# Patient Record
Sex: FEMALE | Race: WHITE | Employment: OTHER | URBAN - METROPOLITAN AREA
[De-identification: names, ages, dates, MRNs, and addresses within clinical notes are randomized per-mention and may not be internally consistent; named-entity substitution may affect disease eponyms.]

---

## 2017-04-06 ENCOUNTER — HOSPITAL ENCOUNTER (OUTPATIENT)
Dept: MAMMOGRAPHY | Facility: HOSPITAL | Age: 67
Discharge: HOME | End: 2017-04-06
Attending: FAMILY MEDICINE

## 2017-04-06 DIAGNOSIS — Z12.31 VISIT FOR SCREENING MAMMOGRAM: ICD-10-CM

## 2017-06-23 ENCOUNTER — RECORDS - HEALTHEAST (OUTPATIENT)
Dept: LAB | Facility: CLINIC | Age: 67
End: 2017-06-23

## 2017-06-23 LAB
CHOLEST SERPL-MCNC: 214 MG/DL
FASTING STATUS PATIENT QL REPORTED: NO
HDLC SERPL-MCNC: 54 MG/DL
LDLC SERPL CALC-MCNC: 123 MG/DL
TRIGL SERPL-MCNC: 185 MG/DL

## 2018-02-08 ENCOUNTER — RECORDS - HEALTHEAST (OUTPATIENT)
Dept: ADMINISTRATIVE | Facility: OTHER | Age: 68
End: 2018-02-08

## 2018-02-13 ENCOUNTER — COMMUNICATION - HEALTHEAST (OUTPATIENT)
Dept: NEUROSURGERY | Facility: CLINIC | Age: 68
End: 2018-02-13

## 2018-02-14 ENCOUNTER — AMBULATORY - HEALTHEAST (OUTPATIENT)
Dept: LAB | Facility: HOSPITAL | Age: 68
End: 2018-02-14

## 2018-02-14 ENCOUNTER — COMMUNICATION - HEALTHEAST (OUTPATIENT)
Dept: TELEHEALTH | Facility: CLINIC | Age: 68
End: 2018-02-14

## 2018-02-14 DIAGNOSIS — K11.7 XEROSTOMIA DUE TO AUTOIMMUNE DISEASE (H): ICD-10-CM

## 2018-02-14 DIAGNOSIS — M35.9 XEROSTOMIA DUE TO AUTOIMMUNE DISEASE (H): ICD-10-CM

## 2018-02-15 LAB — ANA SER QL: 0.4 U

## 2018-02-20 ENCOUNTER — RECORDS - HEALTHEAST (OUTPATIENT)
Dept: ADMINISTRATIVE | Facility: OTHER | Age: 68
End: 2018-02-20

## 2018-02-20 LAB
SS-A/RO AUTOANTIBODIES - HISTORICAL: 10 EU
SS-B/LA AUTOANTIBODIES - HISTORICAL: 0 EU

## 2018-02-22 ENCOUNTER — OFFICE VISIT - HEALTHEAST (OUTPATIENT)
Dept: NEUROSURGERY | Facility: CLINIC | Age: 68
End: 2018-02-22

## 2018-02-22 DIAGNOSIS — M47.812 FACET ARTHROPATHY, CERVICAL: ICD-10-CM

## 2018-02-22 DIAGNOSIS — M54.2 NECK PAIN: ICD-10-CM

## 2018-02-22 RX ORDER — ASPIRIN 325 MG
325 TABLET, DELAYED RELEASE (ENTERIC COATED) ORAL 2 TIMES DAILY
Status: SHIPPED | COMMUNITY
Start: 2018-02-22 | End: 2024-01-09

## 2018-02-22 ASSESSMENT — MIFFLIN-ST. JEOR: SCORE: 1326.89

## 2018-02-26 ENCOUNTER — AMBULATORY - HEALTHEAST (OUTPATIENT)
Dept: LAB | Facility: HOSPITAL | Age: 68
End: 2018-02-26

## 2018-02-26 ENCOUNTER — COMMUNICATION - HEALTHEAST (OUTPATIENT)
Dept: PHYSICAL MEDICINE AND REHAB | Facility: CLINIC | Age: 68
End: 2018-02-26

## 2018-02-26 ENCOUNTER — HOSPITAL ENCOUNTER (OUTPATIENT)
Dept: PHYSICAL MEDICINE AND REHAB | Facility: CLINIC | Age: 68
Discharge: HOME | End: 2018-02-26
Attending: PHYSICIAN ASSISTANT

## 2018-02-26 ENCOUNTER — HOSPITAL ENCOUNTER (OUTPATIENT)
Dept: PHYSICAL MEDICINE AND REHAB | Facility: CLINIC | Age: 68
Discharge: HOME | End: 2018-02-26
Attending: NEUROLOGICAL SURGERY

## 2018-02-26 DIAGNOSIS — M54.81 OCCIPITAL NEURALGIA OF LEFT SIDE: ICD-10-CM

## 2018-02-26 DIAGNOSIS — M54.2 NECK PAIN: ICD-10-CM

## 2018-02-26 DIAGNOSIS — R59.9 LYMPH NODE ENLARGEMENT: ICD-10-CM

## 2018-02-26 DIAGNOSIS — M47.812 FACET ARTHROPATHY, CERVICAL: ICD-10-CM

## 2018-02-26 LAB
BASOPHILS # BLD AUTO: 0 THOU/UL (ref 0–0.2)
BASOPHILS NFR BLD AUTO: 0 % (ref 0–2)
EOSINOPHIL # BLD AUTO: 0.1 THOU/UL (ref 0–0.4)
EOSINOPHIL NFR BLD AUTO: 1 % (ref 0–6)
ERYTHROCYTE [DISTWIDTH] IN BLOOD BY AUTOMATED COUNT: 12.4 % (ref 11–14.5)
HCT VFR BLD AUTO: 42.3 % (ref 35–47)
HGB BLD-MCNC: 14.3 G/DL (ref 12–16)
LYMPHOCYTES # BLD AUTO: 1.8 THOU/UL (ref 0.8–4.4)
LYMPHOCYTES NFR BLD AUTO: 26 % (ref 20–40)
MCH RBC QN AUTO: 29.8 PG (ref 27–34)
MCHC RBC AUTO-ENTMCNC: 33.8 G/DL (ref 32–36)
MCV RBC AUTO: 88 FL (ref 80–100)
MONOCYTES # BLD AUTO: 0.7 THOU/UL (ref 0–0.9)
MONOCYTES NFR BLD AUTO: 10 % (ref 2–10)
NEUTROPHILS # BLD AUTO: 4.2 THOU/UL (ref 2–7.7)
NEUTROPHILS NFR BLD AUTO: 62 % (ref 50–70)
PLATELET # BLD AUTO: 271 THOU/UL (ref 140–440)
PMV BLD AUTO: 9.9 FL (ref 8.5–12.5)
RBC # BLD AUTO: 4.8 MILL/UL (ref 3.8–5.4)
WBC: 6.8 THOU/UL (ref 4–11)

## 2018-02-28 ENCOUNTER — AMBULATORY - HEALTHEAST (OUTPATIENT)
Dept: PHYSICAL MEDICINE AND REHAB | Facility: CLINIC | Age: 68
End: 2018-02-28

## 2018-02-28 DIAGNOSIS — M54.81 OCCIPITAL NEURALGIA OF LEFT SIDE: ICD-10-CM

## 2018-02-28 DIAGNOSIS — M54.2 NECK PAIN: ICD-10-CM

## 2018-03-01 ENCOUNTER — HOSPITAL ENCOUNTER (OUTPATIENT)
Dept: PHYSICAL MEDICINE AND REHAB | Facility: CLINIC | Age: 68
Discharge: HOME | End: 2018-03-01
Attending: PHYSICIAN ASSISTANT

## 2018-03-01 DIAGNOSIS — M54.81 OCCIPITAL NEURALGIA OF LEFT SIDE: ICD-10-CM

## 2018-03-01 DIAGNOSIS — M47.812 FACET ARTHROPATHY, CERVICAL: ICD-10-CM

## 2018-03-01 DIAGNOSIS — M54.2 NECK PAIN: ICD-10-CM

## 2018-03-15 ENCOUNTER — HOSPITAL ENCOUNTER (OUTPATIENT)
Dept: PHYSICAL MEDICINE AND REHAB | Facility: CLINIC | Age: 68
Discharge: HOME | End: 2018-03-15
Attending: PHYSICIAN ASSISTANT

## 2018-03-15 DIAGNOSIS — M47.812 FACET ARTHROPATHY, CERVICAL: ICD-10-CM

## 2018-03-15 DIAGNOSIS — M54.81 OCCIPITAL NEURALGIA OF LEFT SIDE: ICD-10-CM

## 2018-03-15 DIAGNOSIS — M54.2 NECK PAIN: ICD-10-CM

## 2018-03-21 ENCOUNTER — HOSPITAL ENCOUNTER (OUTPATIENT)
Dept: PHYSICAL MEDICINE AND REHAB | Facility: CLINIC | Age: 68
Discharge: HOME | End: 2018-03-21
Attending: PHYSICIAN ASSISTANT

## 2018-03-21 DIAGNOSIS — M47.812 FACET ARTHROPATHY, CERVICAL: ICD-10-CM

## 2018-03-21 DIAGNOSIS — M12.88 OTHER SPECIFIC ARTHROPATHIES, NOT ELSEWHERE CLASSIFIED, OTHER SPECIFIED SITE: ICD-10-CM

## 2018-03-23 ENCOUNTER — COMMUNICATION - HEALTHEAST (OUTPATIENT)
Dept: PHYSICAL MEDICINE AND REHAB | Facility: CLINIC | Age: 68
End: 2018-03-23

## 2018-03-26 ENCOUNTER — HOSPITAL ENCOUNTER (OUTPATIENT)
Dept: PHYSICAL MEDICINE AND REHAB | Facility: CLINIC | Age: 68
Discharge: HOME | End: 2018-03-26
Attending: PHYSICIAN ASSISTANT

## 2018-03-26 DIAGNOSIS — M47.812 FACET ARTHROPATHY, CERVICAL: ICD-10-CM

## 2018-03-26 DIAGNOSIS — M54.12 CERVICAL RADICULITIS: ICD-10-CM

## 2018-03-26 DIAGNOSIS — M48.02 FORAMINAL STENOSIS OF CERVICAL REGION: ICD-10-CM

## 2018-03-27 ENCOUNTER — COMMUNICATION - HEALTHEAST (OUTPATIENT)
Dept: PHYSICAL MEDICINE AND REHAB | Facility: CLINIC | Age: 68
End: 2018-03-27

## 2018-03-29 ENCOUNTER — HOSPITAL ENCOUNTER (OUTPATIENT)
Dept: PHYSICAL MEDICINE AND REHAB | Facility: CLINIC | Age: 68
Discharge: HOME | End: 2018-03-29
Attending: PHYSICIAN ASSISTANT

## 2018-03-29 DIAGNOSIS — M99.81 OTHER BIOMECHANICAL LESIONS OF CERVICAL REGION: ICD-10-CM

## 2018-03-29 DIAGNOSIS — M48.02 FORAMINAL STENOSIS OF CERVICAL REGION: ICD-10-CM

## 2018-04-09 ENCOUNTER — HOSPITAL ENCOUNTER (OUTPATIENT)
Dept: MAMMOGRAPHY | Facility: CLINIC | Age: 68
Discharge: HOME | End: 2018-04-09
Attending: FAMILY MEDICINE

## 2018-04-09 DIAGNOSIS — Z12.31 VISIT FOR SCREENING MAMMOGRAM: ICD-10-CM

## 2018-04-12 ENCOUNTER — HOSPITAL ENCOUNTER (OUTPATIENT)
Dept: PHYSICAL MEDICINE AND REHAB | Facility: CLINIC | Age: 68
Discharge: HOME | End: 2018-04-12
Attending: PHYSICIAN ASSISTANT

## 2018-04-12 DIAGNOSIS — F41.9 ANXIETY: ICD-10-CM

## 2018-04-12 DIAGNOSIS — M48.02 FORAMINAL STENOSIS OF CERVICAL REGION: ICD-10-CM

## 2018-04-12 DIAGNOSIS — M47.812 FACET ARTHROPATHY, CERVICAL: ICD-10-CM

## 2018-04-12 RX ORDER — SODIUM FLUORIDE 6.1 MG/ML
GEL, DENTIFRICE DENTAL
Refills: 6 | Status: SHIPPED | COMMUNITY
Start: 2018-03-27

## 2018-04-19 ENCOUNTER — HOSPITAL ENCOUNTER (OUTPATIENT)
Dept: PHYSICAL MEDICINE AND REHAB | Facility: CLINIC | Age: 68
Discharge: HOME | End: 2018-04-19
Attending: PHYSICIAN ASSISTANT

## 2018-04-19 DIAGNOSIS — M47.812 FACET ARTHROPATHY, CERVICAL: ICD-10-CM

## 2018-04-19 DIAGNOSIS — M48.02 FORAMINAL STENOSIS OF CERVICAL REGION: ICD-10-CM

## 2018-04-19 DIAGNOSIS — M99.81 OTHER BIOMECHANICAL LESIONS OF CERVICAL REGION: ICD-10-CM

## 2018-04-19 DIAGNOSIS — M54.81 OCCIPITAL NEURALGIA OF LEFT SIDE: ICD-10-CM

## 2018-04-20 ENCOUNTER — COMMUNICATION - HEALTHEAST (OUTPATIENT)
Dept: PHYSICAL MEDICINE AND REHAB | Facility: CLINIC | Age: 68
End: 2018-04-20

## 2018-04-24 ENCOUNTER — HOSPITAL ENCOUNTER (OUTPATIENT)
Dept: PHYSICAL MEDICINE AND REHAB | Facility: CLINIC | Age: 68
Discharge: HOME | End: 2018-04-24
Attending: PHYSICAL MEDICINE & REHABILITATION

## 2018-04-24 DIAGNOSIS — M99.03 LUMBAR REGION SOMATIC DYSFUNCTION: ICD-10-CM

## 2018-04-24 DIAGNOSIS — M99.05 SOMATIC DYSFUNCTION OF PELVIS REGION: ICD-10-CM

## 2018-04-24 DIAGNOSIS — M99.04 SACRAL REGION SOMATIC DYSFUNCTION: ICD-10-CM

## 2018-04-24 DIAGNOSIS — M54.2 NECK PAIN: ICD-10-CM

## 2018-04-24 DIAGNOSIS — M99.01 CERVICAL SOMATIC DYSFUNCTION: ICD-10-CM

## 2018-04-24 DIAGNOSIS — M99.02 THORACIC REGION SOMATIC DYSFUNCTION: ICD-10-CM

## 2018-04-24 DIAGNOSIS — M99.08 RIB CAGE REGION SOMATIC DYSFUNCTION: ICD-10-CM

## 2018-04-24 DIAGNOSIS — M99.00 HEAD REGION SOMATIC DYSFUNCTION: ICD-10-CM

## 2018-05-02 ENCOUNTER — HOSPITAL ENCOUNTER (OUTPATIENT)
Dept: PHYSICAL MEDICINE AND REHAB | Facility: CLINIC | Age: 68
Discharge: HOME | End: 2018-05-02
Attending: PHYSICAL MEDICINE & REHABILITATION

## 2018-05-02 DIAGNOSIS — M47.812 CERVICAL FACET SYNDROME: ICD-10-CM

## 2018-05-02 DIAGNOSIS — M99.02 THORACIC REGION SOMATIC DYSFUNCTION: ICD-10-CM

## 2018-05-02 DIAGNOSIS — M99.04 SACRAL REGION SOMATIC DYSFUNCTION: ICD-10-CM

## 2018-05-02 DIAGNOSIS — M99.03 LUMBAR REGION SOMATIC DYSFUNCTION: ICD-10-CM

## 2018-05-02 DIAGNOSIS — M99.08 RIB CAGE REGION SOMATIC DYSFUNCTION: ICD-10-CM

## 2018-05-02 DIAGNOSIS — M99.01 CERVICAL SOMATIC DYSFUNCTION: ICD-10-CM

## 2018-05-02 DIAGNOSIS — M54.2 NECK PAIN: ICD-10-CM

## 2018-05-02 DIAGNOSIS — M99.05 SOMATIC DYSFUNCTION OF PELVIS REGION: ICD-10-CM

## 2018-05-02 DIAGNOSIS — M99.00 HEAD REGION SOMATIC DYSFUNCTION: ICD-10-CM

## 2018-05-09 ENCOUNTER — HOSPITAL ENCOUNTER (OUTPATIENT)
Dept: PHYSICAL MEDICINE AND REHAB | Facility: CLINIC | Age: 68
Discharge: HOME | End: 2018-05-09
Attending: PHYSICAL MEDICINE & REHABILITATION

## 2018-05-09 DIAGNOSIS — M54.2 NECK PAIN: ICD-10-CM

## 2018-05-09 DIAGNOSIS — M99.04 SACRAL REGION SOMATIC DYSFUNCTION: ICD-10-CM

## 2018-05-09 DIAGNOSIS — M99.00 HEAD REGION SOMATIC DYSFUNCTION: ICD-10-CM

## 2018-05-09 DIAGNOSIS — M99.05 SOMATIC DYSFUNCTION OF PELVIS REGION: ICD-10-CM

## 2018-05-09 DIAGNOSIS — M47.812 CERVICAL FACET SYNDROME: ICD-10-CM

## 2018-05-09 DIAGNOSIS — M99.02 THORACIC REGION SOMATIC DYSFUNCTION: ICD-10-CM

## 2018-05-09 DIAGNOSIS — M99.03 LUMBAR REGION SOMATIC DYSFUNCTION: ICD-10-CM

## 2018-05-09 DIAGNOSIS — M99.01 CERVICAL SOMATIC DYSFUNCTION: ICD-10-CM

## 2018-05-09 DIAGNOSIS — M99.08 RIB CAGE REGION SOMATIC DYSFUNCTION: ICD-10-CM

## 2018-05-11 ENCOUNTER — OFFICE VISIT - HEALTHEAST (OUTPATIENT)
Dept: PHYSICAL THERAPY | Facility: REHABILITATION | Age: 68
End: 2018-05-11

## 2018-05-11 DIAGNOSIS — M47.812 CERVICAL FACET SYNDROME: ICD-10-CM

## 2018-05-11 DIAGNOSIS — M54.2 NECK PAIN: ICD-10-CM

## 2018-05-11 DIAGNOSIS — M54.2 CERVICALGIA: ICD-10-CM

## 2018-05-18 ENCOUNTER — OFFICE VISIT - HEALTHEAST (OUTPATIENT)
Dept: PHYSICAL THERAPY | Facility: REHABILITATION | Age: 68
End: 2018-05-18

## 2018-05-18 DIAGNOSIS — M54.2 CERVICALGIA: ICD-10-CM

## 2018-05-18 DIAGNOSIS — M54.2 NECK PAIN: ICD-10-CM

## 2018-05-18 DIAGNOSIS — M47.812 CERVICAL FACET SYNDROME: ICD-10-CM

## 2018-05-23 ENCOUNTER — HOSPITAL ENCOUNTER (OUTPATIENT)
Dept: PHYSICAL MEDICINE AND REHAB | Facility: CLINIC | Age: 68
Discharge: HOME | End: 2018-05-23
Attending: PHYSICAL MEDICINE & REHABILITATION

## 2018-05-23 DIAGNOSIS — M54.2 NECK PAIN: ICD-10-CM

## 2018-05-23 DIAGNOSIS — M99.08 RIB CAGE REGION SOMATIC DYSFUNCTION: ICD-10-CM

## 2018-05-23 DIAGNOSIS — M99.04 SACRAL REGION SOMATIC DYSFUNCTION: ICD-10-CM

## 2018-05-23 DIAGNOSIS — M99.03 LUMBAR REGION SOMATIC DYSFUNCTION: ICD-10-CM

## 2018-05-23 DIAGNOSIS — M99.00 HEAD REGION SOMATIC DYSFUNCTION: ICD-10-CM

## 2018-05-23 DIAGNOSIS — M99.01 CERVICAL SOMATIC DYSFUNCTION: ICD-10-CM

## 2018-05-23 DIAGNOSIS — M99.05 SOMATIC DYSFUNCTION OF PELVIS REGION: ICD-10-CM

## 2018-05-23 DIAGNOSIS — M99.02 THORACIC REGION SOMATIC DYSFUNCTION: ICD-10-CM

## 2018-06-08 ENCOUNTER — COMMUNICATION - HEALTHEAST (OUTPATIENT)
Dept: PHYSICAL THERAPY | Facility: REHABILITATION | Age: 68
End: 2018-06-08

## 2018-06-08 ENCOUNTER — HOSPITAL ENCOUNTER (OUTPATIENT)
Dept: PHYSICAL MEDICINE AND REHAB | Facility: CLINIC | Age: 68
Discharge: HOME | End: 2018-06-08
Attending: PHYSICAL MEDICINE & REHABILITATION

## 2018-06-08 DIAGNOSIS — M99.03 LUMBAR REGION SOMATIC DYSFUNCTION: ICD-10-CM

## 2018-06-08 DIAGNOSIS — M99.01 CERVICAL SOMATIC DYSFUNCTION: ICD-10-CM

## 2018-06-08 DIAGNOSIS — M99.04 SACRAL REGION SOMATIC DYSFUNCTION: ICD-10-CM

## 2018-06-08 DIAGNOSIS — M54.2 NECK PAIN: ICD-10-CM

## 2018-06-08 DIAGNOSIS — M99.05 SOMATIC DYSFUNCTION OF PELVIS REGION: ICD-10-CM

## 2018-06-08 DIAGNOSIS — M99.00 HEAD REGION SOMATIC DYSFUNCTION: ICD-10-CM

## 2018-06-08 DIAGNOSIS — M99.02 THORACIC REGION SOMATIC DYSFUNCTION: ICD-10-CM

## 2018-06-08 DIAGNOSIS — M99.08 RIB CAGE REGION SOMATIC DYSFUNCTION: ICD-10-CM

## 2018-06-25 ENCOUNTER — RECORDS - HEALTHEAST (OUTPATIENT)
Dept: LAB | Facility: CLINIC | Age: 68
End: 2018-06-25

## 2018-06-25 LAB
CHOLEST SERPL-MCNC: 194 MG/DL
FASTING STATUS PATIENT QL REPORTED: NO
FASTING STATUS PATIENT QL REPORTED: NO
GLUCOSE BLD-MCNC: 88 MG/DL (ref 70–125)
HDLC SERPL-MCNC: 47 MG/DL
LDLC SERPL CALC-MCNC: 125 MG/DL
TRIGL SERPL-MCNC: 111 MG/DL

## 2018-06-29 ENCOUNTER — HOSPITAL ENCOUNTER (OUTPATIENT)
Dept: PHYSICAL MEDICINE AND REHAB | Facility: CLINIC | Age: 68
Discharge: HOME | End: 2018-06-29
Attending: PHYSICAL MEDICINE & REHABILITATION

## 2018-06-29 DIAGNOSIS — M54.2 NECK PAIN: ICD-10-CM

## 2018-06-29 DIAGNOSIS — M25.551 HIP PAIN, RIGHT: ICD-10-CM

## 2018-06-29 DIAGNOSIS — M99.05 SOMATIC DYSFUNCTION OF PELVIS REGION: ICD-10-CM

## 2018-06-29 DIAGNOSIS — M25.552 HIP PAIN, LEFT: ICD-10-CM

## 2018-06-29 DIAGNOSIS — M99.00 HEAD REGION SOMATIC DYSFUNCTION: ICD-10-CM

## 2018-06-29 DIAGNOSIS — M99.03 LUMBAR REGION SOMATIC DYSFUNCTION: ICD-10-CM

## 2018-06-29 DIAGNOSIS — M99.04 SACRAL REGION SOMATIC DYSFUNCTION: ICD-10-CM

## 2018-06-29 DIAGNOSIS — M99.02 THORACIC REGION SOMATIC DYSFUNCTION: ICD-10-CM

## 2018-06-29 DIAGNOSIS — M99.01 CERVICAL SOMATIC DYSFUNCTION: ICD-10-CM

## 2018-06-29 DIAGNOSIS — M99.08 RIB CAGE REGION SOMATIC DYSFUNCTION: ICD-10-CM

## 2018-07-02 ENCOUNTER — COMMUNICATION - HEALTHEAST (OUTPATIENT)
Dept: TELEHEALTH | Facility: CLINIC | Age: 68
End: 2018-07-02

## 2018-07-02 ENCOUNTER — HOSPITAL ENCOUNTER (OUTPATIENT)
Dept: RADIOLOGY | Facility: HOSPITAL | Age: 68
Discharge: HOME | End: 2018-07-02
Attending: PHYSICAL MEDICINE & REHABILITATION

## 2018-07-02 DIAGNOSIS — M25.551 HIP PAIN, RIGHT: ICD-10-CM

## 2018-07-02 DIAGNOSIS — M25.552 HIP PAIN, LEFT: ICD-10-CM

## 2018-08-06 ENCOUNTER — HOSPITAL ENCOUNTER (OUTPATIENT)
Dept: PHYSICAL MEDICINE AND REHAB | Facility: CLINIC | Age: 68
Discharge: HOME | End: 2018-08-06
Attending: PHYSICAL MEDICINE & REHABILITATION

## 2018-08-06 DIAGNOSIS — M54.50 LEFT-SIDED LOW BACK PAIN WITHOUT SCIATICA: ICD-10-CM

## 2018-08-06 DIAGNOSIS — M99.01 CERVICAL SOMATIC DYSFUNCTION: ICD-10-CM

## 2018-08-06 DIAGNOSIS — M99.08 RIB CAGE REGION SOMATIC DYSFUNCTION: ICD-10-CM

## 2018-08-06 DIAGNOSIS — M25.562 LEFT KNEE PAIN: ICD-10-CM

## 2018-08-06 DIAGNOSIS — M54.2 NECK PAIN: ICD-10-CM

## 2018-08-06 DIAGNOSIS — M99.05 SOMATIC DYSFUNCTION OF PELVIS REGION: ICD-10-CM

## 2018-08-06 DIAGNOSIS — M99.00 HEAD REGION SOMATIC DYSFUNCTION: ICD-10-CM

## 2018-08-06 DIAGNOSIS — M16.12 PRIMARY OSTEOARTHRITIS OF LEFT HIP: ICD-10-CM

## 2018-08-06 DIAGNOSIS — M25.552 HIP PAIN, LEFT: ICD-10-CM

## 2018-08-06 DIAGNOSIS — M99.04 SACRAL REGION SOMATIC DYSFUNCTION: ICD-10-CM

## 2018-08-06 DIAGNOSIS — M99.03 LUMBAR REGION SOMATIC DYSFUNCTION: ICD-10-CM

## 2018-08-06 DIAGNOSIS — M76.32 IT BAND SYNDROME, LEFT: ICD-10-CM

## 2018-08-06 DIAGNOSIS — M99.02 THORACIC REGION SOMATIC DYSFUNCTION: ICD-10-CM

## 2018-08-17 ENCOUNTER — OFFICE VISIT - HEALTHEAST (OUTPATIENT)
Dept: PHYSICAL THERAPY | Facility: REHABILITATION | Age: 68
End: 2018-08-17

## 2018-08-17 DIAGNOSIS — M62.81 GENERALIZED MUSCLE WEAKNESS: ICD-10-CM

## 2018-08-17 DIAGNOSIS — G89.29 CHRONIC MIDLINE LOW BACK PAIN WITHOUT SCIATICA: ICD-10-CM

## 2018-08-17 DIAGNOSIS — G89.29 CHRONIC HIP PAIN, LEFT: ICD-10-CM

## 2018-08-17 DIAGNOSIS — M54.50 CHRONIC MIDLINE LOW BACK PAIN WITHOUT SCIATICA: ICD-10-CM

## 2018-08-17 DIAGNOSIS — M25.552 CHRONIC HIP PAIN, LEFT: ICD-10-CM

## 2018-10-17 ENCOUNTER — HOSPITAL ENCOUNTER (OUTPATIENT)
Dept: PHYSICAL MEDICINE AND REHAB | Facility: CLINIC | Age: 68
Discharge: HOME | End: 2018-10-17
Attending: PHYSICAL MEDICINE & REHABILITATION

## 2018-10-17 DIAGNOSIS — M79.18 LEFT BUTTOCK PAIN: ICD-10-CM

## 2018-10-17 DIAGNOSIS — M99.05 SOMATIC DYSFUNCTION OF PELVIS REGION: ICD-10-CM

## 2018-10-17 DIAGNOSIS — M54.2 NECK PAIN: ICD-10-CM

## 2018-10-17 DIAGNOSIS — M99.03 LUMBAR REGION SOMATIC DYSFUNCTION: ICD-10-CM

## 2018-10-17 DIAGNOSIS — M99.04 SACRAL REGION SOMATIC DYSFUNCTION: ICD-10-CM

## 2018-10-17 DIAGNOSIS — M99.02 THORACIC REGION SOMATIC DYSFUNCTION: ICD-10-CM

## 2018-10-17 DIAGNOSIS — M99.00 HEAD REGION SOMATIC DYSFUNCTION: ICD-10-CM

## 2018-10-17 DIAGNOSIS — M99.01 CERVICAL SOMATIC DYSFUNCTION: ICD-10-CM

## 2018-10-17 DIAGNOSIS — M99.08 RIB CAGE REGION SOMATIC DYSFUNCTION: ICD-10-CM

## 2018-10-31 ENCOUNTER — HOSPITAL ENCOUNTER (OUTPATIENT)
Dept: PHYSICAL MEDICINE AND REHAB | Facility: CLINIC | Age: 68
Discharge: HOME | End: 2018-10-31
Attending: PHYSICAL MEDICINE & REHABILITATION

## 2018-10-31 DIAGNOSIS — M99.03 LUMBAR REGION SOMATIC DYSFUNCTION: ICD-10-CM

## 2018-10-31 DIAGNOSIS — M99.02 THORACIC REGION SOMATIC DYSFUNCTION: ICD-10-CM

## 2018-10-31 DIAGNOSIS — M99.08 RIB CAGE REGION SOMATIC DYSFUNCTION: ICD-10-CM

## 2018-10-31 DIAGNOSIS — M99.01 CERVICAL SOMATIC DYSFUNCTION: ICD-10-CM

## 2018-10-31 DIAGNOSIS — M99.05 SOMATIC DYSFUNCTION OF PELVIS REGION: ICD-10-CM

## 2018-10-31 DIAGNOSIS — M47.812 CERVICAL FACET SYNDROME: ICD-10-CM

## 2018-10-31 DIAGNOSIS — M25.561 KNEE PAIN, BILATERAL: ICD-10-CM

## 2018-10-31 DIAGNOSIS — M25.562 KNEE PAIN, BILATERAL: ICD-10-CM

## 2018-10-31 DIAGNOSIS — M79.18 LEFT BUTTOCK PAIN: ICD-10-CM

## 2018-10-31 DIAGNOSIS — M99.00 HEAD REGION SOMATIC DYSFUNCTION: ICD-10-CM

## 2018-10-31 DIAGNOSIS — M99.04 SACRAL REGION SOMATIC DYSFUNCTION: ICD-10-CM

## 2018-10-31 DIAGNOSIS — M54.2 NECK PAIN: ICD-10-CM

## 2018-11-19 ENCOUNTER — HOSPITAL ENCOUNTER (OUTPATIENT)
Dept: PHYSICAL MEDICINE AND REHAB | Facility: CLINIC | Age: 68
Discharge: HOME | End: 2018-11-19
Attending: PHYSICAL MEDICINE & REHABILITATION

## 2018-11-19 DIAGNOSIS — M99.01 CERVICAL SOMATIC DYSFUNCTION: ICD-10-CM

## 2018-11-19 DIAGNOSIS — M99.05 SOMATIC DYSFUNCTION OF PELVIS REGION: ICD-10-CM

## 2018-11-19 DIAGNOSIS — M99.04 SACRAL REGION SOMATIC DYSFUNCTION: ICD-10-CM

## 2018-11-19 DIAGNOSIS — M25.562 PAIN IN BOTH KNEES, UNSPECIFIED CHRONICITY: ICD-10-CM

## 2018-11-19 DIAGNOSIS — M47.812 CERVICAL FACET SYNDROME: ICD-10-CM

## 2018-11-19 DIAGNOSIS — M25.561 PAIN IN BOTH KNEES, UNSPECIFIED CHRONICITY: ICD-10-CM

## 2018-11-19 DIAGNOSIS — M99.02 THORACIC REGION SOMATIC DYSFUNCTION: ICD-10-CM

## 2018-11-19 DIAGNOSIS — M79.18 LEFT BUTTOCK PAIN: ICD-10-CM

## 2018-11-19 DIAGNOSIS — M99.00 HEAD REGION SOMATIC DYSFUNCTION: ICD-10-CM

## 2018-11-19 DIAGNOSIS — M99.03 LUMBAR REGION SOMATIC DYSFUNCTION: ICD-10-CM

## 2018-11-19 DIAGNOSIS — M99.08 RIB CAGE REGION SOMATIC DYSFUNCTION: ICD-10-CM

## 2018-11-19 DIAGNOSIS — M54.2 NECK PAIN: ICD-10-CM

## 2018-12-04 ENCOUNTER — HOSPITAL ENCOUNTER (OUTPATIENT)
Dept: PHYSICAL MEDICINE AND REHAB | Facility: CLINIC | Age: 68
Discharge: HOME | End: 2018-12-04
Attending: PHYSICAL MEDICINE & REHABILITATION

## 2018-12-04 DIAGNOSIS — M25.561 PAIN IN BOTH KNEES, UNSPECIFIED CHRONICITY: ICD-10-CM

## 2018-12-04 DIAGNOSIS — M99.05 SOMATIC DYSFUNCTION OF PELVIS REGION: ICD-10-CM

## 2018-12-04 DIAGNOSIS — M99.04 SACRAL REGION SOMATIC DYSFUNCTION: ICD-10-CM

## 2018-12-04 DIAGNOSIS — M79.18 LEFT BUTTOCK PAIN: ICD-10-CM

## 2018-12-04 DIAGNOSIS — M99.08 RIB CAGE REGION SOMATIC DYSFUNCTION: ICD-10-CM

## 2018-12-04 DIAGNOSIS — M99.00 HEAD REGION SOMATIC DYSFUNCTION: ICD-10-CM

## 2018-12-04 DIAGNOSIS — M25.562 PAIN IN BOTH KNEES, UNSPECIFIED CHRONICITY: ICD-10-CM

## 2018-12-04 DIAGNOSIS — M99.03 LUMBAR REGION SOMATIC DYSFUNCTION: ICD-10-CM

## 2018-12-04 DIAGNOSIS — M54.2 NECK PAIN: ICD-10-CM

## 2018-12-04 DIAGNOSIS — M99.01 CERVICAL SOMATIC DYSFUNCTION: ICD-10-CM

## 2018-12-04 DIAGNOSIS — M99.02 THORACIC REGION SOMATIC DYSFUNCTION: ICD-10-CM

## 2018-12-04 ASSESSMENT — MIFFLIN-ST. JEOR: SCORE: 1326.89

## 2019-01-07 ENCOUNTER — HOSPITAL ENCOUNTER (OUTPATIENT)
Dept: PHYSICAL MEDICINE AND REHAB | Facility: CLINIC | Age: 69
Discharge: HOME | End: 2019-01-07
Attending: PHYSICAL MEDICINE & REHABILITATION

## 2019-01-07 DIAGNOSIS — M99.05 SOMATIC DYSFUNCTION OF PELVIS REGION: ICD-10-CM

## 2019-01-07 DIAGNOSIS — M54.2 NECK PAIN: ICD-10-CM

## 2019-01-07 DIAGNOSIS — M99.00 HEAD REGION SOMATIC DYSFUNCTION: ICD-10-CM

## 2019-01-07 DIAGNOSIS — G89.29 CHRONIC MIDLINE LOW BACK PAIN WITHOUT SCIATICA: ICD-10-CM

## 2019-01-07 DIAGNOSIS — M54.50 CHRONIC MIDLINE LOW BACK PAIN WITHOUT SCIATICA: ICD-10-CM

## 2019-01-07 DIAGNOSIS — M99.08 RIB CAGE REGION SOMATIC DYSFUNCTION: ICD-10-CM

## 2019-01-07 DIAGNOSIS — M99.01 CERVICAL SOMATIC DYSFUNCTION: ICD-10-CM

## 2019-01-07 DIAGNOSIS — M99.02 THORACIC REGION SOMATIC DYSFUNCTION: ICD-10-CM

## 2019-01-07 DIAGNOSIS — M99.03 LUMBAR REGION SOMATIC DYSFUNCTION: ICD-10-CM

## 2019-01-07 DIAGNOSIS — M99.04 SACRAL REGION SOMATIC DYSFUNCTION: ICD-10-CM

## 2019-01-29 ENCOUNTER — COMMUNICATION - HEALTHEAST (OUTPATIENT)
Dept: PHYSICAL MEDICINE AND REHAB | Facility: CLINIC | Age: 69
End: 2019-01-29

## 2019-01-29 DIAGNOSIS — M25.562 PAIN IN BOTH KNEES, UNSPECIFIED CHRONICITY: ICD-10-CM

## 2019-01-29 DIAGNOSIS — M25.561 PAIN IN BOTH KNEES, UNSPECIFIED CHRONICITY: ICD-10-CM

## 2019-01-29 DIAGNOSIS — M79.18 LEFT BUTTOCK PAIN: ICD-10-CM

## 2019-02-12 ENCOUNTER — COMMUNICATION - HEALTHEAST (OUTPATIENT)
Dept: PHYSICAL MEDICINE AND REHAB | Facility: CLINIC | Age: 69
End: 2019-02-12

## 2019-02-12 DIAGNOSIS — M25.561 PAIN IN BOTH KNEES, UNSPECIFIED CHRONICITY: ICD-10-CM

## 2019-02-12 DIAGNOSIS — M79.18 LEFT BUTTOCK PAIN: ICD-10-CM

## 2019-02-12 DIAGNOSIS — M25.562 PAIN IN BOTH KNEES, UNSPECIFIED CHRONICITY: ICD-10-CM

## 2019-03-04 ENCOUNTER — HOSPITAL ENCOUNTER (OUTPATIENT)
Dept: PHYSICAL MEDICINE AND REHAB | Facility: CLINIC | Age: 69
Discharge: HOME | End: 2019-03-04
Attending: PHYSICAL MEDICINE & REHABILITATION

## 2019-03-04 DIAGNOSIS — G89.29 CHRONIC MIDLINE LOW BACK PAIN WITHOUT SCIATICA: ICD-10-CM

## 2019-03-04 DIAGNOSIS — M99.01 CERVICAL SOMATIC DYSFUNCTION: ICD-10-CM

## 2019-03-04 DIAGNOSIS — M54.50 CHRONIC MIDLINE LOW BACK PAIN WITHOUT SCIATICA: ICD-10-CM

## 2019-03-04 DIAGNOSIS — M99.00 HEAD REGION SOMATIC DYSFUNCTION: ICD-10-CM

## 2019-03-04 DIAGNOSIS — M79.18 LEFT BUTTOCK PAIN: ICD-10-CM

## 2019-03-04 DIAGNOSIS — M99.04 SACRAL REGION SOMATIC DYSFUNCTION: ICD-10-CM

## 2019-03-04 DIAGNOSIS — M99.02 THORACIC REGION SOMATIC DYSFUNCTION: ICD-10-CM

## 2019-03-04 DIAGNOSIS — M16.12 PRIMARY OSTEOARTHRITIS OF LEFT HIP: ICD-10-CM

## 2019-03-04 DIAGNOSIS — M25.552 HIP PAIN, LEFT: ICD-10-CM

## 2019-03-04 DIAGNOSIS — M99.05 SOMATIC DYSFUNCTION OF PELVIS REGION: ICD-10-CM

## 2019-03-04 DIAGNOSIS — M99.03 LUMBAR REGION SOMATIC DYSFUNCTION: ICD-10-CM

## 2019-03-04 DIAGNOSIS — M99.08 RIB CAGE REGION SOMATIC DYSFUNCTION: ICD-10-CM

## 2019-03-27 ENCOUNTER — HOSPITAL ENCOUNTER (OUTPATIENT)
Dept: PHYSICAL MEDICINE AND REHAB | Facility: CLINIC | Age: 69
Discharge: HOME | End: 2019-03-27
Attending: PHYSICAL MEDICINE & REHABILITATION

## 2019-03-27 DIAGNOSIS — M16.12 PRIMARY OSTEOARTHRITIS OF LEFT HIP: ICD-10-CM

## 2019-03-27 DIAGNOSIS — M25.552 HIP PAIN, LEFT: ICD-10-CM

## 2019-04-02 ENCOUNTER — COMMUNICATION - HEALTHEAST (OUTPATIENT)
Dept: PHYSICAL MEDICINE AND REHAB | Facility: CLINIC | Age: 69
End: 2019-04-02

## 2019-04-02 DIAGNOSIS — M16.12 PRIMARY OSTEOARTHRITIS OF LEFT HIP: ICD-10-CM

## 2019-04-16 ENCOUNTER — HOSPITAL ENCOUNTER (OUTPATIENT)
Dept: PHYSICAL MEDICINE AND REHAB | Facility: CLINIC | Age: 69
Discharge: HOME | End: 2019-04-16
Attending: PAIN MEDICINE

## 2019-04-16 DIAGNOSIS — M16.12 PRIMARY OSTEOARTHRITIS OF LEFT HIP: ICD-10-CM

## 2019-05-07 ENCOUNTER — COMMUNICATION - HEALTHEAST (OUTPATIENT)
Dept: PHYSICAL MEDICINE AND REHAB | Facility: CLINIC | Age: 69
End: 2019-05-07

## 2019-05-07 ENCOUNTER — HOSPITAL ENCOUNTER (OUTPATIENT)
Dept: PHYSICAL MEDICINE AND REHAB | Facility: CLINIC | Age: 69
Discharge: HOME | End: 2019-05-07
Attending: PHYSICAL MEDICINE & REHABILITATION

## 2019-05-07 DIAGNOSIS — M17.12 PRIMARY OSTEOARTHRITIS OF LEFT KNEE: ICD-10-CM

## 2019-05-07 DIAGNOSIS — M79.18 LEFT BUTTOCK PAIN: ICD-10-CM

## 2019-05-07 DIAGNOSIS — M19.041 OSTEOARTHRITIS OF BOTH HANDS, UNSPECIFIED OSTEOARTHRITIS TYPE: ICD-10-CM

## 2019-05-07 DIAGNOSIS — M19.042 OSTEOARTHRITIS OF BOTH HANDS, UNSPECIFIED OSTEOARTHRITIS TYPE: ICD-10-CM

## 2019-05-07 DIAGNOSIS — G89.29 CHRONIC PAIN OF RIGHT KNEE: ICD-10-CM

## 2019-05-07 DIAGNOSIS — M25.562 PAIN IN BOTH KNEES, UNSPECIFIED CHRONICITY: ICD-10-CM

## 2019-05-07 DIAGNOSIS — M16.12 PRIMARY OSTEOARTHRITIS OF LEFT HIP: ICD-10-CM

## 2019-05-07 DIAGNOSIS — M79.602 PAIN IN BOTH UPPER EXTREMITIES: ICD-10-CM

## 2019-05-07 DIAGNOSIS — G89.29 CHRONIC PAIN OF LEFT KNEE: ICD-10-CM

## 2019-05-07 DIAGNOSIS — M25.561 PAIN IN BOTH KNEES, UNSPECIFIED CHRONICITY: ICD-10-CM

## 2019-05-07 DIAGNOSIS — M25.561 CHRONIC PAIN OF RIGHT KNEE: ICD-10-CM

## 2019-05-07 DIAGNOSIS — M53.3 PAIN OF LEFT SACROILIAC JOINT: ICD-10-CM

## 2019-05-07 DIAGNOSIS — M25.562 CHRONIC PAIN OF LEFT KNEE: ICD-10-CM

## 2019-05-07 DIAGNOSIS — M79.601 PAIN IN BOTH UPPER EXTREMITIES: ICD-10-CM

## 2019-05-07 DIAGNOSIS — M25.552 HIP PAIN, LEFT: ICD-10-CM

## 2019-05-07 DIAGNOSIS — M54.50 CHRONIC LEFT-SIDED LOW BACK PAIN WITHOUT SCIATICA: ICD-10-CM

## 2019-05-07 DIAGNOSIS — G89.29 CHRONIC LEFT-SIDED LOW BACK PAIN WITHOUT SCIATICA: ICD-10-CM

## 2019-06-04 ENCOUNTER — HOSPITAL ENCOUNTER (OUTPATIENT)
Dept: PHYSICAL MEDICINE AND REHAB | Facility: CLINIC | Age: 69
Discharge: HOME | End: 2019-06-04
Attending: PHYSICAL MEDICINE & REHABILITATION

## 2019-06-04 DIAGNOSIS — G89.29 CHRONIC LEFT-SIDED LOW BACK PAIN WITHOUT SCIATICA: ICD-10-CM

## 2019-06-04 DIAGNOSIS — M54.50 CHRONIC LEFT-SIDED LOW BACK PAIN WITHOUT SCIATICA: ICD-10-CM

## 2019-06-04 DIAGNOSIS — M53.3 PAIN OF LEFT SACROILIAC JOINT: ICD-10-CM

## 2019-06-05 ENCOUNTER — OFFICE VISIT - HEALTHEAST (OUTPATIENT)
Dept: OCCUPATIONAL THERAPY | Facility: REHABILITATION | Age: 69
End: 2019-06-05

## 2019-06-05 ENCOUNTER — OFFICE VISIT - HEALTHEAST (OUTPATIENT)
Dept: PHYSICAL THERAPY | Facility: REHABILITATION | Age: 69
End: 2019-06-05

## 2019-06-05 DIAGNOSIS — G89.29 CHRONIC MIDLINE LOW BACK PAIN WITHOUT SCIATICA: ICD-10-CM

## 2019-06-05 DIAGNOSIS — G89.29 CHRONIC HIP PAIN, LEFT: ICD-10-CM

## 2019-06-05 DIAGNOSIS — Z78.9 IMPAIRED INSTRUMENTAL ACTIVITIES OF DAILY LIVING: ICD-10-CM

## 2019-06-05 DIAGNOSIS — M62.81 GENERALIZED MUSCLE WEAKNESS: ICD-10-CM

## 2019-06-05 DIAGNOSIS — M79.645 BILATERAL THUMB PAIN: ICD-10-CM

## 2019-06-05 DIAGNOSIS — M54.50 CHRONIC MIDLINE LOW BACK PAIN WITHOUT SCIATICA: ICD-10-CM

## 2019-06-05 DIAGNOSIS — M25.552 CHRONIC HIP PAIN, LEFT: ICD-10-CM

## 2019-06-05 DIAGNOSIS — M79.644 BILATERAL THUMB PAIN: ICD-10-CM

## 2019-06-05 DIAGNOSIS — R29.898 WEAKNESS OF BOTH HANDS: ICD-10-CM

## 2019-06-05 DIAGNOSIS — Z78.9 DECREASED ACTIVITIES OF DAILY LIVING (ADL): ICD-10-CM

## 2019-06-12 ENCOUNTER — OFFICE VISIT - HEALTHEAST (OUTPATIENT)
Dept: PHYSICAL THERAPY | Facility: REHABILITATION | Age: 69
End: 2019-06-12

## 2019-06-12 ENCOUNTER — OFFICE VISIT - HEALTHEAST (OUTPATIENT)
Dept: OCCUPATIONAL THERAPY | Facility: REHABILITATION | Age: 69
End: 2019-06-12

## 2019-06-12 DIAGNOSIS — M62.81 GENERALIZED MUSCLE WEAKNESS: ICD-10-CM

## 2019-06-12 DIAGNOSIS — Z78.9 DECREASED ACTIVITIES OF DAILY LIVING (ADL): ICD-10-CM

## 2019-06-12 DIAGNOSIS — M25.552 CHRONIC HIP PAIN, LEFT: ICD-10-CM

## 2019-06-12 DIAGNOSIS — G89.29 CHRONIC HIP PAIN, LEFT: ICD-10-CM

## 2019-06-12 DIAGNOSIS — M79.645 BILATERAL THUMB PAIN: ICD-10-CM

## 2019-06-12 DIAGNOSIS — G89.29 CHRONIC MIDLINE LOW BACK PAIN WITHOUT SCIATICA: ICD-10-CM

## 2019-06-12 DIAGNOSIS — M54.50 CHRONIC MIDLINE LOW BACK PAIN WITHOUT SCIATICA: ICD-10-CM

## 2019-06-12 DIAGNOSIS — Z78.9 IMPAIRED INSTRUMENTAL ACTIVITIES OF DAILY LIVING: ICD-10-CM

## 2019-06-12 DIAGNOSIS — M79.644 BILATERAL THUMB PAIN: ICD-10-CM

## 2019-06-12 DIAGNOSIS — R29.898 WEAKNESS OF BOTH HANDS: ICD-10-CM

## 2019-06-18 ENCOUNTER — HOSPITAL ENCOUNTER (OUTPATIENT)
Dept: PHYSICAL MEDICINE AND REHAB | Facility: CLINIC | Age: 69
Discharge: HOME | End: 2019-06-18
Attending: PHYSICAL MEDICINE & REHABILITATION

## 2019-06-18 DIAGNOSIS — M99.04 SACRAL REGION SOMATIC DYSFUNCTION: ICD-10-CM

## 2019-06-18 DIAGNOSIS — G89.29 CHRONIC LEFT-SIDED LOW BACK PAIN WITHOUT SCIATICA: ICD-10-CM

## 2019-06-18 DIAGNOSIS — M99.00 HEAD REGION SOMATIC DYSFUNCTION: ICD-10-CM

## 2019-06-18 DIAGNOSIS — M99.01 CERVICAL SOMATIC DYSFUNCTION: ICD-10-CM

## 2019-06-18 DIAGNOSIS — M54.50 CHRONIC LEFT-SIDED LOW BACK PAIN WITHOUT SCIATICA: ICD-10-CM

## 2019-06-18 DIAGNOSIS — M99.02 THORACIC REGION SOMATIC DYSFUNCTION: ICD-10-CM

## 2019-06-18 DIAGNOSIS — M99.08 RIB CAGE REGION SOMATIC DYSFUNCTION: ICD-10-CM

## 2019-06-18 DIAGNOSIS — M99.03 LUMBAR REGION SOMATIC DYSFUNCTION: ICD-10-CM

## 2019-06-18 DIAGNOSIS — M53.3 PAIN OF LEFT SACROILIAC JOINT: ICD-10-CM

## 2019-06-18 DIAGNOSIS — M99.05 SOMATIC DYSFUNCTION OF PELVIS REGION: ICD-10-CM

## 2019-06-19 ENCOUNTER — OFFICE VISIT - HEALTHEAST (OUTPATIENT)
Dept: PHYSICAL THERAPY | Facility: REHABILITATION | Age: 69
End: 2019-06-19

## 2019-06-19 ENCOUNTER — OFFICE VISIT - HEALTHEAST (OUTPATIENT)
Dept: OCCUPATIONAL THERAPY | Facility: REHABILITATION | Age: 69
End: 2019-06-19

## 2019-06-19 DIAGNOSIS — R29.898 WEAKNESS OF BOTH HANDS: ICD-10-CM

## 2019-06-19 DIAGNOSIS — M79.645 BILATERAL THUMB PAIN: ICD-10-CM

## 2019-06-19 DIAGNOSIS — M25.552 CHRONIC HIP PAIN, LEFT: ICD-10-CM

## 2019-06-19 DIAGNOSIS — M79.644 BILATERAL THUMB PAIN: ICD-10-CM

## 2019-06-19 DIAGNOSIS — M54.50 CHRONIC MIDLINE LOW BACK PAIN WITHOUT SCIATICA: ICD-10-CM

## 2019-06-19 DIAGNOSIS — M62.81 GENERALIZED MUSCLE WEAKNESS: ICD-10-CM

## 2019-06-19 DIAGNOSIS — Z78.9 DECREASED ACTIVITIES OF DAILY LIVING (ADL): ICD-10-CM

## 2019-06-19 DIAGNOSIS — Z78.9 IMPAIRED INSTRUMENTAL ACTIVITIES OF DAILY LIVING: ICD-10-CM

## 2019-06-19 DIAGNOSIS — G89.29 CHRONIC HIP PAIN, LEFT: ICD-10-CM

## 2019-06-19 DIAGNOSIS — G89.29 CHRONIC MIDLINE LOW BACK PAIN WITHOUT SCIATICA: ICD-10-CM

## 2019-06-25 ENCOUNTER — OFFICE VISIT - HEALTHEAST (OUTPATIENT)
Dept: PHYSICAL THERAPY | Facility: REHABILITATION | Age: 69
End: 2019-06-25

## 2019-06-25 DIAGNOSIS — M62.81 GENERALIZED MUSCLE WEAKNESS: ICD-10-CM

## 2019-06-25 DIAGNOSIS — M54.50 CHRONIC MIDLINE LOW BACK PAIN WITHOUT SCIATICA: ICD-10-CM

## 2019-06-25 DIAGNOSIS — M25.552 CHRONIC HIP PAIN, LEFT: ICD-10-CM

## 2019-06-25 DIAGNOSIS — G89.29 CHRONIC MIDLINE LOW BACK PAIN WITHOUT SCIATICA: ICD-10-CM

## 2019-06-25 DIAGNOSIS — G89.29 CHRONIC HIP PAIN, LEFT: ICD-10-CM

## 2019-06-26 ENCOUNTER — HOSPITAL ENCOUNTER (OUTPATIENT)
Dept: MAMMOGRAPHY | Facility: CLINIC | Age: 69
Discharge: HOME | End: 2019-06-26

## 2019-06-26 ENCOUNTER — RECORDS - HEALTHEAST (OUTPATIENT)
Dept: LAB | Facility: CLINIC | Age: 69
End: 2019-06-26

## 2019-06-26 DIAGNOSIS — Z12.31 VISIT FOR SCREENING MAMMOGRAM: ICD-10-CM

## 2019-06-26 LAB
ANION GAP SERPL CALCULATED.3IONS-SCNC: 10 MMOL/L (ref 5–18)
BUN SERPL-MCNC: 18 MG/DL (ref 8–28)
CALCIUM SERPL-MCNC: 10 MG/DL (ref 8.5–10.5)
CHLORIDE BLD-SCNC: 104 MMOL/L (ref 98–107)
CO2 SERPL-SCNC: 24 MMOL/L (ref 22–31)
CREAT SERPL-MCNC: 0.73 MG/DL (ref 0.6–1.1)
GFR SERPL CREATININE-BSD FRML MDRD: >60 ML/MIN/1.73M2
GLUCOSE BLD-MCNC: 85 MG/DL (ref 70–125)
POTASSIUM BLD-SCNC: 4.3 MMOL/L (ref 3.5–5)
SODIUM SERPL-SCNC: 138 MMOL/L (ref 136–145)

## 2019-07-03 ENCOUNTER — OFFICE VISIT - HEALTHEAST (OUTPATIENT)
Dept: PHYSICAL THERAPY | Facility: REHABILITATION | Age: 69
End: 2019-07-03

## 2019-07-03 DIAGNOSIS — G89.29 CHRONIC MIDLINE LOW BACK PAIN WITHOUT SCIATICA: ICD-10-CM

## 2019-07-03 DIAGNOSIS — M62.81 GENERALIZED MUSCLE WEAKNESS: ICD-10-CM

## 2019-07-03 DIAGNOSIS — M25.552 CHRONIC HIP PAIN, LEFT: ICD-10-CM

## 2019-07-03 DIAGNOSIS — M54.50 CHRONIC MIDLINE LOW BACK PAIN WITHOUT SCIATICA: ICD-10-CM

## 2019-07-03 DIAGNOSIS — G89.29 CHRONIC HIP PAIN, LEFT: ICD-10-CM

## 2019-07-24 ENCOUNTER — RECORDS - HEALTHEAST (OUTPATIENT)
Dept: LAB | Facility: CLINIC | Age: 69
End: 2019-07-24

## 2019-07-27 LAB — BACTERIA SPEC CULT: ABNORMAL

## 2019-08-06 ENCOUNTER — HOSPITAL ENCOUNTER (OUTPATIENT)
Dept: PHYSICAL MEDICINE AND REHAB | Facility: CLINIC | Age: 69
Discharge: HOME | End: 2019-08-06
Attending: PHYSICAL MEDICINE & REHABILITATION

## 2019-08-06 ENCOUNTER — COMMUNICATION - HEALTHEAST (OUTPATIENT)
Dept: TELEHEALTH | Facility: CLINIC | Age: 69
End: 2019-08-06

## 2019-08-06 DIAGNOSIS — M99.01 CERVICAL SOMATIC DYSFUNCTION: ICD-10-CM

## 2019-08-06 DIAGNOSIS — M99.08 RIB CAGE REGION SOMATIC DYSFUNCTION: ICD-10-CM

## 2019-08-06 DIAGNOSIS — M53.3 PAIN OF LEFT SACROILIAC JOINT: ICD-10-CM

## 2019-08-06 DIAGNOSIS — M99.03 LUMBAR REGION SOMATIC DYSFUNCTION: ICD-10-CM

## 2019-08-06 DIAGNOSIS — M99.00 HEAD REGION SOMATIC DYSFUNCTION: ICD-10-CM

## 2019-08-06 DIAGNOSIS — M99.04 SACRAL REGION SOMATIC DYSFUNCTION: ICD-10-CM

## 2019-08-06 DIAGNOSIS — M54.50 CHRONIC LEFT-SIDED LOW BACK PAIN WITHOUT SCIATICA: ICD-10-CM

## 2019-08-06 DIAGNOSIS — M99.05 SOMATIC DYSFUNCTION OF PELVIS REGION: ICD-10-CM

## 2019-08-06 DIAGNOSIS — G89.29 CHRONIC LEFT-SIDED LOW BACK PAIN WITHOUT SCIATICA: ICD-10-CM

## 2019-08-06 DIAGNOSIS — M99.02 THORACIC REGION SOMATIC DYSFUNCTION: ICD-10-CM

## 2019-09-19 ENCOUNTER — HOSPITAL ENCOUNTER (OUTPATIENT)
Dept: PHYSICAL MEDICINE AND REHAB | Facility: CLINIC | Age: 69
Discharge: HOME | End: 2019-09-19
Attending: PHYSICAL MEDICINE & REHABILITATION

## 2019-09-19 DIAGNOSIS — G89.29 CHRONIC LEFT-SIDED LOW BACK PAIN WITHOUT SCIATICA: ICD-10-CM

## 2019-09-19 DIAGNOSIS — M54.50 CHRONIC LEFT-SIDED LOW BACK PAIN WITHOUT SCIATICA: ICD-10-CM

## 2019-09-19 DIAGNOSIS — M53.3 PAIN OF LEFT SACROILIAC JOINT: ICD-10-CM

## 2019-10-04 ENCOUNTER — HOSPITAL ENCOUNTER (OUTPATIENT)
Dept: PHYSICAL MEDICINE AND REHAB | Facility: CLINIC | Age: 69
Discharge: HOME | End: 2019-10-04
Attending: PHYSICAL MEDICINE & REHABILITATION

## 2019-10-04 DIAGNOSIS — M99.00 HEAD REGION SOMATIC DYSFUNCTION: ICD-10-CM

## 2019-10-04 DIAGNOSIS — M54.50 CHRONIC LEFT-SIDED LOW BACK PAIN WITHOUT SCIATICA: ICD-10-CM

## 2019-10-04 DIAGNOSIS — M99.08 RIB CAGE REGION SOMATIC DYSFUNCTION: ICD-10-CM

## 2019-10-04 DIAGNOSIS — M25.552 HIP PAIN, LEFT: ICD-10-CM

## 2019-10-04 DIAGNOSIS — M54.2 NECK PAIN: ICD-10-CM

## 2019-10-04 DIAGNOSIS — M99.02 THORACIC REGION SOMATIC DYSFUNCTION: ICD-10-CM

## 2019-10-04 DIAGNOSIS — M99.04 SACRAL REGION SOMATIC DYSFUNCTION: ICD-10-CM

## 2019-10-04 DIAGNOSIS — M53.3 PAIN OF LEFT SACROILIAC JOINT: ICD-10-CM

## 2019-10-04 DIAGNOSIS — M99.03 LUMBAR REGION SOMATIC DYSFUNCTION: ICD-10-CM

## 2019-10-04 DIAGNOSIS — G89.29 CHRONIC LEFT-SIDED LOW BACK PAIN WITHOUT SCIATICA: ICD-10-CM

## 2019-10-04 DIAGNOSIS — M99.05 SOMATIC DYSFUNCTION OF PELVIS REGION: ICD-10-CM

## 2019-10-04 DIAGNOSIS — M16.12 PRIMARY OSTEOARTHRITIS OF LEFT HIP: ICD-10-CM

## 2019-10-04 DIAGNOSIS — M99.01 CERVICAL SOMATIC DYSFUNCTION: ICD-10-CM

## 2019-10-09 ENCOUNTER — HOSPITAL ENCOUNTER (OUTPATIENT)
Dept: PHYSICAL MEDICINE AND REHAB | Facility: CLINIC | Age: 69
Discharge: HOME | End: 2019-10-09
Attending: PHYSICAL MEDICINE & REHABILITATION

## 2019-10-09 DIAGNOSIS — M25.552 HIP PAIN, LEFT: ICD-10-CM

## 2019-10-09 DIAGNOSIS — M16.12 PRIMARY OSTEOARTHRITIS OF LEFT HIP: ICD-10-CM

## 2019-11-07 ENCOUNTER — HOSPITAL ENCOUNTER (OUTPATIENT)
Dept: PHYSICAL MEDICINE AND REHAB | Facility: CLINIC | Age: 69
Discharge: HOME | End: 2019-11-07
Attending: PHYSICAL MEDICINE & REHABILITATION

## 2019-11-07 DIAGNOSIS — M54.2 NECK PAIN: ICD-10-CM

## 2019-11-07 DIAGNOSIS — M99.00 HEAD REGION SOMATIC DYSFUNCTION: ICD-10-CM

## 2019-11-07 DIAGNOSIS — M99.02 THORACIC REGION SOMATIC DYSFUNCTION: ICD-10-CM

## 2019-11-07 DIAGNOSIS — M99.03 LUMBAR REGION SOMATIC DYSFUNCTION: ICD-10-CM

## 2019-11-07 DIAGNOSIS — M99.08 RIB CAGE REGION SOMATIC DYSFUNCTION: ICD-10-CM

## 2019-11-07 DIAGNOSIS — M99.01 CERVICAL SOMATIC DYSFUNCTION: ICD-10-CM

## 2019-11-07 DIAGNOSIS — M99.04 SACRAL REGION SOMATIC DYSFUNCTION: ICD-10-CM

## 2019-11-07 DIAGNOSIS — M99.05 SOMATIC DYSFUNCTION OF PELVIS REGION: ICD-10-CM

## 2019-11-07 DIAGNOSIS — M25.552 HIP PAIN, LEFT: ICD-10-CM

## 2019-11-07 DIAGNOSIS — M16.12 PRIMARY OSTEOARTHRITIS OF LEFT HIP: ICD-10-CM

## 2019-12-05 ENCOUNTER — HOSPITAL ENCOUNTER (OUTPATIENT)
Dept: PHYSICAL MEDICINE AND REHAB | Facility: CLINIC | Age: 69
Discharge: HOME | End: 2019-12-05
Attending: PHYSICAL MEDICINE & REHABILITATION

## 2019-12-05 DIAGNOSIS — M99.02 THORACIC REGION SOMATIC DYSFUNCTION: ICD-10-CM

## 2019-12-05 DIAGNOSIS — M53.3 SACROILIAC JOINT PAIN: ICD-10-CM

## 2019-12-05 DIAGNOSIS — M99.01 CERVICAL SOMATIC DYSFUNCTION: ICD-10-CM

## 2019-12-05 DIAGNOSIS — M54.50 CHRONIC LEFT-SIDED LOW BACK PAIN WITHOUT SCIATICA: ICD-10-CM

## 2019-12-05 DIAGNOSIS — M99.03 LUMBAR REGION SOMATIC DYSFUNCTION: ICD-10-CM

## 2019-12-05 DIAGNOSIS — M99.08 RIB CAGE REGION SOMATIC DYSFUNCTION: ICD-10-CM

## 2019-12-05 DIAGNOSIS — M99.00 HEAD REGION SOMATIC DYSFUNCTION: ICD-10-CM

## 2019-12-05 DIAGNOSIS — M99.05 SOMATIC DYSFUNCTION OF PELVIS REGION: ICD-10-CM

## 2019-12-05 DIAGNOSIS — M99.04 SACRAL REGION SOMATIC DYSFUNCTION: ICD-10-CM

## 2019-12-05 DIAGNOSIS — G89.29 CHRONIC LEFT-SIDED LOW BACK PAIN WITHOUT SCIATICA: ICD-10-CM

## 2020-03-12 ENCOUNTER — RECORDS - HEALTHEAST (OUTPATIENT)
Dept: LAB | Facility: CLINIC | Age: 70
End: 2020-03-12

## 2020-03-12 LAB
ALBUMIN SERPL-MCNC: 3.7 G/DL (ref 3.5–5)
ALP SERPL-CCNC: 87 U/L (ref 45–120)
ALT SERPL W P-5'-P-CCNC: 79 U/L (ref 0–45)
ANION GAP SERPL CALCULATED.3IONS-SCNC: 9 MMOL/L (ref 5–18)
AST SERPL W P-5'-P-CCNC: 59 U/L (ref 0–40)
BILIRUB SERPL-MCNC: 0.2 MG/DL (ref 0–1)
BUN SERPL-MCNC: 16 MG/DL (ref 8–28)
CALCIUM SERPL-MCNC: 9.5 MG/DL (ref 8.5–10.5)
CHLORIDE BLD-SCNC: 102 MMOL/L (ref 98–107)
CO2 SERPL-SCNC: 26 MMOL/L (ref 22–31)
CREAT SERPL-MCNC: 0.63 MG/DL (ref 0.6–1.1)
GFR SERPL CREATININE-BSD FRML MDRD: >60 ML/MIN/1.73M2
GLUCOSE BLD-MCNC: 91 MG/DL (ref 70–125)
POTASSIUM BLD-SCNC: 4.3 MMOL/L (ref 3.5–5)
PROT SERPL-MCNC: 6.7 G/DL (ref 6–8)
SODIUM SERPL-SCNC: 137 MMOL/L (ref 136–145)
TSH SERPL DL<=0.005 MIU/L-ACNC: 0.94 UIU/ML (ref 0.3–5)

## 2020-03-13 LAB — BNP SERPL-MCNC: 22 PG/ML (ref 0–123)

## 2020-04-28 ENCOUNTER — COMMUNICATION - HEALTHEAST (OUTPATIENT)
Dept: PHYSICAL MEDICINE AND REHAB | Facility: CLINIC | Age: 70
End: 2020-04-28

## 2020-06-05 ENCOUNTER — HOSPITAL ENCOUNTER (OUTPATIENT)
Dept: PHYSICAL MEDICINE AND REHAB | Facility: CLINIC | Age: 70
Discharge: HOME | End: 2020-06-05
Attending: PHYSICAL MEDICINE & REHABILITATION

## 2020-06-05 ENCOUNTER — COMMUNICATION - HEALTHEAST (OUTPATIENT)
Dept: TELEHEALTH | Facility: CLINIC | Age: 70
End: 2020-06-05

## 2020-06-05 DIAGNOSIS — M25.551 HIP PAIN, BILATERAL: ICD-10-CM

## 2020-06-05 DIAGNOSIS — M99.04 SACRAL REGION SOMATIC DYSFUNCTION: ICD-10-CM

## 2020-06-05 DIAGNOSIS — M99.05 SOMATIC DYSFUNCTION OF PELVIS REGION: ICD-10-CM

## 2020-06-05 DIAGNOSIS — G89.29 CHRONIC BILATERAL LOW BACK PAIN WITHOUT SCIATICA: ICD-10-CM

## 2020-06-05 DIAGNOSIS — M99.01 CERVICAL SOMATIC DYSFUNCTION: ICD-10-CM

## 2020-06-05 DIAGNOSIS — M99.03 LUMBAR REGION SOMATIC DYSFUNCTION: ICD-10-CM

## 2020-06-05 DIAGNOSIS — M99.02 THORACIC REGION SOMATIC DYSFUNCTION: ICD-10-CM

## 2020-06-05 DIAGNOSIS — M53.3 SACROILIAC JOINT PAIN: ICD-10-CM

## 2020-06-05 DIAGNOSIS — M79.18 BILATERAL BUTTOCK PAIN: ICD-10-CM

## 2020-06-05 DIAGNOSIS — M25.552 HIP PAIN, BILATERAL: ICD-10-CM

## 2020-06-05 DIAGNOSIS — G89.29 CHRONIC PAIN OF BOTH KNEES: ICD-10-CM

## 2020-06-05 DIAGNOSIS — M25.562 CHRONIC PAIN OF BOTH KNEES: ICD-10-CM

## 2020-06-05 DIAGNOSIS — M54.50 CHRONIC BILATERAL LOW BACK PAIN WITHOUT SCIATICA: ICD-10-CM

## 2020-06-05 DIAGNOSIS — M25.561 CHRONIC PAIN OF BOTH KNEES: ICD-10-CM

## 2020-06-05 DIAGNOSIS — M99.00 HEAD REGION SOMATIC DYSFUNCTION: ICD-10-CM

## 2020-06-05 DIAGNOSIS — M99.08 RIB CAGE REGION SOMATIC DYSFUNCTION: ICD-10-CM

## 2020-06-05 RX ORDER — FAMOTIDINE 40 MG/1
TABLET, FILM COATED ORAL
Status: SHIPPED | COMMUNITY
Start: 2020-05-19

## 2020-06-08 ENCOUNTER — TELEPHONE (OUTPATIENT)
Dept: OTHER | Facility: CLINIC | Age: 70
End: 2020-06-08

## 2020-06-08 NOTE — TELEPHONE ENCOUNTER
Relayed message to patient.    Hortensia Brunner RN BSN  [facility]  [phone]

## 2020-06-08 NOTE — TELEPHONE ENCOUNTER
Once her date confirmed for operation will see one week before if it is closer then I will see her after the procedure     LG

## 2020-06-08 NOTE — TELEPHONE ENCOUNTER
Holly called, left voice message:    She was supposed to see Dr. Pierce in February but was unable to do that.  Her kidney operation has been rescheduled for August.  She is wondering if Dr. Pierce would like to see her before or after the operation.    Routing to Dr. Pierce to advise.    Hortensia Brunner RN BSN  [facility]  [phone]

## 2020-06-11 ENCOUNTER — HOSPITAL ENCOUNTER (OUTPATIENT)
Dept: PHYSICAL MEDICINE AND REHAB | Facility: CLINIC | Age: 70
Discharge: HOME | End: 2020-06-11
Attending: PHYSICAL MEDICINE & REHABILITATION

## 2020-06-11 DIAGNOSIS — M25.552 HIP PAIN, BILATERAL: ICD-10-CM

## 2020-06-11 DIAGNOSIS — M25.551 HIP PAIN, BILATERAL: ICD-10-CM

## 2020-06-19 ENCOUNTER — HOSPITAL ENCOUNTER (OUTPATIENT)
Dept: PHYSICAL MEDICINE AND REHAB | Facility: CLINIC | Age: 70
Discharge: HOME | End: 2020-06-19
Attending: PHYSICAL MEDICINE & REHABILITATION

## 2020-06-19 DIAGNOSIS — M99.08 RIB CAGE REGION SOMATIC DYSFUNCTION: ICD-10-CM

## 2020-06-19 DIAGNOSIS — M99.04 SACRAL REGION SOMATIC DYSFUNCTION: ICD-10-CM

## 2020-06-19 DIAGNOSIS — M99.01 CERVICAL SOMATIC DYSFUNCTION: ICD-10-CM

## 2020-06-19 DIAGNOSIS — M99.05 SOMATIC DYSFUNCTION OF PELVIS REGION: ICD-10-CM

## 2020-06-19 DIAGNOSIS — M99.00 HEAD REGION SOMATIC DYSFUNCTION: ICD-10-CM

## 2020-06-19 DIAGNOSIS — G89.29 CHRONIC BILATERAL LOW BACK PAIN WITHOUT SCIATICA: ICD-10-CM

## 2020-06-19 DIAGNOSIS — M99.02 THORACIC REGION SOMATIC DYSFUNCTION: ICD-10-CM

## 2020-06-19 DIAGNOSIS — M53.3 SACROILIAC JOINT PAIN: ICD-10-CM

## 2020-06-19 DIAGNOSIS — M99.03 LUMBAR REGION SOMATIC DYSFUNCTION: ICD-10-CM

## 2020-06-19 DIAGNOSIS — M54.50 CHRONIC BILATERAL LOW BACK PAIN WITHOUT SCIATICA: ICD-10-CM

## 2020-07-02 ENCOUNTER — HOSPITAL ENCOUNTER (OUTPATIENT)
Dept: PHYSICAL MEDICINE AND REHAB | Facility: CLINIC | Age: 70
Discharge: HOME | End: 2020-07-02
Attending: PHYSICAL MEDICINE & REHABILITATION

## 2020-07-02 DIAGNOSIS — M53.3 SACROILIAC JOINT PAIN: ICD-10-CM

## 2020-07-06 ENCOUNTER — RECORDS - HEALTHEAST (OUTPATIENT)
Dept: LAB | Facility: CLINIC | Age: 70
End: 2020-07-06

## 2020-07-06 LAB
ALBUMIN SERPL-MCNC: 3.7 G/DL (ref 3.5–5)
ALP SERPL-CCNC: 96 U/L (ref 45–120)
ALT SERPL W P-5'-P-CCNC: 22 U/L (ref 0–45)
ANION GAP SERPL CALCULATED.3IONS-SCNC: 14 MMOL/L (ref 5–18)
AST SERPL W P-5'-P-CCNC: 19 U/L (ref 0–40)
BILIRUB SERPL-MCNC: 0.3 MG/DL (ref 0–1)
BUN SERPL-MCNC: 16 MG/DL (ref 8–28)
CALCIUM SERPL-MCNC: 10 MG/DL (ref 8.5–10.5)
CHLORIDE BLD-SCNC: 102 MMOL/L (ref 98–107)
CO2 SERPL-SCNC: 23 MMOL/L (ref 22–31)
CREAT SERPL-MCNC: 0.81 MG/DL (ref 0.6–1.1)
GFR SERPL CREATININE-BSD FRML MDRD: >60 ML/MIN/1.73M2
GLUCOSE BLD-MCNC: 122 MG/DL (ref 70–125)
POTASSIUM BLD-SCNC: 3.9 MMOL/L (ref 3.5–5)
PROT SERPL-MCNC: 7 G/DL (ref 6–8)
SODIUM SERPL-SCNC: 139 MMOL/L (ref 136–145)

## 2020-07-16 ENCOUNTER — HOSPITAL ENCOUNTER (OUTPATIENT)
Dept: PHYSICAL MEDICINE AND REHAB | Facility: CLINIC | Age: 70
Discharge: HOME | End: 2020-07-16
Attending: PHYSICAL MEDICINE & REHABILITATION

## 2020-07-16 DIAGNOSIS — M99.02 THORACIC REGION SOMATIC DYSFUNCTION: ICD-10-CM

## 2020-07-16 DIAGNOSIS — M99.04 SACRAL REGION SOMATIC DYSFUNCTION: ICD-10-CM

## 2020-07-16 DIAGNOSIS — M17.0 PRIMARY OSTEOARTHRITIS OF BOTH KNEES: ICD-10-CM

## 2020-07-16 DIAGNOSIS — M25.561 BILATERAL CHRONIC KNEE PAIN: ICD-10-CM

## 2020-07-16 DIAGNOSIS — M99.03 LUMBAR REGION SOMATIC DYSFUNCTION: ICD-10-CM

## 2020-07-16 DIAGNOSIS — M99.05 SOMATIC DYSFUNCTION OF PELVIS REGION: ICD-10-CM

## 2020-07-16 DIAGNOSIS — M53.3 SACROILIAC JOINT PAIN: ICD-10-CM

## 2020-07-16 DIAGNOSIS — M99.00 HEAD REGION SOMATIC DYSFUNCTION: ICD-10-CM

## 2020-07-16 DIAGNOSIS — M99.06 SOMATIC DYSFUNCTION OF LOWER EXTREMITY: ICD-10-CM

## 2020-07-16 DIAGNOSIS — G89.29 CHRONIC BILATERAL LOW BACK PAIN WITHOUT SCIATICA: ICD-10-CM

## 2020-07-16 DIAGNOSIS — M99.01 CERVICAL SOMATIC DYSFUNCTION: ICD-10-CM

## 2020-07-16 DIAGNOSIS — M54.50 CHRONIC BILATERAL LOW BACK PAIN WITHOUT SCIATICA: ICD-10-CM

## 2020-07-16 DIAGNOSIS — M25.562 BILATERAL CHRONIC KNEE PAIN: ICD-10-CM

## 2020-07-16 DIAGNOSIS — G89.29 BILATERAL CHRONIC KNEE PAIN: ICD-10-CM

## 2020-07-16 DIAGNOSIS — M99.08 RIB CAGE REGION SOMATIC DYSFUNCTION: ICD-10-CM

## 2020-07-20 ENCOUNTER — OFFICE VISIT - HEALTHEAST (OUTPATIENT)
Dept: PHYSICAL THERAPY | Facility: REHABILITATION | Age: 70
End: 2020-07-20

## 2020-07-20 DIAGNOSIS — M25.562 BILATERAL CHRONIC KNEE PAIN: ICD-10-CM

## 2020-07-20 DIAGNOSIS — G89.29 BILATERAL CHRONIC KNEE PAIN: ICD-10-CM

## 2020-07-20 DIAGNOSIS — M25.561 BILATERAL CHRONIC KNEE PAIN: ICD-10-CM

## 2020-07-27 ENCOUNTER — OFFICE VISIT - HEALTHEAST (OUTPATIENT)
Dept: PHYSICAL THERAPY | Facility: REHABILITATION | Age: 70
End: 2020-07-27

## 2020-07-27 DIAGNOSIS — M25.562 BILATERAL CHRONIC KNEE PAIN: ICD-10-CM

## 2020-07-27 DIAGNOSIS — M62.81 GENERALIZED MUSCLE WEAKNESS: ICD-10-CM

## 2020-07-27 DIAGNOSIS — M25.552 CHRONIC HIP PAIN, LEFT: ICD-10-CM

## 2020-07-27 DIAGNOSIS — M25.561 BILATERAL CHRONIC KNEE PAIN: ICD-10-CM

## 2020-07-27 DIAGNOSIS — G89.29 BILATERAL CHRONIC KNEE PAIN: ICD-10-CM

## 2020-07-27 DIAGNOSIS — G89.29 CHRONIC MIDLINE LOW BACK PAIN WITHOUT SCIATICA: ICD-10-CM

## 2020-07-27 DIAGNOSIS — G89.29 CHRONIC HIP PAIN, LEFT: ICD-10-CM

## 2020-07-27 DIAGNOSIS — M54.50 CHRONIC MIDLINE LOW BACK PAIN WITHOUT SCIATICA: ICD-10-CM

## 2020-07-28 ENCOUNTER — HOSPITAL ENCOUNTER (OUTPATIENT)
Dept: MAMMOGRAPHY | Facility: CLINIC | Age: 70
Discharge: HOME | End: 2020-07-28

## 2020-07-28 DIAGNOSIS — Z12.31 VISIT FOR SCREENING MAMMOGRAM: ICD-10-CM

## 2020-08-04 ENCOUNTER — OFFICE VISIT - HEALTHEAST (OUTPATIENT)
Dept: PHYSICAL THERAPY | Facility: REHABILITATION | Age: 70
End: 2020-08-04

## 2020-08-04 DIAGNOSIS — M62.81 GENERALIZED MUSCLE WEAKNESS: ICD-10-CM

## 2020-08-04 DIAGNOSIS — M25.561 BILATERAL CHRONIC KNEE PAIN: ICD-10-CM

## 2020-08-04 DIAGNOSIS — M54.50 CHRONIC MIDLINE LOW BACK PAIN WITHOUT SCIATICA: ICD-10-CM

## 2020-08-04 DIAGNOSIS — G89.29 BILATERAL CHRONIC KNEE PAIN: ICD-10-CM

## 2020-08-04 DIAGNOSIS — G89.29 CHRONIC MIDLINE LOW BACK PAIN WITHOUT SCIATICA: ICD-10-CM

## 2020-08-04 DIAGNOSIS — M25.562 BILATERAL CHRONIC KNEE PAIN: ICD-10-CM

## 2020-08-04 DIAGNOSIS — G89.29 CHRONIC HIP PAIN, LEFT: ICD-10-CM

## 2020-08-04 DIAGNOSIS — M25.552 CHRONIC HIP PAIN, LEFT: ICD-10-CM

## 2020-08-07 ENCOUNTER — OFFICE VISIT - HEALTHEAST (OUTPATIENT)
Dept: PHYSICAL THERAPY | Facility: REHABILITATION | Age: 70
End: 2020-08-07

## 2020-08-07 DIAGNOSIS — G89.29 CHRONIC HIP PAIN, LEFT: ICD-10-CM

## 2020-08-07 DIAGNOSIS — M62.81 GENERALIZED MUSCLE WEAKNESS: ICD-10-CM

## 2020-08-07 DIAGNOSIS — M25.552 CHRONIC HIP PAIN, LEFT: ICD-10-CM

## 2020-08-07 DIAGNOSIS — M25.562 BILATERAL CHRONIC KNEE PAIN: ICD-10-CM

## 2020-08-07 DIAGNOSIS — G89.29 CHRONIC MIDLINE LOW BACK PAIN WITHOUT SCIATICA: ICD-10-CM

## 2020-08-07 DIAGNOSIS — M54.50 CHRONIC MIDLINE LOW BACK PAIN WITHOUT SCIATICA: ICD-10-CM

## 2020-08-07 DIAGNOSIS — M25.561 BILATERAL CHRONIC KNEE PAIN: ICD-10-CM

## 2020-08-07 DIAGNOSIS — G89.29 BILATERAL CHRONIC KNEE PAIN: ICD-10-CM

## 2020-08-10 ENCOUNTER — OFFICE VISIT - HEALTHEAST (OUTPATIENT)
Dept: PHYSICAL THERAPY | Facility: REHABILITATION | Age: 70
End: 2020-08-10

## 2020-08-10 DIAGNOSIS — G89.29 CHRONIC HIP PAIN, LEFT: ICD-10-CM

## 2020-08-10 DIAGNOSIS — M25.561 BILATERAL CHRONIC KNEE PAIN: ICD-10-CM

## 2020-08-10 DIAGNOSIS — M25.552 CHRONIC HIP PAIN, LEFT: ICD-10-CM

## 2020-08-10 DIAGNOSIS — G89.29 BILATERAL CHRONIC KNEE PAIN: ICD-10-CM

## 2020-08-10 DIAGNOSIS — M54.50 CHRONIC MIDLINE LOW BACK PAIN WITHOUT SCIATICA: ICD-10-CM

## 2020-08-10 DIAGNOSIS — G89.29 CHRONIC MIDLINE LOW BACK PAIN WITHOUT SCIATICA: ICD-10-CM

## 2020-08-10 DIAGNOSIS — M62.81 GENERALIZED MUSCLE WEAKNESS: ICD-10-CM

## 2020-08-10 DIAGNOSIS — M25.562 BILATERAL CHRONIC KNEE PAIN: ICD-10-CM

## 2020-08-12 ENCOUNTER — HOSPITAL ENCOUNTER (OUTPATIENT)
Dept: PHYSICAL MEDICINE AND REHAB | Facility: CLINIC | Age: 70
Discharge: HOME | End: 2020-08-12
Attending: PHYSICAL MEDICINE & REHABILITATION

## 2020-08-12 DIAGNOSIS — M99.05 SOMATIC DYSFUNCTION OF PELVIS REGION: ICD-10-CM

## 2020-08-12 DIAGNOSIS — M99.03 LUMBAR REGION SOMATIC DYSFUNCTION: ICD-10-CM

## 2020-08-12 DIAGNOSIS — M99.02 THORACIC REGION SOMATIC DYSFUNCTION: ICD-10-CM

## 2020-08-12 DIAGNOSIS — M99.00 HEAD REGION SOMATIC DYSFUNCTION: ICD-10-CM

## 2020-08-12 DIAGNOSIS — M79.18 RIGHT BUTTOCK PAIN: ICD-10-CM

## 2020-08-12 DIAGNOSIS — M99.08 RIB CAGE REGION SOMATIC DYSFUNCTION: ICD-10-CM

## 2020-08-12 DIAGNOSIS — M70.71: ICD-10-CM

## 2020-08-12 DIAGNOSIS — G89.29 CHRONIC BILATERAL LOW BACK PAIN WITHOUT SCIATICA: ICD-10-CM

## 2020-08-12 DIAGNOSIS — M54.50 CHRONIC BILATERAL LOW BACK PAIN WITHOUT SCIATICA: ICD-10-CM

## 2020-08-12 DIAGNOSIS — M99.01 CERVICAL SOMATIC DYSFUNCTION: ICD-10-CM

## 2020-08-12 DIAGNOSIS — M99.04 SACRAL REGION SOMATIC DYSFUNCTION: ICD-10-CM

## 2020-08-12 DIAGNOSIS — M54.2 NECK PAIN: ICD-10-CM

## 2020-08-18 ENCOUNTER — OFFICE VISIT - HEALTHEAST (OUTPATIENT)
Dept: PHYSICAL THERAPY | Facility: REHABILITATION | Age: 70
End: 2020-08-18

## 2020-08-18 DIAGNOSIS — G89.29 CHRONIC HIP PAIN, LEFT: ICD-10-CM

## 2020-08-18 DIAGNOSIS — M25.552 CHRONIC HIP PAIN, LEFT: ICD-10-CM

## 2020-08-18 DIAGNOSIS — M54.50 CHRONIC MIDLINE LOW BACK PAIN WITHOUT SCIATICA: ICD-10-CM

## 2020-08-18 DIAGNOSIS — M62.81 GENERALIZED MUSCLE WEAKNESS: ICD-10-CM

## 2020-08-18 DIAGNOSIS — M25.562 BILATERAL CHRONIC KNEE PAIN: ICD-10-CM

## 2020-08-18 DIAGNOSIS — G89.29 CHRONIC MIDLINE LOW BACK PAIN WITHOUT SCIATICA: ICD-10-CM

## 2020-08-18 DIAGNOSIS — M25.561 BILATERAL CHRONIC KNEE PAIN: ICD-10-CM

## 2020-08-18 DIAGNOSIS — G89.29 BILATERAL CHRONIC KNEE PAIN: ICD-10-CM

## 2020-08-21 ENCOUNTER — OFFICE VISIT - HEALTHEAST (OUTPATIENT)
Dept: PHYSICAL THERAPY | Facility: REHABILITATION | Age: 70
End: 2020-08-21

## 2020-08-21 DIAGNOSIS — M25.561 BILATERAL CHRONIC KNEE PAIN: ICD-10-CM

## 2020-08-21 DIAGNOSIS — M25.552 CHRONIC HIP PAIN, LEFT: ICD-10-CM

## 2020-08-21 DIAGNOSIS — G89.29 BILATERAL CHRONIC KNEE PAIN: ICD-10-CM

## 2020-08-21 DIAGNOSIS — M25.562 BILATERAL CHRONIC KNEE PAIN: ICD-10-CM

## 2020-08-21 DIAGNOSIS — M54.50 CHRONIC MIDLINE LOW BACK PAIN WITHOUT SCIATICA: ICD-10-CM

## 2020-08-21 DIAGNOSIS — M62.81 GENERALIZED MUSCLE WEAKNESS: ICD-10-CM

## 2020-08-21 DIAGNOSIS — G89.29 CHRONIC MIDLINE LOW BACK PAIN WITHOUT SCIATICA: ICD-10-CM

## 2020-08-21 DIAGNOSIS — G89.29 CHRONIC HIP PAIN, LEFT: ICD-10-CM

## 2020-09-08 ENCOUNTER — HOSPITAL ENCOUNTER (OUTPATIENT)
Dept: PHYSICAL MEDICINE AND REHAB | Facility: CLINIC | Age: 70
Discharge: HOME | End: 2020-09-08
Attending: PHYSICAL MEDICINE & REHABILITATION

## 2020-09-08 DIAGNOSIS — M99.03 LUMBAR REGION SOMATIC DYSFUNCTION: ICD-10-CM

## 2020-09-08 DIAGNOSIS — M99.01 CERVICAL SOMATIC DYSFUNCTION: ICD-10-CM

## 2020-09-08 DIAGNOSIS — M99.04 SACRAL REGION SOMATIC DYSFUNCTION: ICD-10-CM

## 2020-09-08 DIAGNOSIS — M99.05 SOMATIC DYSFUNCTION OF PELVIS REGION: ICD-10-CM

## 2020-09-08 DIAGNOSIS — M54.50 CHRONIC MIDLINE LOW BACK PAIN WITHOUT SCIATICA: ICD-10-CM

## 2020-09-08 DIAGNOSIS — M99.00 HEAD REGION SOMATIC DYSFUNCTION: ICD-10-CM

## 2020-09-08 DIAGNOSIS — M99.02 THORACIC REGION SOMATIC DYSFUNCTION: ICD-10-CM

## 2020-09-08 DIAGNOSIS — M99.08 RIB CAGE REGION SOMATIC DYSFUNCTION: ICD-10-CM

## 2020-09-08 DIAGNOSIS — G89.29 CHRONIC MIDLINE LOW BACK PAIN WITHOUT SCIATICA: ICD-10-CM

## 2020-09-16 ENCOUNTER — OFFICE VISIT - HEALTHEAST (OUTPATIENT)
Dept: PHYSICAL THERAPY | Facility: REHABILITATION | Age: 70
End: 2020-09-16

## 2020-09-16 DIAGNOSIS — M25.562 BILATERAL CHRONIC KNEE PAIN: ICD-10-CM

## 2020-09-16 DIAGNOSIS — M25.561 BILATERAL CHRONIC KNEE PAIN: ICD-10-CM

## 2020-09-16 DIAGNOSIS — M25.552 CHRONIC HIP PAIN, LEFT: ICD-10-CM

## 2020-09-16 DIAGNOSIS — M54.50 CHRONIC MIDLINE LOW BACK PAIN WITHOUT SCIATICA: ICD-10-CM

## 2020-09-16 DIAGNOSIS — G89.29 CHRONIC HIP PAIN, LEFT: ICD-10-CM

## 2020-09-16 DIAGNOSIS — G89.29 BILATERAL CHRONIC KNEE PAIN: ICD-10-CM

## 2020-09-16 DIAGNOSIS — M62.81 GENERALIZED MUSCLE WEAKNESS: ICD-10-CM

## 2020-09-16 DIAGNOSIS — G89.29 CHRONIC MIDLINE LOW BACK PAIN WITHOUT SCIATICA: ICD-10-CM

## 2020-10-07 ENCOUNTER — HOSPITAL ENCOUNTER (OUTPATIENT)
Dept: PHYSICAL MEDICINE AND REHAB | Facility: CLINIC | Age: 70
Discharge: HOME | End: 2020-10-07
Attending: PHYSICAL MEDICINE & REHABILITATION

## 2020-10-07 DIAGNOSIS — M54.50 CHRONIC MIDLINE LOW BACK PAIN WITHOUT SCIATICA: ICD-10-CM

## 2020-10-07 DIAGNOSIS — M99.01 CERVICAL SOMATIC DYSFUNCTION: ICD-10-CM

## 2020-10-07 DIAGNOSIS — M99.03 LUMBAR REGION SOMATIC DYSFUNCTION: ICD-10-CM

## 2020-10-07 DIAGNOSIS — G89.29 CHRONIC MIDLINE LOW BACK PAIN WITHOUT SCIATICA: ICD-10-CM

## 2020-10-07 DIAGNOSIS — M99.05 SOMATIC DYSFUNCTION OF PELVIS REGION: ICD-10-CM

## 2020-10-07 DIAGNOSIS — M99.04 SACRAL REGION SOMATIC DYSFUNCTION: ICD-10-CM

## 2020-10-07 DIAGNOSIS — M99.08 RIB CAGE REGION SOMATIC DYSFUNCTION: ICD-10-CM

## 2020-10-07 DIAGNOSIS — M99.02 THORACIC REGION SOMATIC DYSFUNCTION: ICD-10-CM

## 2020-10-13 ENCOUNTER — OFFICE VISIT - HEALTHEAST (OUTPATIENT)
Dept: PHYSICAL THERAPY | Facility: REHABILITATION | Age: 70
End: 2020-10-13

## 2020-10-13 DIAGNOSIS — M25.552 CHRONIC HIP PAIN, LEFT: ICD-10-CM

## 2020-10-13 DIAGNOSIS — M62.81 GENERALIZED MUSCLE WEAKNESS: ICD-10-CM

## 2020-10-13 DIAGNOSIS — G89.29 BILATERAL CHRONIC KNEE PAIN: ICD-10-CM

## 2020-10-13 DIAGNOSIS — M54.50 CHRONIC MIDLINE LOW BACK PAIN WITHOUT SCIATICA: ICD-10-CM

## 2020-10-13 DIAGNOSIS — G89.29 CHRONIC MIDLINE LOW BACK PAIN WITHOUT SCIATICA: ICD-10-CM

## 2020-10-13 DIAGNOSIS — G89.29 CHRONIC HIP PAIN, LEFT: ICD-10-CM

## 2020-10-13 DIAGNOSIS — M25.561 BILATERAL CHRONIC KNEE PAIN: ICD-10-CM

## 2020-10-13 DIAGNOSIS — M25.562 BILATERAL CHRONIC KNEE PAIN: ICD-10-CM

## 2020-12-15 ENCOUNTER — COMMUNICATION - HEALTHEAST (OUTPATIENT)
Dept: PHYSICAL MEDICINE AND REHAB | Facility: CLINIC | Age: 70
End: 2020-12-15

## 2020-12-15 DIAGNOSIS — M25.562 PAIN IN BOTH KNEES, UNSPECIFIED CHRONICITY: ICD-10-CM

## 2020-12-15 DIAGNOSIS — M79.18 LEFT BUTTOCK PAIN: ICD-10-CM

## 2020-12-15 DIAGNOSIS — M25.561 PAIN IN BOTH KNEES, UNSPECIFIED CHRONICITY: ICD-10-CM

## 2021-03-07 ENCOUNTER — COMMUNICATION - HEALTHEAST (OUTPATIENT)
Dept: PHYSICAL MEDICINE AND REHAB | Facility: CLINIC | Age: 71
End: 2021-03-07

## 2021-03-07 DIAGNOSIS — M25.562 PAIN IN BOTH KNEES, UNSPECIFIED CHRONICITY: ICD-10-CM

## 2021-03-07 DIAGNOSIS — M79.18 LEFT BUTTOCK PAIN: ICD-10-CM

## 2021-03-07 DIAGNOSIS — M25.561 PAIN IN BOTH KNEES, UNSPECIFIED CHRONICITY: ICD-10-CM

## 2021-04-02 ENCOUNTER — RECORDS - HEALTHEAST (OUTPATIENT)
Dept: SCHEDULING | Facility: CLINIC | Age: 71
End: 2021-04-02

## 2021-04-02 DIAGNOSIS — Z12.31 VISIT FOR SCREENING MAMMOGRAM: ICD-10-CM

## 2021-05-04 ENCOUNTER — HOSPITAL ENCOUNTER (OUTPATIENT)
Dept: PHYSICAL MEDICINE AND REHAB | Facility: CLINIC | Age: 71
Discharge: HOME | End: 2021-05-04
Attending: PHYSICAL MEDICINE & REHABILITATION
Payer: COMMERCIAL

## 2021-05-04 DIAGNOSIS — M99.02 THORACIC REGION SOMATIC DYSFUNCTION: ICD-10-CM

## 2021-05-04 DIAGNOSIS — M17.11 PRIMARY OSTEOARTHRITIS OF RIGHT KNEE: ICD-10-CM

## 2021-05-04 DIAGNOSIS — M99.00 HEAD REGION SOMATIC DYSFUNCTION: ICD-10-CM

## 2021-05-04 DIAGNOSIS — M99.04 SACRAL REGION SOMATIC DYSFUNCTION: ICD-10-CM

## 2021-05-04 DIAGNOSIS — M99.03 LUMBAR REGION SOMATIC DYSFUNCTION: ICD-10-CM

## 2021-05-04 DIAGNOSIS — M25.561 CHRONIC PAIN OF RIGHT KNEE: ICD-10-CM

## 2021-05-04 DIAGNOSIS — M99.08 RIB CAGE REGION SOMATIC DYSFUNCTION: ICD-10-CM

## 2021-05-04 DIAGNOSIS — M54.50 CHRONIC MIDLINE LOW BACK PAIN WITHOUT SCIATICA: ICD-10-CM

## 2021-05-04 DIAGNOSIS — G89.29 CHRONIC MIDLINE LOW BACK PAIN WITHOUT SCIATICA: ICD-10-CM

## 2021-05-04 DIAGNOSIS — M99.01 CERVICAL SOMATIC DYSFUNCTION: ICD-10-CM

## 2021-05-04 DIAGNOSIS — G89.29 CHRONIC PAIN OF RIGHT KNEE: ICD-10-CM

## 2021-05-04 DIAGNOSIS — M99.05 SOMATIC DYSFUNCTION OF PELVIS REGION: ICD-10-CM

## 2021-05-13 ENCOUNTER — HOSPITAL ENCOUNTER (OUTPATIENT)
Dept: PHYSICAL MEDICINE AND REHAB | Facility: CLINIC | Age: 71
Discharge: HOME | End: 2021-05-13
Attending: PHYSICAL MEDICINE & REHABILITATION
Payer: COMMERCIAL

## 2021-05-13 DIAGNOSIS — M17.11 PRIMARY OSTEOARTHRITIS OF RIGHT KNEE: ICD-10-CM

## 2021-05-25 ENCOUNTER — RECORDS - HEALTHEAST (OUTPATIENT)
Dept: ADMINISTRATIVE | Facility: CLINIC | Age: 71
End: 2021-05-25

## 2021-05-27 ENCOUNTER — RECORDS - HEALTHEAST (OUTPATIENT)
Dept: ADMINISTRATIVE | Facility: CLINIC | Age: 71
End: 2021-05-27

## 2021-05-27 VITALS — WEIGHT: 213.2 LBS

## 2021-05-27 NOTE — PATIENT INSTRUCTIONS - HE
DISCHARGE INSTRUCTIONS    During office hours (8:00 a.m.- 4:30 p.m.) questions or concerns may be answered  by calling Spine Navigation Nurses at  [phone].     If you experience any problems after hours  please call [phone] and you will be connected to [facility] Connection.     All Patients:    ? You may experience an increase in your symptoms for the first 2 days (It may take anywhere between 2 days- 2 weeks for the steroid to have maximum effect).    ? You may use ice on the injection site, as frequently as 20 minutes each hour if needed.    ? You may take your pain medicine.    ? You may continue taking your regular medication after your injection. If you have had a Medial Branch Block you may resume pain medication once your pain diary is completed.    ? You may shower. No swimming, tub bath or hot tub for 48 hours.  You may remove your bandaid/bandage as soon as you are home.    ? You may resume light activities, as tolerated.    ? Resume your usual diet as tolerated.    ? It is strongly advised that you do not drive for 1-3 hours post injection.    ? If you have had oral sedation:  Do not drive for 8 hours post injection.      ? If you have had IV sedation:  Do not drive for 24 hours post injection.  Do not operate hazardous machinery or make important personal/business decisions for 24 hours.      POSSIBLE STEROID SIDE EFFECTS (If steroid/cortisone was used for your procedure)    -If you experience these symptoms, it should only last for a short period      Swelling of the legs                Skin redness (flushing)       Mouth (oral) irritation     Blood sugar (glucose) levels              Sweats                      Mood changes    Headache    Sleeplessness         POSSIBLE PROCEDURE SIDE EFFECTS  -Call the Spine Center if you are concerned    Increased Pain             Increased numbness/tingling        Nausea/Vomiting            Bruising/bleeding at site        Redness or swelling                                                 Difficulty walking        Weakness             Fever greater than 100.5    *In the event of a severe headache after an epidural steroid injection that is relieved by lying down, please call the [facility] to speak with a clinical staff member*

## 2021-05-27 NOTE — TELEPHONE ENCOUNTER
----- Message from Zuhair Beltre DO sent at 4/2/2019  1:52 PM CDT -----  Regarding: RE: 6-Hour Pain Diary  Please order therapeutic hip injection and schedule patient.  ----- Message -----  From: Dia Sotelo LPN  Sent: 4/2/2019   1:24 PM  To: Zuhair Beltre DO, #  Subject: FW: 6-Hour Pain Diary                            Pain diary was received and scanned into media.   ----- Message -----  From: Dia Sotelo LPN  Sent: 3/27/2019   3:42 PM  To: [facility] Procedure Support Pool  Subject: 6-Hour Pain Diary                                Patient was here for a diagnostic left hip injection under ultrasound. Patient was given 6-hour pain diary with explanation. Awaiting return of the pain diary.

## 2021-05-27 NOTE — TELEPHONE ENCOUNTER
Phone call to patient to let her know Dr. Beltre would like to order a therapeutic left hip injection under ultrasound and get her on the schedule for this. Left voicemail to call back.

## 2021-05-27 NOTE — PATIENT INSTRUCTIONS - HE
Sylvia Young, DO Juana Anders, KATYA Warren,  DO Palak Shelton, KATYA Artis PA-C, DC                                                                      TAYLOR Figueroa PA-C    DISCHARGE INSTRUCTIONS  During office hours (8:00 a.m.- 4:30 p.m.) questions or concerns may be answered  by calling Spine Navigation Nurses at [phone]. If you experience any problems after hours please call [phone] and you will be connected to [facility] Connection.     All Patients:  ? You may experience an increase in your symptoms or have some soreness from the injection for the first 2 days (It may take anywhere between 2 days- 2 weeks for the steroid to have maximum effect).  ? You may use ice on the injection site, as frequently as 20 minutes each hour if needed.  ? You may continue taking your regular medication.  ? You may shower. No swimming, tub bath or hot tub for 48 hours.  You may remove your   bandaid/bandage as soon as you are home.  ? You may resume light activities, as tolerated unless otherwise directed.  ? Resume your usual diet as tolerated.      POSSIBLE STEROID SIDE EFFECTS   (If steroid/cortisone was used for your procedure)  -If you experience these symptoms, it should only last for a short period  Swelling of the legs        Skin redness (flushing)  Mouth (oral) irritation   Blood sugar (glucose) levels      Sweats                          Mood changes  Severe headache                  Sleeplessness            POSSIBLE PROCEDURE SIDE EFFECTS  -Call the Spine Center if you are concerned  Increased Pain      Bruising/bleeding at site                  Redness or swelling  Fever greater than 100.5            Diffuse rash            THESE INSTRUCTIONS HAVE BEEN EXPLAINED TO THE PATIENT AND THE PATIENT/PATIENT REPRESENTATITVE HAS  VERBALIZED UNDERSTANDING.  A COPY OF THE INSTRUCTIONS HAVE BEEN GIVEN TO THE PATIENT/PATIENT REPRESENTATIVE.

## 2021-05-28 ENCOUNTER — RECORDS - HEALTHEAST (OUTPATIENT)
Dept: ADMINISTRATIVE | Facility: CLINIC | Age: 71
End: 2021-05-28

## 2021-05-28 ENCOUNTER — HOSPITAL ENCOUNTER (OUTPATIENT)
Dept: PHYSICAL MEDICINE AND REHAB | Facility: CLINIC | Age: 71
Discharge: HOME | End: 2021-05-28
Attending: PHYSICAL MEDICINE & REHABILITATION
Payer: COMMERCIAL

## 2021-05-28 DIAGNOSIS — G89.29 CHRONIC PAIN OF RIGHT KNEE: ICD-10-CM

## 2021-05-28 DIAGNOSIS — M25.561 CHRONIC PAIN OF RIGHT KNEE: ICD-10-CM

## 2021-05-28 DIAGNOSIS — M99.05 SOMATIC DYSFUNCTION OF PELVIS REGION: ICD-10-CM

## 2021-05-28 DIAGNOSIS — M99.04 SACRAL REGION SOMATIC DYSFUNCTION: ICD-10-CM

## 2021-05-28 DIAGNOSIS — G89.29 CHRONIC MIDLINE LOW BACK PAIN WITHOUT SCIATICA: ICD-10-CM

## 2021-05-28 DIAGNOSIS — M99.03 LUMBAR REGION SOMATIC DYSFUNCTION: ICD-10-CM

## 2021-05-28 DIAGNOSIS — M99.02 THORACIC REGION SOMATIC DYSFUNCTION: ICD-10-CM

## 2021-05-28 DIAGNOSIS — M99.08 RIB CAGE REGION SOMATIC DYSFUNCTION: ICD-10-CM

## 2021-05-28 DIAGNOSIS — M54.50 CHRONIC MIDLINE LOW BACK PAIN WITHOUT SCIATICA: ICD-10-CM

## 2021-05-28 DIAGNOSIS — M99.01 CERVICAL SOMATIC DYSFUNCTION: ICD-10-CM

## 2021-05-28 DIAGNOSIS — M17.11 PRIMARY OSTEOARTHRITIS OF RIGHT KNEE: ICD-10-CM

## 2021-05-28 DIAGNOSIS — M99.00 HEAD REGION SOMATIC DYSFUNCTION: ICD-10-CM

## 2021-05-28 ASSESSMENT — MIFFLIN-ST. JEOR: SCORE: 1408.54

## 2021-05-28 NOTE — PROGRESS NOTES
Assessment:   Holly Wood (AKKALPANA Serrato) is a 69 y.o. y.o. female with past medical history significant for macular degeneration who presents today for follow-up regarding left hip pain thought to be from primary osteoarthritis of the left hip.  The patient has had approximately 95% relief following a left intra-articular hip joint injection.  Patient is now wanting to focus on her neck left-sided low back pain without radicular/sciatic type leg symptoms.  This pain is thought to be from left sacroiliac joint pain in etiology as she does have positive SI provocative maneuvers on exam today.  Patient is also complaining of chronic bilateral knee pain, left greater than right, that has been present for approximately 15 years.  This is likely secondary to osteoarthritis of the knees.  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  Patient is neurologically intact without any red flag symptoms.       Plan:     A shared decision making plan was used.  The patient's values and choices were respected.  The following represents what was discussed and decided upon by the physician and the patient.      1.  DIAGNOSTIC TESTS:    -The patient has not had any x-rays of the knees.  X-rays of the bilateral knees were ordered today for the patient.  -The patient's x-rays of the pelvis were personally reviewed today by the physician with the physician performing her own interpretation.  Patient does not have any erosive changes of the SI joints.  2.  PHYSICAL THERAPY: The patient has not had physical therapy for the knees or for the hips.  An order for physical therapy to target strengthening of the hips and the knee stabilizer muscles was ordered today.  -After the patient occupational therapy for her CMC arthritis and hand pain.  Patient wanted to go so an order was provided.  They could also provide any recommendations for splint/hand  orthotics.  3.  MEDICATIONS: No changes to her medications today.  The patient can continue to take ibuprofen and aspirin as needed as well as  gabapentin at bedtime.  4.  INTERVENTIONS:    -The patient would like to proceed with a left sacroiliac joint injection.  This is ordered today and can be scheduled out 1 week from today.  -The patient is also interested in a left intra-articular knee joint injection.  Will consider this after the x-rays of her knee are reviewed.  -Explained to the patient that her total amount of steroid needs to be considered.  She wants to have injections into multiple body sites, caution will need to be used to ensure that she does not receive too much steroid in all of the areas receiving injections.  Explained that physical therapy will be very important to try and help the injections last as long as possible and to decrease her overall number of injections per year.  She verbalized understanding.  5.  PATIENT EDUCATION:    -All of her questions were answered to her satisfaction today.  She was in agreement with the treatment plan.  6.  FOLLOW-UP: She will follow-up in approximately 1 week for the left sacroiliac joint injection.  If she has any questions, concerns, or any significant worsening of her pain prior to that time she is encouraged to contact the clinic.  She is welcome to schedule an OMM appointment at her convenience.  She should follow-up approximately 2 weeks after the left SI joint injection.  Consideration for knee injection can be given at that time.    Subjective:     Holly ISAAC Chiraghima (AKA Ama) is a 69 y.o. female who presents today for follow-up regarding left hip pain.  The patient is status post a left intra-articular hip joint injection.  The patient reports that she had significant relief following this injection.  She is getting 95% relief.  She is very interested in other injections to try and help with her other areas of pain.  She would like to focus on  the left low back pain.  Is just in the left low back and she points to the left SI joint.  She denies any radiation of pain going down into the left leg.  No numbness tingling or weakness in the left leg.  She also has significant bilateral knee pain, significantly worse in the left knee compared to the right.  Left knee hurts on the lateral aspect of the joint line.  Her knees have hurt for 15 years.  Patient is also complaining of pain in the bilateral hands at the CMC joints bilaterally.  She is interested in cortisone injections for those joints.  She has not had any conservative treatment for any of these areas of pain, including no physical therapy for the hip.  At this time she is stating that her neck pain is under good control.  Overall she rates her pain at a 0 out of 10.  At worst it is a 2 out of 10.  At best it is a 0 out of 10.  Her pain is worse with standing one position for too long (20 minutes).  If she moves into a wrong position, this will she reports that she feels better with certain movements as long as she is using correct biomechanics.  Twisting can sometimes help the pain feel better.    Past medical history is reviewed and is unchanged for any new medical diagnoses in the interim.      Family history is reviewed and is unchanged in the interim.      Review of Systems:  Positive for increased pain at the end of the day before bedtime.  Positive for difficulty with hand skills secondary to arthritis of the CMC joint.  Negative for any fevers or chills, numbness or tingling, weakness, unintentional weight loss, headaches.     Objective:   CONSTITUTIONAL:  Vital signs as above.  No acute distress.  The patient is well nourished and well groomed.    PSYCHIATRIC:  The patient is awake, alert, oriented to person, place and time.  The patient is answering questions appropriately with clear speech.  Normal affect.  SKIN:  Skin over the face, posterior torso, bilateral upper and lower extremities  is clean, dry, intact without rashes.  MUSCULOSKELETAL:  Gait is non-antalgic.  Patient can transfer independently with ease.  Positive Rafaela finger test on the left side.  Patient points to the left SI joint with her index finger.  The patient has significant tenderness over the left SI joint to palpation.  The patient has positive Fabere's test on the left side.  Positive Yeomans test on the left side.  Positive Gaenslen's test on the left side.  For each of these 3 tests, pain localizes to the left SI joint.  Patient does have tenderness over the lateral joint line of the left knee.  There is no erythema or edema of the left knee.  Negative anterior/posterior drawer test of the left knee.  Negative varus/valgus stress test of the left knee.  Negative patellar ballottement test.  No pain with manipulation of the left patella.

## 2021-05-28 NOTE — PATIENT INSTRUCTIONS - HE
An order for physical therapy and occupational therapy has been provided today.  You can schedule physical and occupational therapy before you leave today or someone will call you to schedule physical and occupational therapy.  It will be very important for you to do your physical therapy exercises on a regular basis to decrease your pain and prevent future flares of pain.    Xrays of your knees are ordered today.  Dr. Gao will review these with you at your follow-up appointment after your sacroiliac joint injection.    A left sacroiliac joint injection has been ordered today.  On the day of your injection, you cannot be sick or taking antibiotics.  If you become sick and are prescribed, please call the clinic so your injection can be rescheduled for once you have completed your antibiotics.  You will need to bring a  with you for your injection.   Please do not hesitate to contact the clinic at [phone] if you have any questions/concerns or any worsening of your pain prior to that time.  You are also welcome to contact Dr. Gao via [facility].      You can follow-up with Dr. Gao at any time for further osteopathic manipulation.

## 2021-05-29 NOTE — PROGRESS NOTES
[facility] Discharge Summary  Patient Name: Holly Wood  Date: 9/17/2019  Referral Diagnosis: thumb pain  Referring provider: Laurel Beasley MD  Visit Diagnosis:   1. Bilateral thumb pain     2. Weakness of both hands     3. Decreased activities of daily living (ADL)     4. Impaired instrumental activities of daily living       Goal Status: goals met  Patient Will Demonstrate / Verbalize independence in self-management of condition in: 4 weeks  Patient will be independent with home exercise program in: 4 weeks  Patient will be able to: lift;carry;for housework;with less pain;in 12 weeks  Patient will be able to  & pinch: for dressing;for grooming;with less pain;in 12 weeks  Patient will improve hand/finger coordination for: fasteners;typing;with less pain;in 12 weeks    Patient was seen for 3 visits between 6-5-19 and 6-19-19.    Therapy will be discontinued at this time.  The patient will need a new referral to resume.    Thank you for your referral.  Sandra A Michael  9/17/2019   4:40 PM    [facility] Daily Progress     Patient Name: Hloly Wood  Date: 6/19/2019  Visit #: 2  Referral Diagnosis: thumb pain  Referring provider: Luarel Beasley MD  Visit Diagnosis:     ICD-10-CM    1. Bilateral thumb pain M79.644     M79.645    2. Weakness of both hands R29.898    3. Decreased activities of daily living (ADL) R68.89    4. Impaired instrumental activities of daily living R53.81        Assessment:     Patient demonstrates understanding/independence with home program.    Goal Status: goals met  Patient Will Demonstrate / Verbalize independence in self-management of condition in: 4 weeks  Patient will be independent with home exercise program in: 4 weeks  Patient will be able to: lift;carry;for housework;with less pain;in 12 weeks  Patient will be able to  & pinch: for dressing;for grooming;with less pain;in 12 weeks  Patient will improve hand/finger coordination  for: fasteners;typing;with less pain;in 12 weeks      Plan / Patient Education:     Continue with initial plan of care.  Progress with home program as tolerated.    Subjective:     Pain rating at rest: 0  Pain rating with activity: 0      Objective:     Plan for next visit: to be determined    Treatment Today: Left thumb spica orthosis for night wearing is helping significantly. Patient instructed on isometric thumb strengthening. Applied and instructed on use and benefits of kinesiotape (from right thumb to elbow).   TREATMENT MINUTES COMMENTS   Evaluation     Self-care/ Home management     Manual therapy     Neuromuscular Re-education 8    Therapeutic Exercises 15    Iontophoresis     Orthotic Fitting     Total 23    Blank areas are intentional and mean the treatment did not include these items.       Sandra Rodriguez  6/19/2019  11:30 AM

## 2021-05-29 NOTE — PROGRESS NOTES
[facility] Daily Progress     Patient Name: Holly Wood  Date: 6/12/2019  Visit #: 2/4-6 visits   PTA visit #:    Referral Diagnosis:   M25.552 (ICD-10-CM) - Hip pain, left   M16.12 (ICD-10-CM) - Primary osteoarthritis of left hip   M25.562, G89.29 (ICD-10-CM) - Chronic pain of left knee   M25.561, G89.29 (ICD-10-CM) - Chronic pain of right knee   M17.12 (ICD-10-CM) - Primary osteoarthritis of left knee   Referring provider: Laurel Beasley MD  Visit Diagnosis:     ICD-10-CM    1. Chronic midline low back pain without sciatica M54.5     G89.29    2. Chronic hip pain, left M25.552     G89.29    3. Generalized muscle weakness M62.81          Assessment:     HEP/POC compliance is  good .  Patient is benefitting from skilled physical therapy and is making steady progress toward functional goals.  Patient is appropriate to continue with skilled physical therapy intervention, as indicated by initial plan of care.    Goal Status:  Pt. will demonstrate/verbalize independence in self-management of condition in : 2 weeks  Pt. will be independent with home exercise program in : 2 weeks    Pt will: walk > 10 minutes without stopping due to pain to improve walking tolerance in 8 weeks   Pt will: ascend/descend stairs with 1 HR with pain <3/10 on NPRS to improve stair climbing in 8 weeks  Pt will: sit > 10' wihtout increased pain to improve sitting tolerance in 8 weeks       Plan / Patient Education:     Continue with initial plan of care.  Progress with home program as tolerated.    Subjective:   Patient reports feeling some improvement since last visit - feels her posture is improving. Noticed relief after last session lasting approximately 1 day.      Pain Rating: 3/10 pain today currently in lower back/hip     Objective:     Non antalgic gait post treatment     Treatment Today     TREATMENT MINUTES COMMENTS   Evaluation     Self-care/ Home management     Manual therapy 15 Posterior glide L hip with LE  "on therapist shoulder, grade II with 10\" holds.    L LE long axis distraction in open pack of hip grade II x 30\" holds     Decreased pain reported post treatment.    Neuromuscular Re-education     Therapeutic Activity     Therapeutic Exercises 15 Exercises progressed:  Exercise #1: Piriformis  Comment #1: x 30 seconds x 2-3 reps - already performing   Exercise #2: TA with slow alternating march   Comment #2: x 5 seconds x 15-20 reps   Exercise #3: Bridge   Comment #3: x 5 seconds x 15-20 reps   Exercise #4: Sidelying SLR with TA  Comment #4: x 5-10 reps B        Gait training     Modality__________________                Total 30    Blank areas are intentional and mean the treatment did not include these items.       Renata Rao  6/12/2019    "

## 2021-05-29 NOTE — PROGRESS NOTES
Assessment:   Holly Wood (AKKALPANA Serrato)  is a 69 y.o. y.o. female with past medical history significant for macular degeneration who presents today for follow-up regarding chronic left-sided low back pain without radicular/sciatic type leg symptoms thought to be from left sacroiliac joint pain in etiology.  The patient is status post a left sacroiliac joint injection on June 4 of 2019.  At this time she is reporting approximately 65% relief following this injection.  She continues to have mild neck pain.  She has multiple areas of osteopathic somatic dysfunction as listed below.  She is without any red flag symptoms.         Plan:     A shared decision making plan was used.  The patient's values and choices were respected.  The following represents what was discussed and decided upon by the physician and the patient.      1.  DIAGNOSTIC TESTS:  No further diagnostic tests are necessary at this time.    2.  PHYSICAL THERAPY: The patient can complete physical therapy as previously ordered.  She is encouraged to discuss the sharp pain that occurred after the maneuver that the physical therapist did.  The physical therapist can either modify that maneuver or eliminate it completely from her next treatment.  3.  MEDICATIONS:  No changes to the medications today.    4.  INTERVENTIONS:  Osteopathic manipulation was performed to 7 areas today.  The patient tolerated the treatment well and she reported some soreness in the left SI joint afterwards.   Please see below for the osteopathic manipulation that was performed today.  5.  PATIENT EDUCATION:    - The patient was advised to rest today and drink plenty of water.  Normal activities can be resumed as tolerated tomorrow.    - The patient was told that soreness following the treatment is normal and should improve within a few days.  If the soreness is concerning, the clinic should be notified so further instructions can be given.  6.  FOLLOW-UP: The patient is asked to  follow-up in approximately 6 weeks.  If she has any questions, concerns, or any significant worsening of her pain prior to that time she is encouraged to contact the clinic.    Subjective:     Holly Wood (AKA Ama)  is a 69 y.o. female who presents today for follow-up regarding chronic left-sided low back pain without radicular/sciatic type leg symptoms.  Patient is status post a left sacroiliac joint injection on June 4 of 2019.  The patient is reporting approximately 65% relief of her pain following this injection.  She continues to have some mild pain in that area.  But overall she reports that she is doing quite well.  Her neck remains under good control.  She has having improvement with physical therapy.  She rates her pain today at a 0 out of 10.  At worst it is a 6 out of 10.  At best it is a 0 out of 10.  Pain is worse with laying on her back, yard work, walking.  Her pain is better with the physical therapy exercises and with taking ibuprofen or aspirin.  She denies any new symptoms.  She has been pleased with physical therapy overall.  She plans to complete physical therapy as ordered.  She only has one concern today and that is she experienced a very sharp pain like an electric shock in her groin area.  This seemed to coincide with the maneuver that the physical therapist did.  She reports that the pain lasted for only a second, but it was very severe when it did occur.  She is only had 2 episodes.    Past medical history is reviewed and is unchanged in the interim.    Family history is reviewed and is unchanged in the interim.    Review of Systems:  Negative for numbness/tingling, weakness, bowel/bladder dysfunction, foot drop, headache, dizziness, nausea/vomiting, blurred vision, balance changes.     Objective:   CONSTITUTIONAL:  Vital signs as above.  No acute distress.  The patient is well nourished and well groomed.    PSYCHIATRIC:  The patient is awake, alert, oriented to person, place and  time.  The patient is answering questions appropriately with clear speech.  Normal affect.  SKIN:  Skin over the face, posterior torso, bilateral upper and lower extremities is clean, dry, intact without rashes.  MUSCULOSKELETAL:  Gait is non-antalgic.  The patient is able to transfer independently.      OSTEOPATHIC STRUCTURAL EXAM:  Positive standing flexion test on the right.  Right inferior iliac crest.  Lumbar spine: L4 flexed, rotated/side bent left  Sacrum: Right unilateral sacral flexion  Innominates/Pelvis: Anterior/inferior right, right down slipped innominate  Thoracic spine: T2-4 flexed, rotated/side bent right  Rib cage: First rib elevated on the left.  Cervical spine: C2 side bent right/rotated right  Head/OA joint: Side bent right/rotated left    OMT:  TREATMENTS IN PARENTHESES  Lumbar spine: L4 flexed, rotated/side bent left (Myofascial release, patient prone).  Sacrum: Right unilateral sacral flexion (Myofascial release, patient prone).  Innominates/Pelvis: Anterior/inferior right  (Muscle energy with the patient supine)., right down slipped innominate (myofascial release, patient prone).  Thoracic spine: T2-4 flexed, rotated/side bent right (Muscle energy, patient seated)  Rib cage: First rib elevated on the left.  (Myofascial release, patient supine)  Cervical spine: C2 side bent right/rotated right  (Muscle energy with the patient supine).  Head/OA joint: Side bent right/rotated left  (Muscle energy with the patient supine).

## 2021-05-29 NOTE — PATIENT INSTRUCTIONS - HE
Please rest today and drink plenty of water.      It is normal to have soreness following the treatment.  Please use ice over the sore areas.  You may take your usual pain medications.  Please call the clinic at [phone] or contact Dr. Gao via [facility] if the soreness is concerning to you.      Please follow-up in 6 weeks.

## 2021-05-29 NOTE — PROGRESS NOTES
"[facility] Daily Progress     Patient Name: Holly Wood  Date: 6/19/2019  Visit #: 3/4-6 visits   PTA visit #:    Referral Diagnosis:   M25.552 (ICD-10-CM) - Hip pain, left   M16.12 (ICD-10-CM) - Primary osteoarthritis of left hip   M25.562, G89.29 (ICD-10-CM) - Chronic pain of left knee   M25.561, G89.29 (ICD-10-CM) - Chronic pain of right knee   M17.12 (ICD-10-CM) - Primary osteoarthritis of left knee   Referring provider: Laurel Beasley MD  Visit Diagnosis:     ICD-10-CM    1. Chronic midline low back pain without sciatica M54.5     G89.29    2. Chronic hip pain, left M25.552     G89.29    3. Generalized muscle weakness M62.81          Assessment:   Patient reports improving pain and appears overall happy with her progress.     HEP/POC compliance is  good .  Patient is benefitting from skilled physical therapy and is making steady progress toward functional goals.  Patient is appropriate to continue with skilled physical therapy intervention, as indicated by initial plan of care.    Goal Status:  Pt. will demonstrate/verbalize independence in self-management of condition in : 2 weeks  Pt. will be independent with home exercise program in : 2 weeks    Pt will: walk > 10 minutes without stopping due to pain to improve walking tolerance in 8 weeks   Pt will: ascend/descend stairs with 1 HR with pain <3/10 on NPRS to improve stair climbing in 8 weeks  Pt will: sit > 10' wihtout increased pain to improve sitting tolerance in 8 weeks       Plan / Patient Education:     Continue with initial plan of care.  Progress with home program as tolerated.    Subjective:   Patient reports her pain is improving overall, \"very happy with exercises\".      Pain Rating: 3/10 pain today currently in lower back/hip     Objective:   Lumbar AROM:   Flx: no increased pain   Ext: slight pain   B rot: no increased pain     Treatment Today     TREATMENT MINUTES COMMENTS   Evaluation     Self-care/ Home management   "   Manual therapy     Neuromuscular Re-education     Therapeutic Activity     Therapeutic Exercises 28 Exercises progressed:  Exercise #1: Piriformis  Comment #1: x 30 seconds x 2-3 reps - already performing   Exercise #2: TA with slow alternating march   Comment #2: x 5 seconds x 15-20 reps   Exercise #3: Bridge   Comment #3: x 5 seconds x 15-20 reps   Exercise #4: Sidelying SLR with TA  Comment #4: x 5-10 reps B   Exercise #5: Supine SLR with ab set  Exercise #6: x 10 reps B with ab/quad set  Exercise #7: Hooklying hip abduction  Comment #7: L2 band 2 x 10 with TA set        Gait training     Modality__________________                Total 28    Blank areas are intentional and mean the treatment did not include these items.       Renata Rao  6/19/2019

## 2021-05-29 NOTE — PROGRESS NOTES
[facility] Daily Progress     Patient Name: Holly Wood  Date: 6/12/2019  Visit #: 2  Referral Diagnosis: thumb pain  Referring provider: Laurel Beasley MD  Visit Diagnosis:     ICD-10-CM    1. Bilateral thumb pain M79.644     M79.645    2. Weakness of both hands R29.898    3. Decreased activities of daily living (ADL) R68.89    4. Impaired instrumental activities of daily living R53.81        Assessment:     Patient is appropriate to continue with skilled occupational therapy intervention, as indicated by initial plan of care.    Goal Status:  Patient Will Demonstrate / Verbalize independence in self-management of condition in: 4 weeks  Patient will be independent with home exercise program in: 4 weeks  Patient will be able to: lift;carry;for housework;with less pain;in 12 weeks  Patient will be able to  & pinch: for dressing;for grooming;with less pain;in 12 weeks  Patient will improve hand/finger coordination for: fasteners;typing;with less pain;in 12 weeks      Plan / Patient Education:     Continue with initial plan of care.  Progress with home program as tolerated.    Subjective:     Pain rating at rest: 0  Pain rating with activity: 10      Objective:     Plan for next visit:  isometric thumb strengthening, kinesiotape, STM, fabricate right thumb spica orthosis if left is helpful    Treatment Today: Fabricated left thumb spica orthosis for night wearing and as needed during the day.   TREATMENT MINUTES COMMENTS   Evaluation     Self-care/ Home management     Manual therapy     Neuromuscular Re-education     Therapeutic Exercises     Iontophoresis     Orthotic Fitting 25    Total 25    Blank areas are intentional and mean the treatment did not include these items.       Sandra Rodriguez  6/12/2019  9:00 AM

## 2021-05-29 NOTE — PROGRESS NOTES
[facility] Certification Request    June 5, 2019      Patient: Holly Wood  MR Number: [id number]  YOB: 1950  Date of Visit: 6/5/2019      Dear Dr. Murray Young,     Thank you for this referral.   We are seeing Holly Wood for Physical Therapy for hip and knee pain .    Medicare and/or Medicaid requires physician review and approval of the treatment plan. Please review the plan of care and verify that you agree with the therapy plan of care by co-signing this note.      Plan of Care  Authorization / Certification Start Date: 06/05/19  Authorization / Certification End Date: 08/04/19  Authorization / Certification Number of Visits: up to 6 visits   Communication with: Referral Source  Patient Related Instruction: Nature of Condition;Treatment plan and rationale;Self Care instruction;Basis of treatment;Body mechanics;Posture  Times per Week: 1-2x/week  Number of Weeks: up to 60 days  Number of Visits: up to 6 visits   Precautions / Restrictions : to I HEP  Therapeutic Exercise: ROM;Stretching;Strengthening  Neuromuscular Reeducation: kinesio tape;posture;balance/proprioception;TNE;postural restoration  Manual Therapy: soft tissue mobilization;myofascial release;joint mobilization;muscle energy  Other Plan #1: therapeutic activity       Goals:  Pt. will demonstrate/verbalize independence in self-management of condition in : 2 weeks  Pt. will be independent with home exercise program in : 2 weeks    Pt will: walk > 10 minutes without stopping due to pain to improve walking tolerance in 8 weeks   Pt will: ascend/descend stairs with 1 HR with pain <3/10 on NPRS to improve stair climbing in 8 weeks  Pt will: sit > 10' wihtout increased pain to improve sitting tolerance in 8 weeks         If you have any questions or concerns, please don't hesitate to call.    Sincerely,      Renata Rao, PT        Physician recommendation:     __x_ Follow therapist's recommendation        ___  Modify therapy      *Physician co-signature indicates they certify the need for these services furnished within this plan and while under their care.      [facility]   Knee Initial Evaluation    Patient Name: Holly Wood  Date of evaluation: 6/5/2019  Referral Diagnosis:   M25.552 (ICD-10-CM) - Hip pain, left   M16.12 (ICD-10-CM) - Primary osteoarthritis of left hip   M25.562, G89.29 (ICD-10-CM) - Chronic pain of left knee   M25.561, G89.29 (ICD-10-CM) - Chronic pain of right knee   M17.12 (ICD-10-CM) - Primary osteoarthritis of left knee   Referring provider: Iram Vasquez*  Visit Diagnosis:     ICD-10-CM    1. Chronic midline low back pain without sciatica M54.5     G89.29    2. Chronic hip pain, left M25.552     G89.29    3. Generalized muscle weakness M62.81        Assessment:   Holly Wood is a 69 y.o. female who presents to therapy today with chief complaints of chronic hip, knee and SI joint pain without specific injury. Pain is located in L knee, diffuse, and anterior/posterior left hip and SI. Sx are aggravated by sitting for long periods, standing, walking. Evaluation today reveals: + hip provocation testing, limited lumbar ROM, weakness. S/s appear consistent with chronic hip/knee pain likely due to OA. Patient will benefit from 1:1 skilled physical therapy services to address the above limitations.     Goals:  Pt. will demonstrate/verbalize independence in self-management of condition in : 2 weeks  Pt. will be independent with home exercise program in : 2 weeks    Pt will: walk > 10 minutes without stopping due to pain to improve walking tolerance in 8 weeks   Pt will: ascend/descend stairs with 1 HR with pain <3/10 on NPRS to improve stair climbing in 8 weeks  Pt will: sit > 10' wihtout increased pain to improve sitting tolerance in 8 weeks       Patient's expectations/goals are realistic.    Barriers to Learning or Achieving Goals:  No Barriers.       Plan / Patient  Instructions:      Plan of Care:   Authorization / Certification Start Date: 06/05/19  Authorization / Certification End Date: 08/04/19  Authorization / Certification Number of Visits: up to 6 visits   Communication with: Referral Source  Patient Related Instruction: Nature of Condition;Treatment plan and rationale;Self Care instruction;Basis of treatment;Body mechanics;Posture  Times per Week: 1-2x/week  Number of Weeks: up to 60 days  Number of Visits: up to 6 visits   Precautions / Restrictions : to I HEP  Therapeutic Exercise: ROM;Stretching;Strengthening  Neuromuscular Reeducation: kinesio tape;posture;balance/proprioception;TNE;postural restoration  Manual Therapy: soft tissue mobilization;myofascial release;joint mobilization;muscle energy  Other Plan #1: therapeutic activity       Plan for next visit: progress HEP - hip/core stabilization      Subjective:       Holly Wood is a 70 y/o female who presents today with chief c/o chronic hip, knee and SI joint pain without specific injury. Pain has been worsening since 3/2019. Pt received hip injection in L trochanter 3/2019 which did improve pain significantly. She recently had an SI joint injection which improved pain slightly. Pain in L SI and hip is approximately 2/10 currently. Pain is aggravated by sitting, walking (unable to walk >5-10 minutes), stair climbing, lying on her left side. No pain with biking.     Patient goals: improve pain with stairs (knee pain), improve pain with sitting tolerance, improve floor transfer       EXAM DATE:         05/07/2019     EXAM: X-RAY KNEES BILATERAL, 3 VIEWS  LOCATION: [facility]  DATE/TIME: 5/7/2019 2:50 PM     INDICATION: Left knee pain  COMPARISON: None.     FINDINGS:  Minimal degenerative narrowing of the lateral compartment of both knees. No  evidence for fracture or significant effusion. Mild change within the  patellofemoral compartment of both knees, again more marked  laterally.       Social information:   Living Situation:single family home   Occupation:retired    Pain Ratin  Pain rating at best: 3  Pain rating at worst: 9  Pain description:aching and sharp       Objective:      Note: Items left blank indicates the item was not performed or not indicated at the time of the evaluation.      Knee Examination  1. Chronic midline low back pain without sciatica     2. Chronic hip pain, left     3. Generalized muscle weakness       Precautions/Restrictions:  None  Involved Side: Left    Posture Observation:   Standing observation:     Gait Observation:     Hip assessment:     L hip: pain at end range flx, IR and ER, IR>ER pain  SLR to appprox 80 degrees with -ve cross over   + JOSEPH    R hip: flx WNL no pain, IR/ER without increased pain  Slight pain with JOSEPH        Lumbar flx: to patella with increased posterior LE pain   Ext: full pain across sacrum  R rot: 25% loss increased sacral pain   L rot: 25% loss increased sacral pain     Knee ROM:   Date:       Knee ROM ( ) AROM in degrees AROM in degrees AROM in degrees    Right Left Right Left Right Left   Knee Flexion (0-130 ) 126 129       Knee extension (0 ) 2 from 0 2 from 0        PROM in degrees PROM in degrees PROM in degrees    Right Left Right Left Right Left   Knee Flexion (0-130 )         Knee extension (0 )            Hip/Knee Strength     Date:      Hip/Knee Strength (/5) MMT MMT MMT    Right Left Right Left Right Left   Hip Flexion 4 4       Hip Abduction         Hip Adduction         Hip Extension 4- 4-       Hip External Rotation         Hip Internal Rotation         Knee Extension 4- 4-       Knee Flexion           Patellar assessment:          Palpation:   + lateral hip pain  + joint line pain on L         Knee Special Tests:     Meniscal Tests Right (+/-) Left (+/-) Ligament Tests Right (+/-) Left (+/-)   Karrie s   Lachman     Joint Line Tenderness   Anterior Drawer     Thessaly   Posterior Drawer     Apley s    "Posterior sag     Knee OA Right (+/-) Left (+/-) Valgus Stress     1. > 51 y/o  2. Stiffness > 30 minutes  3. Crepitus   4. Bony tenderness  5. Bone enlargement  6. No warmth to the touch   Varus Stress     Other Right (+/-) Left (+/-) Other     Ely s   Other     Eusebia                                                    Treatment Today    TREATMENT MINUTES COMMENTS   Evaluation 30 Hip/knee eval   Self-care/ Home management     Manual therapy 10 Posterior glide L hip with LE on therapist shoulder, grade II with 10\" holds.  Decreased pain reported post treatment.      Neuromuscular Re-education     Therapeutic Activity     Therapeutic Exercises 15 Exercises initiated today:  Exercise #1: Piriformis  Comment #1: x 30 seconds x 2-3 reps - already performing   Exercise #2: TA  Comment #2: x 5 seconds x 15-20 reps   Exercise #3: Bridge   Comment #3: x 5 seconds x 15-20 reps        Gait training     Modality__________________                Total 55    Blank areas are intentional and mean the treatment did not include these items.           PT Evaluation Code: (Please list factors)  Patient History/Comorbidities: macular degneration  Examination: see above  Clinical Presentation: stable  Clinical Decision Making: low    Patient History/  Comorbidities Examination  (body structures and functions, activity limitations, and/or participation restrictions) Clinical Presentation Clinical Decision Making (Complexity)   No documented Comorbidities or personal factors 1-2 Elements Stable and/or uncomplicated Low   1-2 documented comorbidities or personal factor 3 Elements Evolving clinical presentation with changing characteristics Moderate   3-4 documented comorbidities or personal factors 4 or more Unstable and unpredictable High           Renata Rao  6/5/2019  10:34 AM                 "

## 2021-05-29 NOTE — PATIENT INSTRUCTIONS - HE
Follow-up visit with Dr. Gao in 2 weeks to discuss injection outcome and determine care plan going forward.       DISCHARGE INSTRUCTIONS    During office hours (8:00 a.m.- 4:30 p.m.) questions or concerns may be answered  by calling Spine Navigation Nurses at  [phone].     If you experience any problems after hours  please call [phone] and you will be connected to [facility].     All Patients:    ? You may experience an increase in your symptoms for the first 2 days (It may take anywhere between 2 days- 2 weeks for the steroid to have maximum effect).    ? You may use ice on the injection site, as frequently as 20 minutes each hour if needed.    ? You may take your pain medicine.    ? You may continue taking your regular medication after your injection. If you have had a Medial Branch Block you may resume pain medication once your pain diary is completed.    ? You may shower. No swimming, tub bath or hot tub for 48 hours.  You may remove your bandaid/bandage as soon as you are home.    ? You may resume light activities, as tolerated.    ? Resume your usual diet as tolerated.    ? It is strongly advised that you do not drive for 1-3 hours post injection.    ? If you have had oral sedation:  Do not drive for 8 hours post injection.      ? If you have had IV sedation:  Do not drive for 24 hours post injection.  Do not operate hazardous machinery or make important personal/business decisions for 24 hours.      POSSIBLE STEROID SIDE EFFECTS (If steroid/cortisone was used for your procedure)    -If you experience these symptoms, it should only last for a short period      Swelling of the legs                Skin redness (flushing)       Mouth (oral) irritation     Blood sugar (glucose) levels              Sweats                      Mood changes    Headache    Sleeplessness         POSSIBLE PROCEDURE SIDE EFFECTS  -Call the Spine Center if you are concerned    Increased Pain              Increased numbness/tingling        Nausea/Vomiting            Bruising/bleeding at site        Redness or swelling                                                Difficulty walking        Weakness             Fever greater than 100.5    *In the event of a severe headache after an epidural steroid injection that is relieved by lying down, please call the [facility] to speak with a clinical staff member*

## 2021-05-29 NOTE — PROGRESS NOTES
[facility] Certification Request    June 5, 2019      Patient: Holly Wood  MR Number: [id number]  YOB: 1950  Date of Visit: 6/5/2019      Dear Dr. Sylvia Young:    Thank you for this referral.   We are seeing Holly Wood in Occupational Therapy for thumb pain.    Medicare and/or Medicaid requires physician review and approval of the treatment plan. Please review the plan of care and verify that you agree with the therapy plan of care by co-signing this note.      Plan of Care  Authorization / Certification Start Date: 06/05/19  Authorization / Certification End Date: 09/03/19  Authorization / Certification Number of Visits: 12  Communication with: Referral Source  Patient Related Instruction: Nature of Condition;Treatment plan and rationale;Basis of treatment;Expected outcome  Times per Week: 1  Number of Weeks: 12  Number of Visits: 12  Therapeutic Exercise: ROM;Stretching;Strengthening  Neuromuscular Reeducation: kinesio tape  Manual Therapy: soft tissue mobilization  Functional Training (ADL's): compensatory training;ergonomics;ADL's  Orthotic Fitting: custom      Goals:  Patient Will Demonstrate / Verbalize independence in self-management of condition in: 4 weeks  Patient will be independent with home exercise program in: 4 weeks  Patient will be able to: lift;carry;for housework;with less pain;in 12 weeks  Patient will be able to  & pinch: for dressing;for grooming;with less pain;in 12 weeks  Patient will improve hand/finger coordination for: fasteners;typing;with less pain;in 12 weeks        If you have any questions or concerns, please don't hesitate to call.    Sincerely,      Sandra Rodriguez, OT        Physician recommendation:                                ___ Follow therapist's recommendation                                                                                                    __x_ Modify therapy       (up to 6 sessions to start).                                                                                                Physician Signature:________JKT_____________                                                                                                                                        Date:____0-2-23_______________________      *Physician co-signature indicates they certify the need for these services furnished within this plan and while under their care.      [facility]   Upper Extremity Initial Evaluation    Patient Name: Holly Wood  Date of evaluation: 6/5/2019  Referral Diagnosis: bilateral thumb pain  Referring provider: Iram Vasquez*  Visit Diagnosis:     ICD-10-CM    1. Bilateral thumb pain M79.644     M79.645    2. Weakness of both hands R29.898    3. Decreased activities of daily living (ADL) R68.89    4. Impaired instrumental activities of daily living R53.81        Assessment:      Pt. is appropriate for skilled OT intervention as outlined in the Plan of Care (POC).    Goals:  Patient Will Demonstrate / Verbalize independence in self-management of condition in: 4 weeks  Patient will be independent with home exercise program in: 4 weeks  Patient will be able to: lift;carry;for housework;with less pain;in 12 weeks  Patient will be able to  & pinch: for dressing;for grooming;with less pain;in 12 weeks  Patient will improve hand/finger coordination for: fasteners;typing;with less pain;in 12 weeks      Patient's expectations/goals are realistic.    Barriers to Learning or Achieving Goals:  No Barriers.       Plan / Patient Instructions:        Plan of Care:   Authorization / Certification Start Date: 06/05/19  Authorization / Certification End Date: 09/03/19  Authorization / Certification Number of Visits: 12  Communication with: Referral Source  Patient Related Instruction: Nature of Condition;Treatment plan and rationale;Basis of treatment;Expected outcome  Times per Week: 1  Number of  Weeks: 12  Number of Visits: 12  Therapeutic Exercise: ROM;Stretching;Strengthening  Neuromuscular Reeducation: kinesio tape  Manual Therapy: soft tissue mobilization  Functional Training (ADL's): compensatory training;ergonomics;ADL's  Orthotic Fitting: custom         Subjective:        Social information:   Occupation:retired   Work Status:NA     History of Present Illness:    Holly is a 69 y.o. female who presents to therapy today with complaints of bilateral thumb pain and weakness. Date of onset/duration of symptoms is about 2 years ago with worsening in June 2018. Onset was gradual. Symptoms are getting worse.  She describes their previous level of function as similar but less limited. Functional limitations are described as occurring with gripping, pinching, lifting, carrying for ADL's and IADL's.     Pain rating at rest: 1  Pain rating with activity: 10         Objective:      Note: Items left blank indicates the item was not performed or not indicated at the time of the evaluation.    Patient Outcome Measures :    QuickDASH Score: 47.7      Upper Extremity Examination:  1. Bilateral thumb pain     2. Weakness of both hands     3. Decreased activities of daily living (ADL)     4. Impaired instrumental activities of daily living       Precautions/Restrictions: None  Involved side: Bilateral  Atrophy:  Present  Color: Normal  Temperature: Normal  Edema: Absent  Palpation: bilateral thumb joints  Scar: NA  Guarded extremity: Minimal.  Sensory: Patient reports normal.      Coordination  Right   Left     NT WNL Impaired NT WNL Impaired   Gross Motor  x   x    Fine Motor  x   x    9 hole peg x   x       Hand AROM  Right   Left     NT WNL Impaired NT WNL Impaired   Full Fist  x   x    Flat Fist  x   x    Claw Fist  x   x      ROM/STRENGTH RIGHT LEFT   Note: * indicates pain AROM AROM   Shoulder Flexion - 180? WNL WNL   Shoulder Ext - 60?  WNL WNL   Shoulder Abd - 180? WNL WNL   Elbow Flexion - 150? WNL WNL    Elbow Extension - 0?    WNL WNL   Forearm Supination - 80? WNL WNL   Forearm Pronation - 80? WNL WNL   Wrist Flexion - 65-80?  WNL WNL   Wrist Extension - 65-80? WNL WNL   Ulnar Deviation - 20-35?     Radial Deviation - 10-20?      Strength 45# 35#   3 Point Pinch 9# 9#   Lateral Pinch 8# 9#     May benefit from PT evaluation: Having PT    U/E orthosis currently:   No    Plan for next visit: fabricate bilateral thumb spica orthosis, isometric thumb strengthening, kinesiotape, STM    Treatment Today:  Patient instructed on activity modification and joint protection techniques today. Shown several pieces of adaptive equipment today and educated patient on arthritis and importance of protecting her joints from further damage.  TREATMENT MINUTES COMMENTS   Evaluation 25    Self-care/ Home management 25    Manual therapy     Neuromuscular Re-education     Orthotic fitting     Therapeutic Exercises     Iontophoresis           Total 50    Blank areas are intentional and mean the treatment did not include these items.     GOALS AND PLAN OF CARE WERE ESTABLISHED IN COOPERATION WITH THE PATIENT    OT Evaluation Code: (Please list factors)   Comorbidities: none   Profile/History Review: Brief    Need for eval modification: No    # Treatment options: Limited    Clinical Decision Making:  Low      Occupational Profile/ Medical and Therapy History and Comorbidities Occupational Performance Clinical Decision Making   (Complexity)   brief history with review of medical/therapy records related to the presenting problem.  No comorbidities 1-3 Performance deficits that result in activity limitations and/or participation restrictions.    No Assessment Modification  Low complexity, which includes  problem-focused assessments, and consideration of a limited number of treatment options.      expanded review of medical/therapy records and additional review of physical, cognitive and psychosocial history.    May have comorbidities 3-5  Performance deficits that result in activity limitations and/or participation restrictions.    Minimal to moderate modification of assessment Moderate complexity, which includes analysis of data from detailed assessments, and consideration of several treatment options.         Review of medical/therapy records and extensive additional review of physical, cognitive and psychosocial history.  Comorbidities affect occupational performance 5 or more Performance deficits that result in activity limitations and/or participation restrictions.    Significant modification of assessment High complexity, analysis of  Occupational profile and data,  Comprehensive assessments, multiple treatment options.            Sandra Rodriguez  6/5/2019  11:34 AM

## 2021-05-30 NOTE — PROGRESS NOTES
[facility] Daily Progress     Patient Name: Holly Wood  Date: 6/25/2019  Visit #: 4/4-6 visits   PTA visit #:    Referral Diagnosis:   M25.552 (ICD-10-CM) - Hip pain, left   M16.12 (ICD-10-CM) - Primary osteoarthritis of left hip   M25.562, G89.29 (ICD-10-CM) - Chronic pain of left knee   M25.561, G89.29 (ICD-10-CM) - Chronic pain of right knee   M17.12 (ICD-10-CM) - Primary osteoarthritis of left knee   Referring provider: Laurel Beasley MD  Visit Diagnosis:     ICD-10-CM    1. Chronic midline low back pain without sciatica M54.5     G89.29    2. Chronic hip pain, left M25.552     G89.29    3. Generalized muscle weakness M62.81          Assessment:   Patient reports improving pain and appears overall happy with her progress. Feels 75% improved since IE.    HEP/POC compliance is  good .  Patient is benefitting from skilled physical therapy and is making steady progress toward functional goals.  Patient is appropriate to continue with skilled physical therapy intervention, as indicated by initial plan of care.    Goal Status:  Pt. will demonstrate/verbalize independence in self-management of condition in : 2 weeks (met)  Pt. will be independent with home exercise program in : 2 weeks (met)  Pt will: walk > 10 minutes without stopping due to pain to improve walking tolerance in 8 weeks   Pt will: ascend/descend stairs with 1 HR with pain <3/10 on NPRS to improve stair climbing in 8 weeks  Pt will: sit > 10' wihtout increased pain to improve sitting tolerance in 8 weeks       Plan / Patient Education:     Continue with initial plan of care.  Progress with home program as tolerated.    Subjective:   Patient reports her pain is improving. Wondering if sitting can aggravate her pain. Discussed importance of posture for improving pain. Estimates 75% improvement in hip and lower back pain since IE.    Pain Rating: 3/10 pain today currently in lower back/hip     Objective:   Lumbar AROM:   Flx: no  increased pain   Ext: slight pain   B rot: no increased pain     Treatment Today     TREATMENT MINUTES COMMENTS   Evaluation     Self-care/ Home management     Manual therapy     Neuromuscular Re-education     Therapeutic Activity     Therapeutic Exercises 28 Exercises progressed:  Exercise #1: Piriformis  Comment #1: x 30 seconds x 2-3 reps - already performing   Exercise #2: TA up/up/down/down   Comment #2: x 10 reps, 2-3 sets  Exercise #3: Bridge   Comment #3: x 5 seconds x 15-20 reps   Exercise #4: Sidelying SLR with TA  Comment #4: x 5-10 reps B   Exercise #5: Supine SLR with ab set  Exercise #6: x 10 reps B with ab/quad set  Exercise #7: Hooklying hip abduction  Comment #7: L3 band 2 x 10 with TA set   Exercise #8: Glut raises   Comment #8: x 10 reps x 2 sets        Gait training     Modality__________________                Total 28    Blank areas are intentional and mean the treatment did not include these items.       Renata Rao  6/25/2019

## 2021-05-30 NOTE — PROGRESS NOTES
[facility] Daily Progress/Discharge Summary     Patient Name: Holly Wood  Date: 7/3/2019  Visit #: 5/4-6 visits   PTA visit #:    Referral Diagnosis:   M25.552 (ICD-10-CM) - Hip pain, left   M16.12 (ICD-10-CM) - Primary osteoarthritis of left hip   M25.562, G89.29 (ICD-10-CM) - Chronic pain of left knee   M25.561, G89.29 (ICD-10-CM) - Chronic pain of right knee   M17.12 (ICD-10-CM) - Primary osteoarthritis of left knee   Referring provider: Laurel Beasley MD  Visit Diagnosis:     ICD-10-CM    1. Chronic midline low back pain without sciatica M54.5     G89.29    2. Chronic hip pain, left M25.552     G89.29    3. Generalized muscle weakness M62.81          Assessment:   Patient has made steady progress since IE. She consistently reports improved pain at each visit. She has been seen for 5 visits including IE and goals are met today's date. Pt's walking tolerance has improved and she is able to sit for longer periods without increased pain. She overall appears happy with her progress and is appropriate for DC to [facility].       Goal Status:  Pt. will demonstrate/verbalize independence in self-management of condition in : 2 weeks (met)  Pt. will be independent with home exercise program in : 2 weeks (met)  Pt will: walk > 10 minutes without stopping due to pain to improve walking tolerance in 8 weeks (met, tolerates 1 hour of walking)  Pt will: ascend/descend stairs with 1 HR with pain <3/10 on NPRS to improve stair climbing in 8 weeks (still has pain, but able to face forward and sometimes perform with reciprocal pattern)   Pt will: sit > 10' wihtout increased pain to improve sitting tolerance in 8 weeks (met)      Plan / Patient Education:     DC from PT. No additional visits scheduled.     Subjective:   Patient reports she is feeling good overall, still some stiffness in her neck/joints and some lower back pain.    Pain Rating: 3/10 pain today currently in lower back/hip     Objective:   Lumbar  AROM:   Flx: no increased pain   Ext: slight pain   B rot: no increased pain     Treatment Today     TREATMENT MINUTES COMMENTS   Evaluation     Self-care/ Home management     Manual therapy     Neuromuscular Re-education     Therapeutic Activity     Therapeutic Exercises 28 Exercises progressed:  Exercise #1: Piriformis  Comment #1: x 30 seconds x 2-3 reps - already performing   Exercise #2: TA - toe taps   Comment #2: x 10 reps, 2-3 sets  Exercise #3: Bridge - with slow alternating march   Comment #3: x 4 reps x 2-3 sets   Exercise #4: Sidelying SLR with TA  Comment #4: x 5-10 reps B   Exercise #5: Supine SLR with ab set  Exercise #6: x 10 reps B with ab/quad set  Exercise #7: Hooklying hip abduction  Comment #7: L3 band 2 x 10 with TA set   Exercise #8: Glut raises   Comment #8: x 10 reps x 2 sets        Gait training     Modality__________________                Total 28    Blank areas are intentional and mean the treatment did not include these items.       Renata Rao  7/3/2019

## 2021-05-31 VITALS — BODY MASS INDEX: 36.82 KG/M2 | WEIGHT: 195 LBS | HEIGHT: 61 IN

## 2021-05-31 VITALS — WEIGHT: 195 LBS | BODY MASS INDEX: 36.84 KG/M2

## 2021-05-31 VITALS — BODY MASS INDEX: 36.84 KG/M2 | WEIGHT: 195 LBS

## 2021-05-31 NOTE — PATIENT INSTRUCTIONS - HE
A left sacroiliac joint injection has been ordered today.  Please schedule this for the month of September.  On the day of your injection, you cannot be sick or taking antibiotics.  If you become sick and are prescribed, please call the clinic so your injection can be rescheduled for once you have completed your antibiotics.  You will need to bring a  with you for your injection.     Please do not hesitate to contact the clinic at [phone] if you have any questions/concerns or any worsening of your pain prior to that time.  You are also welcome to contact Dr. Gao via [employer].

## 2021-05-31 NOTE — PROGRESS NOTES
Assessment:   Holly oWod (AKKALPANA Serrato) is a 69 y.o. y.o. female with past medical history significant for macular degeneration who presents today for follow-up regarding chronic left-sided low back pain thought to be sacroiliac joint pain in etiology.  Her chronic neck pain remains under good control.  Her pain is also thought to have an osteopathic somatic dysfunction component as well.  Continues to have multiple areas of osteopathic somatic dysfunction.  She remains without radicular symptoms or red flag symptoms.     Plan:     A shared decision making plan was used.  The patient's values and choices were respected.  The following represents what was discussed and decided upon by the physician and the patient.      1.  DIAGNOSTIC TESTS:  No further diagnostic tests are necessary at this time.    2.  PHYSICAL THERAPY: Patient is completing physical therapy for core strengthening related to her back pain.  She is encouraged to continue doing her home exercise program on a regular basis.  3.  MEDICATIONS:  No changes to the medications today.    4.  INTERVENTIONS:  Osteopathic manipulation was performed to 7 areas today.  The patient tolerated the treatment well and reported relief immediately afterwards..   Please see below for the osteopathic manipulation that was performed today.  -Repeat left sacroiliac joint injection is ordered today.  She can schedule this for next month when she returns from her trip.  This will be 3 months from her last SI joint injection.  5.  PATIENT EDUCATION:    - The patient was advised to rest today and drink plenty of water.  Normal activities can be resumed as tolerated tomorrow.    - The patient was told that soreness following the treatment is normal and should improve within a few days.  Ice should be used (as opposed to heat) if soreness occurs.  If the soreness is concerning, the clinic should be notified so further instructions can be given.  6.  FOLLOW-UP: The patient can  be seen for the repeat left SI joint injection next month and then follow-up 2 weeks after that injection.. If there are any questions/concerns or any significant worsening of pain prior to that time, the patient is asked to call the clinic via the nurse navigation line or via [employer].     Subjective:     Holly Wood (AKKALPANA Serrato)  is a 69 y.o. female who presents today for follow-up regarding chronic left-sided low back pain thought to be sacroiliac joint pain in etiology.  The patient was last seen on June 18, 2019.  Reports that she has done quite well since that time.  She is very pleased with how she is doing overall.  She is only had a couple of episodes where she has had increased pain, but she feels like she has made so much progress in the last year.  She is here today specifically for osteopathic manipulation, though she feels that when it is time for her to have the left sacroiliac joint injection, she would like to move forward with that at the 3-month interval.    Today she is rating her pain at a 2 out of 10.  At worst it is an 8 out of 10.  At best it is a 2 out of 10.  Her pain is worse with lifting, lying down for long periods of time, sitting appropriately, or walking for long periods of time.  If she exercises or does too much activity this can flare her pain.  However doing her therapy exercises seems to help significantly.  Her previous injections have been helpful and she feels that the osteopathic manipulation has been extremely beneficial for her.  She denies any new symptoms at this time.  She has been going to physical therapy and she feels that the combination of physical therapy and osteopathic manipulation is 1 of the best things that she has never done.  She does manage her pain with ibuprofen, aspirin, gabapentin.  She feels that they are helpful.  She is overall very pleased and would like to continue with osteopathic manipulation today.      Past medical history is reviewed and is  unchanged in the interim.    Family history is reviewed and is unchanged in the interim.    Review of Systems:  Positive for occasional headaches.  Negative for numbness/tingling, weakness, bowel/bladder dysfunction, foot drop, dizziness, nausea/vomiting, blurred vision, balance changes.     Objective:   CONSTITUTIONAL:  Vital signs as above.  No acute distress.  The patient is well nourished and well groomed.    PSYCHIATRIC:  The patient is awake, alert, oriented to person, place and time.  The patient is answering questions appropriately with clear speech.  Normal affect.  SKIN:  Skin over the face, posterior torso, bilateral upper and lower extremities is clean, dry, intact without rashes.  MUSCULOSKELETAL:  Gait is non-antalgic.  The patient is able to transfer independently.      OSTEOPATHIC STRUCTURAL EXAM:  Symmetric iliac crest.  Negative standing flexion test.  Lumbar spine: L4-5 flexed, rotated/side bent left  Sacrum: Right unilateral sacral flexion  Innominates/Pelvis: Anterior/inferior right, posterior/superior left  Thoracic spine: T10-12 flexed, rotated/side bent left  Rib cage: Decreased motion over ribs 2 through 4 bilaterally.  First rib elevated on the left.  Cervical spine: C7 flexed, rotated/side bent right, C2-3 flexed, rotated/side bent right  Head/OA joint: Side bent right/rotated left    OMT:  TREATMENTS IN PARENTHESES  Lumbar spine: L4-5 flexed, rotated/side bent left  (Muscle energy, patient in lateral recumbent)  Sacrum: Right unilateral sacral flexion (ischial tuberosity spread with patient prone)  Innominates/Pelvis: Anterior/inferior right, posterior/superior left  (Muscle energy with the patient supine).  Thoracic spine: T10-12 flexed, rotated/side bent left (Muscle energy, patient seated)  Rib cage: Decreased motion over ribs 2 through 4 bilaterally.  (Myofascial release, patient in the lateral recumbent position).  First rib elevated on the left.  (Myofascial release, patient  supine)  Cervical spine: C7 flexed, rotated/side bent right, C2-3 flexed, rotated/side bent right  (Muscle energy with the patient supine).  Head/OA joint: Side bent right/rotated left (Myofascial release, patient supine)

## 2021-06-01 ENCOUNTER — RECORDS - HEALTHEAST (OUTPATIENT)
Dept: ADMINISTRATIVE | Facility: CLINIC | Age: 71
End: 2021-06-01

## 2021-06-01 VITALS — WEIGHT: 195 LBS | BODY MASS INDEX: 36.84 KG/M2

## 2021-06-01 VITALS — BODY MASS INDEX: 36.84 KG/M2 | WEIGHT: 195 LBS

## 2021-06-01 NOTE — PATIENT INSTRUCTIONS - HE
Follow-up visit in 2 weeks with Dr. Gao to discuss injection outcome and determine care plan going forward.       DISCHARGE INSTRUCTIONS    During office hours (8:00 a.m.- 4:30 p.m.) questions or concerns may be answered  by calling Spine Navigation Nurses at  [phone].     If you experience any problems after hours  please call [phone] and you will be connected to [facility].     All Patients:    ? You may experience an increase in your symptoms for the first 2 days (It may take anywhere between 2 days- 2 weeks for the steroid to have maximum effect).    ? You may use ice on the injection site, as frequently as 20 minutes each hour if needed.    ? You may take your pain medicine.    ? You may continue taking your regular medication after your injection. If you have had a Medial Branch Block you may resume pain medication once your pain diary is completed.    ? You may shower. No swimming, tub bath or hot tub for 48 hours.  You may remove your bandaid/bandage as soon as you are home.    ? You may resume light activities, as tolerated.    ? Resume your usual diet as tolerated.    ? It is strongly advised that you do not drive for 1-3 hours post injection.    ? If you have had oral sedation:  Do not drive for 8 hours post injection.      ? If you have had IV sedation:  Do not drive for 24 hours post injection.  Do not operate hazardous machinery or make important personal/business decisions for 24 hours.      POSSIBLE STEROID SIDE EFFECTS (If steroid/cortisone was used for your procedure)    -If you experience these symptoms, it should only last for a short period      Swelling of the legs                Skin redness (flushing)       Mouth (oral) irritation     Blood sugar (glucose) levels              Sweats                      Mood changes    Headache    Sleeplessness         POSSIBLE PROCEDURE SIDE EFFECTS  -Call the Spine Center if you are concerned    Increased Pain              Increased numbness/tingling        Nausea/Vomiting            Bruising/bleeding at site        Redness or swelling                                                Difficulty walking        Weakness             Fever greater than 100.5    *In the event of a severe headache after an epidural steroid injection that is relieved by lying down, please call the [facility] to speak with a clinical staff member*

## 2021-06-02 VITALS — BODY MASS INDEX: 36.82 KG/M2 | WEIGHT: 195 LBS | HEIGHT: 61 IN

## 2021-06-02 VITALS — BODY MASS INDEX: 36.84 KG/M2 | WEIGHT: 195 LBS

## 2021-06-02 VITALS — BODY MASS INDEX: 34.2 KG/M2 | WEIGHT: 181 LBS

## 2021-06-02 NOTE — PROGRESS NOTES
Assessment:   Holly Wood (AKKALPANA Serrato)  is a 69 y.o. y.o. female with past medical history significant for macular degeneration who presents today for follow-up regarding chronic left-sided low back pain thought to be sacroiliac joint pain in etiology.  The patient is status post a left sacroiliac joint injection on September 19, 2019.  At this time, she was reporting approximately 75% relief of her back pain following this injection.  She is having some left groin pain which is likely from left hip pain secondary to primary hip osteoarthritis.  She has had good relief of this pain with a previous left intra-articular hip joint injection (performed under ultrasound guidance).  The patient is also having some mild neck pain thought to be from osteopathic somatic dysfunction.  Patient has multiple areas of osteopathic somatic dysfunction.  She is without radicular symptoms or red flag symptoms.       Plan:     A shared decision making plan was used.  The patient's values and choices were respected.  The following represents what was discussed and decided upon by the physician and the patient.      1.  DIAGNOSTIC TESTS:  No further diagnostic tests are necessary at this time.    2.  PHYSICAL THERAPY: No further physical therapy at this time.  The patient has previously completed physical therapy and is encouraged to continue doing the home exercise program daily.    3.  MEDICATIONS:  No changes to the medications today.    4.  INTERVENTIONS:  Osteopathic manipulation was performed to 7 areas today.  The patient tolerated the treatment well and she reported relief medially afterwards..   Please see below for the osteopathic manipulation that was performed today.  -The patient would like to have a repeat left intra-articular hip joint injection.  This will be performed with Dr. Beltre at her convenience (okay for next week).  She did have a flu shot today.  Dr. Gao explained that having a cortisone injection  within 2 weeks of the flu vaccination may make the flu vaccination less effective.  Patient verbalized understanding.  -Future sacroiliac joint injections can be considered in the future if pain returns.  She should not have injections more frequently than once every 3 months.  If she can go longer in between injections that is ideal.  -Will monitor the total amount of steroid that the patient has, she has steroid injections for other areas of musculoskeletal pain.  5.  PATIENT EDUCATION:    - The patient was advised to rest today and drink plenty of water.  Normal activities can be resumed as tolerated tomorrow.    - The patient was told that soreness following the treatment is normal and should improve within a few days.  Ice should be used (as opposed to heat) if soreness occurs.  If the soreness is concerning, the clinic should be notified so further instructions can be given.  6.  FOLLOW-UP: The patient is asked to follow-up first available for the injection with Dr. Beltre next week.  She can follow-up with Dr. Gao in 4 weeks for potential further osteopathic manipulation.. If there are any questions/concerns or any significant worsening of pain prior to that time, the patient is asked to call the clinic via the nurse navigation line or via [employer].     Subjective:     Holly Wood (AKA Ama) is a 69 y.o. female who presents today for follow-up regarding chronic left-sided low back pain thought to be sacroiliac joint pain in etiology.  The patient is status post a left sacroiliac joint injection on September 19, 2019.  The patient reports that she has had approximately 75% relief following this injection.  She feels good with regards to her back, but she is having some increased pain in her left groin area.  She thinks it is time to repeat the left intra-articular hip joint injection, she did have good relief with this when this was last performed back in April 2019.  Patient is also complaining  of some left-sided neck pain.  Reports that this is from that her previous neck pain.  She thinks that this neck pain is related to stress.  Her  has been diagnosed with expressive aphasia, but they have not been able to find a cause so it is very difficult to treat it.  She reports that this creates an incredible amount of stress for her.  She thinks that the neck pain is related to the stress.  She denies any radiation of pain going down the left arm.  No numbness tingling or weakness in the left arm.  In general her pain is worse with walking and lifting.  It is also worse at the end of the day.  She does state that the exercises, osteopathic manipulation, and gabapentin, amitriptyline and aspirin do provide significant relief.  She is rating her pain today at a 1 out of 10.    Past medical history is reviewed and is unchanged in the interim.    Family history is reviewed and is unchanged in the interim.    Review of Systems:  Negative for numbness/tingling, weakness, bowel/bladder dysfunction, foot drop, headache, dizziness, nausea/vomiting, blurred vision, balance changes.     Objective:   CONSTITUTIONAL:  Vital signs as above.  No acute distress.  The patient is well nourished and well groomed.    PSYCHIATRIC:  The patient is awake, alert, oriented to person, place and time.  The patient is answering questions appropriately with clear speech.  Normal affect.  SKIN:  Skin over the face, posterior torso, bilateral upper and lower extremities is clean, dry, intact without rashes.  MUSCULOSKELETAL:  Gait is non-antalgic.  The patient is able to transfer independently.      OSTEOPATHIC STRUCTURAL EXAM:  Standing flexion test on the right side.  Right inferior iliac crest.  Lumbar spine: L4-5 flexed, rotated/side bent left  Sacrum: Right unilateral sacral flexion  Innominates/Pelvis: Anterior/inferior right, right down slipped innominate  Thoracic spine: T7-9 flexed, rotated/side bent left  Rib cage:  Decreased motion over ribs 5 through 9 on the left.  First rib elevated on the left.  Cervical spine: C7 flexed, rotated/side bent right  Head/OA joint: Side bent right/rotated left, decreased CRI    OMT:  TREATMENTS IN PARENTHESES  Lumbar spine: L4-5 flexed, rotated/side bent left  (Muscle energy, patient in lateral recumbent)  Sacrum: Right unilateral sacral flexion (Myofascial release, patient prone).  Innominates/Pelvis: Anterior/inferior right  (Muscle energy with the patient supine)., right down slipped innominate (Myofascial release, patient prone).  Thoracic spine: T7-9 flexed, rotated/side bent left (Muscle energy, patient seated)  Rib cage: Decreased motion over ribs 5 through 9 on the left.  (Myofascial release, patient in the lateral recumbent position).  First rib elevated on the left.  (Myofascial release, patient supine)  Cervical spine: C7 flexed, rotated/side bent right  (Muscle energy with the patient supine).  Head/OA joint: Side bent right/rotated left (Myofascial release, patient supine), decreased CRI (cranial stacking technique with patient supine)

## 2021-06-02 NOTE — PATIENT INSTRUCTIONS - HE
DISCHARGE INSTRUCTIONS    During office hours (8:00 a.m.- 4:30 p.m.) questions or concerns may be answered  by calling Spine Navigation Nurses at  [phone].     If you experience any problems after hours  please call [phone] and you will be connected to [facility].     All Patients:    ? You may experience an increase in your symptoms for the first 2 days (It may take anywhere between 2 days- 2 weeks for the steroid to have maximum effect).    ? You may use ice on the injection site, as frequently as 20 minutes each hour if needed.    ? You may take your pain medicine.    ? You may continue taking your regular medication after your injection.    ? You may shower. No swimming, tub bath or hot tub for 48 hours.  You may remove your bandaid/bandage as soon as you are home.    ? You may resume light activities, as tolerated.    ? Resume your usual diet as tolerated.      POSSIBLE STEROID SIDE EFFECTS (If steroid/cortisone was used for your procedure)    -If you experience these symptoms, it should only last for a short period      Swelling of the legs                Skin redness (flushing)       Mouth (oral) irritation     Blood sugar (glucose) levels              Sweats                      Mood changes    Headache    Sleeplessness         POSSIBLE PROCEDURE SIDE EFFECTS  -Call the Spine Center if you are concerned    Increased Pain             Increased numbness/tingling        Nausea/Vomiting            Bruising/bleeding at site        Redness or swelling                                                Difficulty walking        Weakness             Fever greater than 100.5

## 2021-06-02 NOTE — PATIENT INSTRUCTIONS - HE
A left intra-articular hip joint injection under ultrasound guidance has been ordered today.  This will be performed with Dr. Beltre.  On the day of your injection, you cannot be sick or taking antibiotics.  If you become sick and are prescribed, please call the clinic so your injection can be rescheduled for once you have completed your antibiotics.  You will need to bring a  with you for your injection.   If you have any questions or concerns prior to your injection, please do not hesitate to call the nurse navigation line at [phone] or contact Dr. Gao through [employer].      Please rest today and drink plenty of water.      It is normal to have soreness following the treatment.  Please use ice over the sore areas.  You may take your usual pain medications.  Please call the clinic at [phone] or contact Dr. Gao via [employer] if the soreness is concerning to you.      Please follow-up in 4 weeks.

## 2021-06-03 VITALS — BODY MASS INDEX: 34.2 KG/M2 | WEIGHT: 181 LBS

## 2021-06-03 NOTE — PROGRESS NOTES
Assessment:   Holly Wood (AKA Ama)  is a 69 y.o. y.o. female with past medical history significant for macular degeneration who presents today for follow-up regarding left hip pain.  The patient is status post a left intra-articular hip joint injection with Dr. Beltre on October 9, 2019.  Patient is reporting approximately 75% relief from pain in this area after this injection.  She continues to report some mild pain in her neck..  There is thought to be an osteopathic somatic dysfunction component to her pain.  She does have multiple areas of osteopathic somatic dysfunction as listed below.  She remains without radicular or red flag symptoms.       Plan:     A shared decision making plan was used.  The patient's values and choices were respected.  The following represents what was discussed and decided upon by the physician and the patient.      1.  DIAGNOSTIC TESTS:  No further diagnostic tests are necessary at this time.    2.  PHYSICAL THERAPY: No further physical therapy at this time.  The patient has previously completed physical therapy and is encouraged to continue doing the home exercise program daily.    3.  MEDICATIONS:  No changes to the medications today.    4.  INTERVENTIONS:  Osteopathic manipulation was performed to 7 areas today.  The patient tolerated the treatment well and she reported relief immediately afterwards..   Please see below for the osteopathic manipulation that was performed today.  5.  PATIENT EDUCATION:    - The patient was advised to rest today and drink plenty of water.  Normal activities can be resumed as tolerated tomorrow.    - The patient was told that soreness following the treatment is normal and should improve within a few days.  Ice should be used (as opposed to heat) if soreness occurs.  If the soreness is concerning, the clinic should be notified so further instructions can be given.  6.  FOLLOW-UP: The patient is asked to follow-up in approximately 4 weeks.  If there are any questions/concerns or any significant worsening of pain prior to that time, the patient is asked to call the clinic via the nurse navigation line or via [employer].     Subjective:     Holly Wood (AKA Ama) is a 69 y.o. female who presents today for follow-up regarding left hip pain.  She is status post a left intra-articular hip joint injection by Dr. Beltre on October 9, 2019.  Patient states that she has had approximately 75% relief following this injection.  She is quite pleased with how she is doing.  She still has some mild neck pain.  She would like to continue with osteopathic manipulation at this time.  She is rating her pain today at a 1 out of 10.  At best it is a 1 out of 10.  At worst it is an 8 out of 10.  Her pain is worse with standing, walking for long distances or laying on her left side.  She does feel better with taking her medications which include gabapentin, ibuprofen, aspirin.  Her home exercise program from physical therapy has helped as well as the injection and the osteopathic manipulation.  She does not have any questions or concerns today.    Past medical history is reviewed and is unchanged in the interim.    Family history is reviewed and is unchanged in the interim.    Review of Systems:  Negative for numbness/tingling, weakness, bowel/bladder dysfunction, foot drop, headache, dizziness, nausea/vomiting, blurred vision, balance changes.     Objective:   CONSTITUTIONAL:  Vital signs as above.  No acute distress.  The patient is well nourished and well groomed.    PSYCHIATRIC:  The patient is awake, alert, oriented to person, place and time.  The patient is answering questions appropriately with clear speech.  Normal affect.  SKIN:  Skin over the face, posterior torso, bilateral upper and lower extremities is clean, dry, intact without rashes.  MUSCULOSKELETAL:  Gait is non-antalgic.  The patient is able to transfer independently.      OSTEOPATHIC STRUCTURAL  EXAM:  Negative standing flexion test  Lumbar spine: L3-5 flexed, rotated/side bent left  Sacrum: Right on right forward sacral torsion  Innominates/Pelvis: Anterior/inferior right, posterior/superior left  Thoracic spine: T10-12 flexed, rotated/side bent left  Rib cage: First rib elevated on the left, decreased motion over ribs 3 through 7 on the left.  Cervical spine: C3-4 flexed, rotated/side bent right  Head/OA joint: Side bent right/rotated left    OMT:  TREATMENTS IN PARENTHESES  Lumbar spine: L3-5 flexed, rotated/side bent left (Myofascial release, patient prone).  Sacrum: Right on right forward sacral torsion (Myofascial release, patient prone).  Innominates/Pelvis: Anterior/inferior right, posterior/superior left  (Muscle energy with the patient supine).  Thoracic spine: T10-12 flexed, rotated/side bent left (Muscle energy, patient seated)  Rib cage: First rib elevated on the left (Myofascial release, patient supine), decreased motion over ribs 3 through 7 on the left.  (Myofascial release, patient in the lateral recumbent position).  Cervical spine: C3-4 flexed, rotated/side bent right (Myofascial release, patient supine)  Head/OA joint: Side bent right/rotated left (Myofascial release, patient supine)

## 2021-06-03 NOTE — PATIENT INSTRUCTIONS - HE
Please rest today and drink plenty of water.      It is normal to have soreness following the treatment.  Please use ice over the sore areas.  You may take your usual pain medications.  Please call the clinic at [phone] or contact Dr. Gao via [employer] if the soreness is concerning to you.      Please follow-up in 4 weeks.

## 2021-06-03 NOTE — PROGRESS NOTES
Assessment:   Holly Wood (AKKALPANA Serrato) is a 69 y.o. y.o. female with past medical history significant for macular degeneration who presents today for follow-up regarding left-sided low back pain without radicular/sciatic type leg symptoms thought to be from sacroiliac joint pain in etiology in addition to osteopathic somatic dysfunction.  The patient was last seen on November 7, 2019 for osteopathic manipulation and she has done quite well up until the last couple of days.  She continues to have multiple areas of osteopathic somatic dysfunction as listed below.  She remains without radicular or red flag symptoms.         Plan:     A shared decision making plan was used.  The patient's values and choices were respected.  The following represents what was discussed and decided upon by the physician and the patient.      1.  DIAGNOSTIC TESTS:  No further diagnostic tests are necessary at this time.    2.  PHYSICAL THERAPY: No further physical therapy at this time.  The patient has previously completed physical therapy and is encouraged to continue doing the home exercise program daily.    3.  MEDICATIONS:  No changes to the medications today.    4.  INTERVENTIONS:  Osteopathic manipulation was performed to 7 areas today.  The patient tolerated the treatment well and she reported relief immediately afterwards.   Please see below for the osteopathic manipulation that was performed today.  5.  PATIENT EDUCATION:    - The patient was advised to rest today and drink plenty of water.  Normal activities can be resumed as tolerated tomorrow.    - The patient was told that soreness following the treatment is normal and should improve within a few days.  Ice should be used (as opposed to heat) if soreness occurs.  If the soreness is concerning, the clinic should be notified so further instructions can be given.  -Discussed with the patient may benefit from an occipital release tool.  She was shown with the use look like, and  they can be purchased on [employer].  6.  FOLLOW-UP: The patient is asked to follow-up in approximately 4 weeks. If there are any questions/concerns or any significant worsening of pain prior to that time, the patient is asked to call the clinic via the nurse navigation line or via [employer].     Subjective:     Holly Wood (AKKALPANA Serrato) is a 69 y.o. female who presents today for follow-up regarding chronic left-sided low back pain without radicular/sciatic type leg symptoms.  The patient reports that her neck pain has been under good control with the osteopathic manipulation treatments.  She reports that since she was last seen on November 7, 2019, she has noticed significant relief of her neck pain.  She has just had return of her left low back pain in the last couple of days.  It is located near the left SI joint.  It does not radiate down into the leg.  She denies any numbness tingling or weakness in the leg.  Today she rates her pain at a 1 out of 10.  At best it is a 1 out of 10.  At worst it is a 7 out of 10.  Her pain is worse with laying down on her left side.  She does feel better with taking medications.  She remains on the gabapentin 300 mg at bedtime which is quite helpful for her.  Doing her exercises from physical therapy as well as osteopathic manipulation all provide pain relief.  She is quite pleased with how she is doing overall.    Past medical history is reviewed and is unchanged in the interim.    Family history is reviewed and is unchanged in the interim.    Review of Systems:  Negative for numbness/tingling, weakness, bowel/bladder dysfunction, foot drop, headache, dizziness, nausea/vomiting, blurred vision, balance changes.     Objective:   CONSTITUTIONAL:  Vital signs as above.  No acute distress.  The patient is well nourished and well groomed.    PSYCHIATRIC:  The patient is awake, alert, oriented to person, place and time.  The patient is answering questions appropriately with clear speech.   Normal affect.  SKIN:  Skin over the face, posterior torso, bilateral upper and lower extremities is clean, dry, intact without rashes.  MUSCULOSKELETAL:  Gait is non-antalgic.  The patient is able to transfer independently.      OSTEOPATHIC STRUCTURAL EXAM:  Positive standing flexion test on the right.  Right inferior iliac crest.  Lumbar spine: L2-3 flexed, rotated/side bent left  Sacrum: Right unilateral sacral flexion  Innominates/Pelvis: Right down slipped innominate, anterior/inferior right  Thoracic spine: T2-4 flexed, rotated/side bent left  Rib cage: First rib elevated on the left  Cervical spine: C2-3 flexed, rotated/side bent right, C7 flexed, rotated/side bent right  Head/OA joint: Decreased fascial motion over the occiput    OMT:  TREATMENTS IN PARENTHESES  Lumbar spine: L2-3 flexed, rotated/side bent left  (Muscle energy, patient in lateral recumbent)  Sacrum: Right unilateral sacral flexion (ischial tuberosity spread with patient prone)  Innominates/Pelvis: Right down slipped innominate (Myofascial release, patient prone), anterior/inferior right  (Muscle energy with the patient supine).  Thoracic spine: T2-4 flexed, rotated/side bent left (Muscle energy, patient seated)  Rib cage: First rib elevated on the left (Myofascial release, patient supine)  Cervical spine: C2-3 flexed, rotated/side bent right  (Muscle energy with the patient supine)., C7 flexed, rotated/side bent right  (Muscle energy with the patient supine).  Head/OA joint: Decreased fascial motion over the occiput (suboccipital tension release with patient supine)

## 2021-06-04 ENCOUNTER — COMMUNICATION - HEALTHEAST (OUTPATIENT)
Dept: PHYSICAL MEDICINE AND REHAB | Facility: CLINIC | Age: 71
End: 2021-06-04

## 2021-06-04 DIAGNOSIS — M25.562 PAIN IN BOTH KNEES, UNSPECIFIED CHRONICITY: ICD-10-CM

## 2021-06-04 DIAGNOSIS — M79.18 LEFT BUTTOCK PAIN: ICD-10-CM

## 2021-06-04 DIAGNOSIS — M25.561 PAIN IN BOTH KNEES, UNSPECIFIED CHRONICITY: ICD-10-CM

## 2021-06-04 RX ORDER — GABAPENTIN 300 MG/1
CAPSULE ORAL
Qty: 90 CAPSULE | Refills: 0 | Status: SHIPPED | OUTPATIENT
Start: 2021-06-04 | End: 2021-09-07

## 2021-06-04 NOTE — PATIENT INSTRUCTIONS - HE
"You can buy a neck release tool on [employer].  Search under \"occipital release\".    Please rest today and drink plenty of water.      It is normal to have soreness following the treatment.  Please use ice over the sore areas.  You may take your usual pain medications.  Please call the clinic at [phone] or contact Dr. Gao via [employer] if the soreness is concerning to you.      Please follow-up in 4 weeks.        "

## 2021-06-07 NOTE — TELEPHONE ENCOUNTER
"PSP:  Dr. Gao  Last clinic visit:  12/5/19 (OMT done at that time)  Reason for call: questions in regards to returning pain & if her OMT appt is still scheduled for June  Pt wanted to pass on message that while she had been taking care of  for the past 3 months (her  passed away last month), her pain started to get worse again and become a \"little ragged feeling\", and she also pulled a hamstring in Feb, which she saw primary for.  Advice given to patient: Plan on coming to your OMT appt with Dr. Gao on 6/5/20, unless symptoms become worse in nature (such as worsening pain, loss of strength, bowel.bladder issues), then we would want you to call the Sp[facility] instead of waiting.  Staff also let pt know that E-visits and Video calls are also an option, if she would like to do this in the future, prior to OMT.  Pt understands and was in agreement with calling, if need be.  "

## 2021-06-08 NOTE — PATIENT INSTRUCTIONS - HE
Right and left hip joint injections have been ordered today.  You can schedule these injections with Dr. Beltre for his first available.  On the day of your injection, you cannot be sick or taking antibiotics.  If you become sick and are prescribed, please call the clinic so your injection can be rescheduled for once you have completed your antibiotics.  You will need to bring a  with you for your injection.   If you have any questions or concerns prior to your injection, please do not hesitate to call the nurse navigation line at [phone] or contact Dr. Gao through [employer].    Please rest today and drink plenty of water.      It is normal to have soreness following the treatment.  Please use ice over the sore areas.  You may take your usual pain medications.  Please call the clinic at [phone] or contact Dr. Gao via [employer] if the soreness is concerning to you.      Please follow-up in 2 weeks with Dr. Gao for in person OMT.

## 2021-06-08 NOTE — PATIENT INSTRUCTIONS - HE
Please follow up with Dr Gao as scheduled.       DISCHARGE INSTRUCTIONS    During office hours (8:00 a.m.- 4:00 p.m.) questions or concerns may be answered  by calling [facility] Navigation Nurses at  [phone].  Messages received after hours will be returned the following business day.      In the case of an emergency, please dial 911 or seek assistance at the nearest Emergency Room/Urgent Care facility.     All Patients:    ? You may experience an increase in your symptoms for the first 2 days (It may take anywhere between 2 days- 2 weeks for the steroid to have maximum effect).    ? You may use ice on the injection site, as frequently as 20 minutes each hour if needed.    ? You may take your pain medicine.    ? You may continue taking your regular medication after your injection. If you have had a Medial Branch Block you may resume pain medication once your pain diary is completed.    ? You may shower. No swimming, tub bath or hot tub for 48 hours.  You may remove your bandaid/bandage as soon as you are home.    ? You may resume light activities, as tolerated.    ? Resume your usual diet as tolerated.    ? It is strongly advised that you do not drive for 1-3 hours post injection.    ? If you have had oral sedation:  Do not drive for 8 hours post injection.      ? If you have had IV sedation:  Do not drive for 24 hours post injection.  Do not operate hazardous machinery or make important personal/business decisions for 24 hours.      POSSIBLE STEROID SIDE EFFECTS (If steroid/cortisone was used for your procedure)    -If you experience these symptoms, it should only last for a short period      Swelling of the legs                Skin redness (flushing)       Mouth (oral) irritation     Blood sugar (glucose) levels              Sweats                      Mood changes    Headache    Sleeplessness         POSSIBLE PROCEDURE SIDE EFFECTS  -Call the Sp[facility] Center if you are concerned    Increased Pain              Increased numbness/tingling        Nausea/Vomiting            Bruising/bleeding at site        Redness or swelling                                                Difficulty walking        Weakness             Fever greater than 100.5    *In the event of a severe headache after an epidural steroid injection that is relieved by lying down, please call the [facility] to speak with a clinical staff member*

## 2021-06-08 NOTE — PROGRESS NOTES
Assessment:   Holly Wood (AKKALPANA Serrato)  is a 70 y.o. y.o. female with past medical history significant for macular degeneration who presents today for IN PERSON follow-up regarding chronic  left-sided low back pain without radicular/sciatica type leg symptoms thought to be from sacroiliac joint pain in etiology in addition to osteopathic somatic dysfunction.  Patient was last seen on December 5 of 2019.  She did receive osteopathic manipulation at that time and reports that she was doing better but when her  passed away on March 19th, 2020 she has had significantly more pain that has worsened in nature.  She is now having pain across her bilateral low back and into the bilateral buttocks.  The buttock pain is likely intra-articular hip joint pain as she has responded quite well in the past with previous intra-articular hip joint injections.  There may be an element of sacroiliac joint pain and she is also previously done well with sacroiliac joint injections.  She is complaining of worsening of her chronic bilateral knee pain secondary to osteoarthritis (primary) of the knees.  She continues to have multiple areas of osteopathic somatic dysfunction as listed below.  She remains without radicular or red flag symptoms.       Plan:     A shared decision making plan was used.  The patient's values and choices were respected.  The following represents what was discussed and decided upon by the physician and the patient.      1.  DIAGNOSTIC TESTS:    -The patient's x-rays of the bilateral knees from May 7 of 2019 were personally reviewed today by the physician with the physician performing her own interpretation.  Patient does have mild degenerative changes seen in the patellofemoral compartment, slightly worse laterally.  Otherwise no gross effusion seen.  -Patient's x-rays of the pelvis and bilateral hips from July 21 of 2018 was personally reviewed today by the physician with the physician performing her own  interpretation.  She does have mild osteoarthritis in the pelvis and hip joints bilaterally, as well as in the pubic symphysis.  There is lumbar disc degeneration seen in the lower lumbar spine.  2.  PHYSICAL THERAPY: The patient was offered a course of physical therapy specifically to focus on knee stabilizer strengthening.  She declines at this time.  The patient has previously completed physical therapy and is encouraged to continue doing the home exercise program daily.    3.  MEDICATIONS:  No changes to the medications today.    4.  INTERVENTIONS:  Osteopathic manipulation was performed to 7 areas today.  The patient tolerated the treatment well and she reported some soreness immediately afterwards.   Please see below for the osteopathic manipulation that was performed today.  -Patient was requesting repeat injections for the bilateral buttock pain.  She states that she had significant relief with the left intra-articular hip joint injection last performed in October 2019.  She thinks that she might of had more relief with this injection than with the left SI joint injection that was performed in September 2019, though she admits that maybe the combination of the 2 helped significantly.  We will start with a bilateral intra-articular hip joint injections at this time.  Can consider SI joint injections after that depending on her response.  5.  PATIENT EDUCATION:    - The patient was advised to rest today and drink plenty of water.  Normal activities can be resumed as tolerated tomorrow.    - The patient was told that soreness following the treatment is normal and should improve within a few days.  Ice should be used (as opposed to heat) if soreness occurs.  If the soreness is concerning, the clinic should be notified so further instructions can be given.  -All of her questions were answered to her satisfaction today.  She was in agreement with the treatment plan.  6.  FOLLOW-UP: The patient is asked to  follow-up first available with Dr. Beltre for the intra-articular hip joint injections and then 2 weeks from today with Dr. Gao for further osteopathic manipulation.. If there are any questions/concerns or any significant worsening of pain prior to that time, the patient is asked to call the clinic via the nurse navigation line or via [employer].     Subjective:     Holly Wood (AKA Ama) is a 70 y.o. female who presents today for follow-up regarding multiple areas of pain that have worsened since she was last seen on December 5 of 2019.  The patient reports that she did well with osteopathic manipulation was performed at that time.  However she reports that her  became very ill in January 2020.  He declined very quickly and he actually passed away on March 19 of 2020.  She reports that she had to take care of him during that time, and sounds like this was somewhat physically taxing on her.  She is complaining of midline low back pain with radiation into the bilateral buttocks, slightly worse on the left side compared to the right.  She also complains of bilateral knee pain that is slightly worse than baseline (states that she has had a burning sensation in her knees for the last 30 years).  She does get some pain in the proximal/lateral thighs (essentially her buttock) as well, but this does not sound like it is sciatica type pain.  She is rating her pain today at a 5 out of 10.  At worst it is a 7 under 10.  She states that her pain is worse with sitting, standing, lying in bed.  She does feel better with aspirin and taking exercises.  She does have a new symptom of some numbness and tingling in her fingers and her toes.    She is currently managing her pain with aspirin 325 mg twice a day as needed for pain, gabapentin 300 mg at bedtime, amitriptyline 25 mg at bedtime.    She is very interested in continuing the osteopathic manipulation in addition to scheduling repeat injections.    Past  medical history is reviewed and is unchanged in the interim.    Family history is reviewed and is unchanged in the interim.    Review of Systems: Positive for numbness and tingling in the fingers and toes..  Pertinent negatives include no fevers, chills, unexplained weight loss, bowel incontinence, bladder incontinence, trips, stumbles, falls.  All others reviewed and are negative.     Objective:   CONSTITUTIONAL:  Vital signs as above.  No acute distress.  The patient is well nourished and well groomed.    PSYCHIATRIC:  The patient is awake, alert, oriented to person, place and time.  The patient is answering questions appropriately with clear speech.  Normal affect.  SKIN:  Skin over the face, posterior torso, bilateral upper and lower extremities is clean, dry, intact without rashes.  MUSCULOSKELETAL:  Gait is non-antalgic.  The patient is able to transfer independently.  She has 5/5 strength of the bilateral hip flexors, knee flexors/extensors, ankle dorsiflexors/plantar flexors, ankle evertors/invertors.  NEURO: 2/4 patellar and achilles reflexes bilaterally.  Unable to elicit medial hamstring reflexes bilaterally.  Sensation to light touch is intact in the bilateral L4, 5, and S1 dermatomes.  No ankle clonus bilaterally.    OSTEOPATHIC STRUCTURAL EXAM:  Symmetric iliac crest.  Negative standing flexion test.  Lumbar spine: L4-5 flexed, rotated/side bent left  Sacrum: Right on right forward sacral torsion  Innominates/Pelvis: Anterior/inferior right, posterior/superior left  Thoracic spine: T10-12 flexed, rotated/side bent left  Rib cage: First rib elevated on the left  Cervical spine: Left C4, C5, C6 tender points.  Head/OA joint: Side bent right/rotated left    OMT:  TREATMENTS IN PARENTHESES  Lumbar spine: L4-5 flexed, rotated/side bent left  (Muscle energy, patient in lateral recumbent)  Sacrum: Right on right forward sacral torsion (Myofascial release, patient prone).  Innominates/Pelvis: Anterior/inferior  right, posterior/superior left  (Muscle energy with the patient supine).  Thoracic spine: T10-12 flexed, rotated/side bent left  (Muscle energy, patient in lateral recumbent)  Rib cage: First rib elevated on the left (Myofascial release, patient supine)  Cervical spine: Left C4, C5, C6 tender points.  (Strain counterstrain with the patient's neck flexed and slightly side bent to the left with rotation to the left, with the patient in a supine position).  Head/OA joint: Side bent right/rotated left  (Muscle energy, patient in lateral recumbent)

## 2021-06-08 NOTE — PROGRESS NOTES
Assessment:   Holly Wood (AKKALPANA Serrato) is a 70 y.o. y.o. female with past medical history significant for macular degeneration who presents today for IN PERSON follow-up regarding chronic bilateral low low back pain thought to be from sacroiliac joint pain and etiology.  Patient is also complaining of chronic bilateral knee pain thought to be from primary osteoarthritis.  Her areas of pain are stable at this time.  There is also thought to be an osteopathic somatic dysfunction component to her pain.  She has multiple areas of asked about the extent of dysfunction as listed below.  She is without any red flag symptoms.    PSP:  Dr. Gao       Plan:     A shared decision making plan was used.  The patient's values and choices were respected.  The following represents what was discussed and decided upon by the physician and the patient.      1.  DIAGNOSTIC TESTS:  No further diagnostic tests are necessary at this time.   -  Patient has had x-rays of bilateral knees in May 2019 as well as x-rays of the pelvis and hips from July 2018.    -The patient's x-rays of the bilateral knees were personally reviewed today by the physician with the physician performing her own interpretation.  She does have mild osteoarthritis of the knees.  No significant effusion seen.  -No further diagnostic tests necessary at this time.  2.  PHYSICAL THERAPY: At the last visit she declined an offer for physical therapy to work on knee stabilizer strengthening and she declines again today and offer for physical therapy.  This offer stands and she can go to physical therapy at any time for the knees.  She has completed physical therapy in the past for the low back.  She is encouraged to continue doing her home exercise program on a regular basis.  3.  MEDICATIONS:  No changes to the medications today.    -She can continue to take the gabapentin 300 mg at bedtime.  -She can continue to take the Elavil 25 mg at bedtime.  -Can continue to  take aspirin as needed for pain.  4.  INTERVENTIONS:  Osteopathic manipulation was performed to 7 areas today.  The patient tolerated the treatment well and she reported relief immediately afterwards.   Please see below for the osteopathic manipulation that was performed today.  -The patient would like to proceed with repeat bilateral sacroiliac joint injections.  An order was provided today and she can schedule this with Dr. Gao.  -The patient is potentially interested in corticosteroid injections for the knees, but she opted to proceed with the bilateral sacroiliac joint injections first.  We will discuss the knee joint injections with her in the future.  5.  PATIENT EDUCATION:    - The patient was advised to rest today and drink plenty of water.  Normal activities can be resumed as tolerated tomorrow.    - The patient was told that soreness following the treatment is normal and should improve within a few days.  Ice should be used (as opposed to heat) if soreness occurs.  If the soreness is concerning, the clinic should be notified so further instructions can be given.  -All of her questions were answered to her satisfaction today.  She was in agreement with the treatment plan.  6.  FOLLOW-UP: The patient is asked to follow-up on her after June 30 for the sacroiliac joint injections.. If there are any questions/concerns or any significant worsening of pain prior to that time, the patient is asked to call the clinic via the nurse navigation line or via [employer].     Subjective:     Holly Wood (AKA Ama) is a 70 y.o. female who presents today for follow-up regarding bilateral low back pain with radiation into the bilateral buttocks thought to be from sacroiliac joint pain and etiology.  The patient was last seen in clinic on June 5, 2020 by Dr. Gao for osteopathic manipulation.  She underwent bilateral greater trochanteric bursa injections on June 11 with Dr. Beltre.  She reports that the bursitis  area is significantly better.  She does feel like the osteopathic manipulation has helped significantly with her neck.  Her main complaint today is bilateral back and buttock pain thought to be from sacroiliac joint pain in etiology.  She is interested in repeating the sacroiliac joint injections that she has had good relief with those in the past.  Pain is located across the very low back, at the level of the SI joints and then radiating into the buttocks.  She denies any sciatica type pain going down the legs.  She does have pain in both knees, consistent with osteoarthritis.  She is rating her pain today at a 5 out of 10.  Her pain is worse with sitting, going up and down stairs, lifting, laying on her side.  She does feel better with injections, taking the aspirin and gabapentin as well as with osteopathic manipulation.  She is taking the gabapentin 300 mg at bedtime in addition to amitriptyline 25 mg at bedtime.  She also takes aspirin as needed.  She would like to continue with osteopathic manipulation today.    Past medical history is reviewed and is unchanged in the interim.    Family history is reviewed and is unchanged in the interim.    No significant change to social history.  She denies any tobacco or illicit drug use and rarely drinks alcohol.    Review of Systems: Positive for numbness and tingling in the hands and the feet, that is at baseline.  Positive for pain that makes it difficult to sleep at night..  Pertinent negatives include no fevers, chills, unexplained weight loss, bowel incontinence, bladder incontinence, trips, stumbles, falls.  All others reviewed and are negative.     Objective:   CONSTITUTIONAL:  Vital signs as above.  No acute distress.  The patient is well nourished and well groomed.    PSYCHIATRIC:  The patient is awake, alert, oriented to person, place and time.  The patient is answering questions appropriately with clear speech.  Normal affect.  SKIN:  Skin over the face,  posterior torso, bilateral upper and lower extremities is clean, dry, intact without rashes.  MUSCULOSKELETAL:  Gait is non-antalgic.  The patient is able to transfer independently.      OSTEOPATHIC STRUCTURAL EXAM:  Positive standing flexion test on the right.  Right inferior iliac crest.  Lumbar spine: L4-5 flex, rotated side side bent left  Sacrum: Unilateral sacral flexion  Innominates/Pelvis: Anterior/inferior right, right down slipped innominate.  Thoracic spine: T6-8 flex, rotated left side bent left  Rib cage: First rib elevated on the left.  Cervical spine: Right C4 tender point.  C7 flexed rotated left side bent right  Head/OA joint:Sidebent Right/ rotated left    OMT:  TREATMENTS IN PARENTHESES  Lumbar spine: L4-5 flex, rotated side side bent left (Myofascial release, patient prone).  Sacrum: Unilateral sacral flexion (initial tuberosities but with patient prone)  Innominates/Pelvis: Anterior/inferior right  (Muscle energy with the patient supine)., right down slipped innominate.  (Myofascial release, patient prone).  Thoracic spine: T6-8 flex, rotated left side bent left (Muscle energy, patient seated)  Rib cage: First rib elevated on the left.  (Myofascial release, patient supine)  Cervical spine: Right C4 tender point.  (Strain counterstrain with the patient supine, cervical spine flexed, side bent and rotated right) C7 flexed rotated left side bent right  (Muscle energy with the patient supine).  Head/OA joint:Sidebent Right/ rotated left (Myofascial release, patient supine)

## 2021-06-09 NOTE — PROGRESS NOTES
[facility] Certification Request    July 20, 2020      Patient: Holly Wood  MR Number: [id number]  YOB: 1950  Date of Visit: 7/20/2020      Dear Dr. Sylvia Young:    Thank you for this referral.   We are seeing Holly Wood for Physical Therapy of Bilateral Chronic Knee Pain.    Medicare and/or Medicaid requires physician review and approval of the treatment plan. Please review the plan of care and verify that you agree with the therapy plan of care by co-signing this note.      Plan of Care  Authorization / Certification Start Date: 07/20/20  Authorization / Certification End Date: 10/16/20  Authorization / Certification Number of Visits: 10  Communication with: Referral Source  Patient Related Instruction: Nature of Condition;Treatment plan and rationale;Self Care instruction;Basis of treatment;Body mechanics;Posture;Precautions;Expected outcome  Times per Week: 1x/wk or less  Number of Weeks: 12  Number of Visits: 8-10  Discharge Planning: Self management, Home exercises  Precautions / Restrictions : None known  Therapeutic Exercise: Stretching;Strengthening  Neuromuscular Reeducation: kinesio tape  Manual Therapy: soft tissue mobilization;strain counterstrain  Functional Training (ADL's): self care;ADL's  Equipment: theraband      Goals:  Pt. will be independent with home exercise program in : 12 weeks  Pt. will be able to walk : 20 minutes;with less pain;with less difficulty;for community mobility;for exercise/recreation;in 12 weeks;Comment  Comment:: with 0-3/10 knee pain    No data recorded      If you have any questions or concerns, please don't hesitate to call.    Sincerely,      Brea Olvera, PT        Physician recommendation:     ___ Follow therapist's recommendation        ___ Modify therapy      *Physician co-signature indicates they certify the need for these services furnished within this plan and while under their care.    [facility]  [facility]  Knee Initial Evaluation    Patient Name: Holly Wood  Date of evaluation: 7/20/2020  Referral Diagnosis: Bilateral chronic knee pain  Referring provider: Iram Vasquez*  Visit Diagnosis:     ICD-10-CM    1. Bilateral chronic knee pain  M25.561     M25.562     G89.29        Assessment:       Holly is a 70 year old female seen in [facility] on 7/17/20 for back pain, and now referred to PT for chronic bilateral knee pain 0f 25 years thought to be from OA. Knee pain is 7-8/10 and is worse with standing,stair climbing, lifting, and kneeling.  She is referred now with hopes of avoiding knee joint injections. Patient did have osteopathic manipulations of her back 7/17/20,  and SI Joint injections 7/2/20 which did help the pain. 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 Patient has always helped  in construction/general james for years, and did lots of drywall lifting, etc. She is sore in the right hip from lifting paint cans to the recycling center with grand daughter this past weekend. Exam shows slightly more limited left knee AROM with slight lateral tilt and glide of left patella and mildly enlarged medial joint line of the left knee- all consistent with OA and patellar dysfunction.  Pt. is a good candidate for skilled PT services to improve pain levels and function.        Goals:  Pt. will be independent with home exercise program in : 12 weeks  Pt. will be able to walk : 20 minutes;with less pain;with less difficulty;for community mobility;for exercise/recreation;in 12 weeks;Comment  Comment:: with 0-3/10 knee pain    No data recorded    Patient's expectations/goals are realistic.    Barriers to Learning or Achieving Goals:  Chronicity of the problem.    Goals and plan of care were set in collaboration with patient. Plan of care is dynamic and  will be modified on an ongoing basis.       Plan / Patient Instructions:      Plan of Care:   Authorization / Certification Start Date: 07/20/20  Authorization / Certification End Date: 10/16/20  Authorization / Certification Number of Visits: 10  Communication with: Referral Source  Patient Related Instruction: Nature of Condition;Treatment plan and rationale;Self Care instruction;Basis of treatment;Body mechanics;Posture;Precautions;Expected outcome  Times per Week: 1x/wk or less  Number of Weeks: 12  Number of Visits: 8-10  Discharge Planning: Self management, Home exercises  Precautions / Restrictions : None known  Therapeutic Exercise: Stretching;Strengthening  Neuromuscular Reeducation: kinesio tape  Manual Therapy: soft tissue mobilization;strain counterstrain  Functional Training (ADL's): self care;ADL's  Equipment: theraband      Plan for next visit: : Progress to weightbearing knee exercises if able, add hip strengthening.     Subjective:        Social information:   Living Situation:single family home, stairs  with railing and has assistance Yes Recently lost her , but kids live close. [place], has hired help for lawn and snow removal.    Occupation:retired,    Work Status:NA   Equipment Available: Grab rails all over the house    History of Present Illness:    Holly is a 70 y.o. female who presents to therapy today with complaints of bilateral knee pain. Date of onset/duration of symptoms is over 25 years ago, but worse since caregiving for her  the past 7 months. Onset was gradual. Symptoms are constant. She reports  a constant  history of similar symptoms. She describes their previous level of function as limited by back and knee pain. She does have numbness and tingling in both feet.    Past Medical History:   Diagnosis Date     Breast cyst      GERD (gastroesophageal reflux disease)      Insomnia     Obesity    Past Surgical History:   Procedure Laterality Date      hallax risidus surgery       HYSTERECTOMY  1991    Left great toe surgery    Pain Ratin  Pain rating at best: 6  Pain rating at worst: 9  Pain description: Burning, aching, sharp lateral knee pain    Functional limitations are described as occurring with:   Standing- 5 minutes  Walking- 5 minutes. Patient reports she was able to walk one mile 2019 to [place].  Stair climbing- has rambler, but lower level office and laundry.  Kneeling  Lifting  Sitting - 15 minutes- LBP  Squatting- unable  Sleeping position can cause pain but uses meds- Amytriptyline and Gabapentin. Is unable to lay on left hip due to left hip pain        Patient reports she has did PT multiple times - even at [facility], and has been to [facility]. Patient has had multiple spine, hip, and SI injections.       Objective:      Note: Items left blank indicates the item was not performed or not indicated at the time of the evaluation.    Patient Outcome Measures :    Lower Extremity Functional Scale (_/80): 22     Scores range from 0-80, where a score of 80 represents maximum function. The minimal clinically important difference is a positive change of 9 points.      Knee Examination  1. Bilateral chronic knee pain       Involved Side: Bilateral  Posture Observation:      Pelvis level  Assistive Device: None  Gait Observation: Patient has severe left leg limp and walks very slow with flexed trunk  Lumbar Clearing: Does not provoke symptoms  Hip Clearing: Does not provoke symptoms , patient reports she does have left hip pain today from lifting    Knee ROM:   Date:       Knee ROM ( ) AROM in degrees AROM in degrees AROM in degrees    Right Left Right Left Right Left   Knee Flexion (0-130 ) 120 82       Knee extension (0 ) 0 0        PROM in degrees PROM in degrees PROM in degrees    Right Left Right Left Right Left   Knee Flexion (0-130 )         Knee extension (0 )            Hip/Knee Strength     Date:      Hip/Knee Strength  (/5) MMT MMT MMT    Right Left Right Left Right Left   Hip Flexion         Hip Abduction         Hip Adduction         Hip Extension         Hip External Rotation         Hip Internal Rotation         Knee Extension 4 3+       Knee Flexion           Patient is unable to do sit to stand without use of her hands to assist    Palpation: Moderate left lateral tilt of the patella and lateral positioning in the joint    Knee Special Tests:     Meniscal Tests Right (+/-) Left (+/-) Ligament Tests Right (+/-) Left (+/-)   Karrie s   Lachman     Joint Line Tenderness  + Anterior Drawer     Thessaly   Posterior Drawer     Apley s   Posterior sag     Knee OA Right (+/-) Left (+/-) Valgus Stress     1. > 49 y/o  2. Stiffness > 30 minutes  3. Crepitus   4. Bony tenderness  5. Bone enlargement  6. No warmth to the touch   Varus Stress     Other Right (+/-) Left (+/-) Other     Ely s   Other     Eusebia              Treatment Today     TREATMENT MINUTES COMMENTS   Evaluation 30 Knees   Self-care/ Home management 15 Discussed use of a cane to level gait and prevent limp- suggested she use cane in the left hand to decrease right hip pain. Did instruct her in 2 and 3 pt gait and how to adjust the cane.   Manual therapy     Neuromuscular Re-education     Therapeutic Activity     Therapeutic Exercises 15 Reviewed some of her current exercises and added ITB stretch:  Exercises:  Exercise #1: Piriformis/Gluteal stretches- HEP  Comment #1: Abdpinal; set with SLR- 10 reps/leg daily  Exercise #2: ITB stretch in supine: 30 seconds daily  Comment #2: Bridge- 10 reps  Exercise #3: Side leg raise- bilaterally, 10 reps daily      Gait training     Modality__________________                Total 55    Blank areas are intentional and mean the treatment did not include these items.       PT Evaluation Code: (Please list factors)  Patient History/Comorbidities: Right hip pain, obesity  Examination: Bilateral knee pain  Clinical Presentation:  Stable  Clinical Decision Making: low    Patient History/  Comorbidities Examination  (body structures and functions, activity limitations, and/or participation restrictions) Clinical Presentation Clinical Decision Making (Complexity)   No documented Comorbidities or personal factors 1-2 Elements Stable and/or uncomplicated Low   1-2 documented comorbidities or personal factor 3 Elements Evolving clinical presentation with changing characteristics Moderate   3-4 documented comorbidities or personal factors 4 or more Unstable and unpredictable High             Brea Olvera  7/20/2020  4:00 PM

## 2021-06-09 NOTE — PATIENT INSTRUCTIONS - HE
Please take a multivitamin.    You should also take a Vitamin D3 supplement.  You can take 9829-2546 IUs per day or you can take 50,000 IUs once per month.    An order for physical therapy has been provided today.  Someone will call you to schedule physical therapy.  It will be very important for you to do your physical therapy exercises on a regular basis to decrease your pain and prevent future flares of pain.      Please rest today and drink plenty of water.      It is normal to have soreness following the treatment.  Please use ice over the sore areas.  You may take your usual pain medications.  Please call the clinic at [phone] or contact Dr. Gao via [employer] if the soreness is concerning to you.      Please follow-up in 4 weeks with Dr. Gao for in person OMT.

## 2021-06-09 NOTE — PROGRESS NOTES
Assessment:   Holly Wood (AKA Ama) is a 70 y.o. y.o. female with past medical history significant for ocular degeneration who presents today for follow-up regarding chronic bilateral low back pain thought to be from sacroiliac joint pain in etiology.  Patient also has chronic bilateral knee pain thought to be from primary osteoarthritis.  The patient's pain is also thought to have an osteopathic somatic dysfunction component.  She has multiple areas of osteopathic somatic dysfunction as listed below.  She remains without any red flag symptoms..      PSP:  Dr. Gao        Plan:     A shared decision making plan was used.  The patient's values and choices were respected.  The following represents what was discussed and decided upon by the physician and the patient.      1.  DIAGNOSTIC TESTS:  No further diagnostic tests are necessary at this time.    - The patient did have x-rays of the knees in May 2019 and x-rays of the pelvis/hips in July 2018.  2.  PHYSICAL THERAPY: The patient was encouraged to go to physical therapy for the knees, to see if injections could be avoided.  [facility]..    3.  MEDICATIONS:  No changes to the medications today.    -She can continue to take the gabapentin 300 - 600mg at bedtime.  -She can continue to take the Elavil 25 mg at bedtime.  -She can continue to take aspirin as needed for pain.-  4.  INTERVENTIONS:  Osteopathic manipulation was performed to 7 areas today.  The patient tolerated the treatment well and reported relief immediately afterwards..   Please see below for the osteopathic manipulation that was performed today.  -If she fails to have relief with the physical therapy, bilateral corticosteroid intra-articular knee joint injections could be performed by Dr. Gao.  -The patient is status bilateral sacroiliac joint injections on July 2 of 2020.  She can have repeat injections in the future if  needed.  Need to monitor the total amount of corticosteroid she receives, she does receive corticosteroid injections into other joints.  5.  PATIENT EDUCATION:    - The patient was advised to rest today and drink plenty of water.  Normal activities can be resumed as tolerated tomorrow.    - The patient was told that soreness following the treatment is normal and should improve within a few days.  Ice should be used (as opposed to heat) if soreness occurs.  If the soreness is concerning, the clinic should be notified so further instructions can be given.  - Addendum on 7-16-20 at 2:44 pm:  Patient told to take a multivitamin and Vitamin D3 (further instructions in AVS).  End Addendum.  6.  FOLLOW-UP: The patient is asked to follow-up in 4 weeks.. If there are any questions/concerns or any significant worsening of pain prior to that time, the patient is asked to call the clinic via the nurse navigation line or via [employer].     Subjective:     Holly Wood (AKA Ama)  is a 70 y.o. female who presents today for follow-up regarding chronic bilateral low back pain without radicular/sciatica type leg pain.  The patient is status post bilateral sacroiliac joint injections on July 2 of 2020.  She reports that she is having significant relief (75%) following these injections.  Her main complaint today is her bilateral knee pain.  Reports that she has had this knee pain for several years.  She describes it as a hot needle type sensation, mainly in the medial aspect of the knees and below the kneecap.  She is rating her pain today at a 7 out of 10.  At worst is an 8 out of 10.  At best it is a 7 out of 10.  Her pain is worse with standing, kneeling.  She does continue to have some back pain even with laying in bed.  Going up and down stairs significantly aggravates the knee pain.  Lifting aggravates the knee and the back pain.  She reports that the knee pain is not really better with any of the medications that she is  taking.  She is interested in pursuing further treatment for the knees.  She would like to continue with osteopathic manipulation to help with her other areas of pain, that remain under control with monthly manipulation treatments.    Patient is taking amitriptyline 25 mg at bedtime.  She takes aspirin 325 mg twice a day as needed for pain.  She takes gabapentin 300 mg at bedtime.    Past medical history is reviewed and is unchanged in the interim.    Family history is reviewed and is unchanged in the interim.    Review of Systems: Positive for numbness and tingling in the feet..  Pertinent negatives include no fevers, chills, unexplained weight loss, bowel incontinence, bladder incontinence, trips, stumbles, falls.  All others reviewed and are negative.     Objective:   CONSTITUTIONAL:  Vital signs as above.  No acute distress.  The patient is well nourished and well groomed.    PSYCHIATRIC:  The patient is awake, alert, oriented to person, place and time.  The patient is answering questions appropriately with clear speech.  Normal affect.  SKIN:  Skin over the face, posterior torso, bilateral upper and lower extremities is clean, dry, intact without rashes.  MUSCULOSKELETAL:  Gait is non-antalgic.  The patient is able to transfer independently.  No effusion of the bilateral knees.  No redness or erythema of the knees.    OSTEOPATHIC STRUCTURAL EXAM:  Negative standing flexion test.  Lumbar spine: L4-5 flex, rotated side side bent left  Sacrum: Unilateral sacral flexion  Innominates/Pelvis: Anterior/inferior right, posterior/superior left  Thoracic spine: T10-12 flexed, rotated/side bent right  Rib cage: First rib elevated on the left  Cervical spine: C6 flexed, rotated/side bent right  Head/OA joint: Side bent right/rotated left  Lower extremities: Decreased motion of the popliteal fossa fascia.    OMT:  TREATMENTS IN PARENTHESES  Lumbar spine: L4-5 flex, rotated side side bent left (Myofascial release, patient  prone).  Sacrum: Unilateral sacral flexion (Myofascial release, patient prone).  Innominates/Pelvis: Anterior/inferior right, posterior/superior left  (Muscle energy with the patient supine).  Thoracic spine: T10-12 flexed, rotated/side bent right (Myofascial release, patient prone).  Rib cage: First rib elevated on the left (Myofascial release, patient supine)  Cervical spine: C6 flexed, rotated/side bent right  (Muscle energy with the patient supine).  Head/OA joint: Side bent right/rotated left  (Muscle energy with the patient supine).  Lower extremities: Decreased motion of the popliteal fossa fascia.  (Myofascial release, patient supine)

## 2021-06-09 NOTE — PATIENT INSTRUCTIONS - HE
Follow-up visit with Dr. Gao in 2 weeks for OMT, to discuss injection outcome, and determine care plan going forward.       DISCHARGE INSTRUCTIONS    During office hours (8:00 a.m.- 4:00 p.m.) questions or concerns may be answered  by calling [facility] Navigation Nurses at  [phone].  Messages received after hours will be returned the following business day.      In the case of an emergency, please dial 911 or seek assistance at the nearest Emergency Room/Urgent Care facility.     All Patients:    ? You may experience an increase in your symptoms for the first 2 days (It may take anywhere between 2 days- 2 weeks for the steroid to have maximum effect).    ? You may use ice on the injection site, as frequently as 20 minutes each hour if needed.    ? You may take your pain medicine.    ? You may continue taking your regular medication after your injection. If you have had a Medial Branch Block you may resume pain medication once your pain diary is completed.    ? You may shower. No swimming, tub bath or hot tub for 48 hours.  You may remove your bandaid/bandage as soon as you are home.    ? You may resume light activities, as tolerated.    ? Resume your usual diet as tolerated.    ? It is strongly advised that you do not drive for 1-3 hours post injection.    ? If you have had oral sedation:  Do not drive for 8 hours post injection.      ? If you have had IV sedation:  Do not drive for 24 hours post injection.  Do not operate hazardous machinery or make important personal/business decisions for 24 hours.      POSSIBLE STEROID SIDE EFFECTS (If steroid/cortisone was used for your procedure)    -If you experience these symptoms, it should only last for a short period      Swelling of the legs                Skin redness (flushing)       Mouth (oral) irritation     Blood sugar (glucose) levels              Sweats                      Mood changes    Headache    Sleeplessness         POSSIBLE PROCEDURE SIDE  EFFECTS  -Call the Spine Center if you are concerned    Increased Pain             Increased numbness/tingling        Nausea/Vomiting            Bruising/bleeding at site        Redness or swelling                                                Difficulty walking        Weakness             Fever greater than 100.5    *In the event of a severe headache after an epidural steroid injection that is relieved by lying down, please call the [facility] to speak with a clinical staff member*

## 2021-06-09 NOTE — PROGRESS NOTES
[facility] Rehabilitation Daily Progress     Patient Name: Holly Wood  Date: 7/24/2020  Visit #: 2  Referral Diagnosis: Bilateral Chronic Knee Pain  Referring provider: Dr. MARLIN Young  Visit Diagnosis:     ICD-10-CM    1. Bilateral chronic knee pain  M25.561     M25.562     G89.29    2. Chronic midline low back pain without sciatica  M54.5     G89.29    3. Chronic hip pain, left  M25.552     G89.29    4. Generalized muscle weakness  M62.81      From 7/20/20 eval:  Holly is a 70 year old female seen [facility]20 for back pain, and now referred to PT for chronic bilateral knee pain 0f 25 years thought to be from OA. Knee pain is 7-8/10 and is worse with standing,stair climbing, lifting, and kneeling.  She is referred now with hopes of avoiding knee joint injections. Patient did have osteopathic manipulations of her back 7/17/20,  and SI Joint injections 7/2/20 which did help the pain. 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 Patient has always helped  in construction/general james for years, and did lots of drywall lifting, etc. She is sore in the right hip from lifting paint cans to the recycling center with grand daughter this past weekend. Exam shows slightly more limited left knee AROM with slight lateral tilt and glide of left patella and mildly enlarged medial joint line of the left knee- all consistent with OA and patellar dysfunction    Assessment:     Burning pain in anterior knees at rest. Still has right gluteal and hamstring pain with + right Trendelenburg during gait. Still stressed pacing during gardening.  Patient demonstrates understanding/independence with home program.  Patient is benefitting from skilled physical therapy and is making steady progress toward functional goals.     Goal Status:  Pt. will be independent with home exercise  program in : 12 weeks  Pt. will be able to walk : 20 minutes;with less pain;with less difficulty;for community mobility;for exercise/recreation;in 12 weeks;Comment  Comment:: with 0-3/10 knee pain    No data recorded    Plan / Patient Education:     Continue with initial plan of care.  Add MT to decrease knee and hip pain    Subjective:     Pain Ratin knee pain sitting down, 2/10 knee pain sitting down. Right ischial tuberosity pain down the hamstring, with pain into the 2nd toe- tingling.  I usually feel this tingling  in the 2nd toe when I am in my recliner and also when I lay on my right side in bed. This is the side that I popped the muscle taking care of Juan. The back of my right leg is better. When I sit in my recliner the front of the knees start to burn- this has been going on for years.      Objective:     Defect noted in right mid gluteal area  R hip abduction 3-/5  Cranial scan shows marked R hip pelvis LV fascial dysfunction    Treatment Today     TREATMENT MINUTES COMMENTS   Evaluation     Self-care/ Home management     Manual therapy 10 Supine MT/SCS to right leg including R POP-A, R MSG and MARISEL-A   Neuromuscular Re-education     Therapeutic Activity     Therapeutic Exercises 45 Added and performed clamshell for hips- side leg raise reviewed but not issued due to weakness that was profound and substitution by LB. Also added seated quad isometrics which patient did not have pain with.  Suggested sleep positioning while do exercises.  Exercises:  Exercise #1: Piriformis/Gluteal stretches- HEP  Comment #1: Abdominal; set with SLR- 10 reps/leg daily  Exercise #2: ITB stretch in supine: 30 seconds daily  Comment #2: Bridge- 10 reps- hurts in gluteal- hold  Exercise #3: Side leg raise or clamshell  bilaterally, 5-10 reps daily  Comment #3: Seated gluteal and quad isometric- 5 seconds, 10-20 reps daily      Gait training     Modality__________________                Total 55    Blank areas are  intentional and mean the treatment did not include these items.       Brea Olvera  7/24/2020

## 2021-06-09 NOTE — PATIENT INSTRUCTIONS - HE
Right and left sacroiliac joint injections have been ordered today.  Please schedule this injection on or after June 30 to allow time for insurance prior authorization.  On the day of your injection, you cannot be sick or taking antibiotics.  If you become sick and are prescribed, please call the clinic so your injection can be rescheduled for once you have completed your antibiotics.  You will need to bring a  with you for your injection, but your  will have to wait in the car.  You will need to wear a mask into your appointments.   If you have any questions or concerns prior to your injection, please do not hesitate to call the nurse navigation line at [phone] or contact Dr. Gao through [email or web address].

## 2021-06-10 NOTE — PROGRESS NOTES
[facility] Rehabilitation Daily Progress     Patient Name: Holly Wood  Date: 8/7/2020  Visit #: 4  Referral Diagnosis: Bilateral Chronic Knee Pain  Referring provider: Dr. MARLIN Young  Visit Diagnosis:     ICD-10-CM    1. Bilateral chronic knee pain  M25.561     M25.562     G89.29    2. Chronic midline low back pain without sciatica  M54.5     G89.29    3. Chronic hip pain, left  M25.552     G89.29    4. Generalized muscle weakness  M62.81      From 7/20/20 eval:  Holly is a 70 year old female seen in [facility] on 7/17/20 for back pain, and now referred to PT for chronic bilateral knee pain 0f 25 years thought to be from OA. Knee pain is 7-8/10 and is worse with standing,stair climbing, lifting, and kneeling.  She is referred now with hopes of avoiding knee joint injections. Patient did have osteopathic manipulations of her back 7/17/20,  and SI Joint injections 7/2/20 which did help the pain. 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 Patient has always helped  in construction/general james for years, and did lots of drywall lifting, etc. She is sore in the right hip from lifting paint cans to the recycling center with grand daughter this past weekend. Exam shows slightly more limited left knee AROM with slight lateral tilt and glide of left patella and mildly enlarged medial joint line of the left knee- all consistent with OA and patellar dysfunction    Assessment:     Patient walking with severe right limp first few steps after sitting- no cane.  Patient demonstrates understanding/independence with home program.  Patient is benefitting from skilled physical therapy and is making steady progress toward functional goals.     Goal Status:  Pt. will be independent with home exercise program in : 12 weeks  Pt. will be able to walk : 20 minutes;with less pain;with less  difficulty;for community mobility;for exercise/recreation;in 12 weeks;Comment  Comment:: with 0-3/10 knee pain    No data recorded    Plan / Patient Education:     Continue with initial plan of care.  Continue MT to decrease knee and hip pain- check PUD-, IGL-,     Subjective:     Pain Ratin knee pain sitting down, 2/10 knee pain sitting down.    The lateral knee pain has gotten much better. I was able to stand to fold clothes with a little pain in the knees.  I was able to climb stairs like a normal person. Pain is in the right proximal hamstring and posterior hip  No pain down the right hamstring, and I have no tingling into the 2nd toe.    Objective:     R hip abduction 3-/5  Marked tightness and tenderness right posterior trochanter area    Treatment Today     TREATMENT MINUTES COMMENTS   Evaluation     Self-care/ Home management     Manual therapy 30 Side lie MT/SCS to right leg and gluteal Including : R Pelv-LV, R SGL and IGL-N, R MPERF-LV, R LGAS-N   Neuromuscular Re-education     Therapeutic Activity     Therapeutic Exercises      Gait training     Modality__________________                Total 30    Blank areas are intentional and mean the treatment did not include these items.       Brea Olvera  2020

## 2021-06-10 NOTE — PROGRESS NOTES
Assessment:   Holly Wood (AKA Ama)  is a 70 y.o. y.o. female with past medical history significant for macular degeneration who presents today for follow-up regarding chronic right gluteal/buttock pain thought to be from right ischial tuberosity bursitis (bursitis of the pelvis).  The patient reports that she has stable neck pain and low back pain at this time.  Her pain can low back pain are  thought to be due to osteopathic somatic dysfunction.  She does have multiple areas of osteopathic somatic dysfunction as listed below.  She reports that she received excellent relief following the last osteopathic manipulation treatment on July 16 of 2020.  She is without any red flag symptoms..      PSP:  Dr. Gao        Plan:     A shared decision making plan was used.  The patient's values and choices were respected.  The following represents what was discussed and decided upon by the physician and the patient.      1.  DIAGNOSTIC TESTS:  No further diagnostic tests are necessary at this time.    -Patient did have x-rays of her knees in May 2019 and x-rays of the pelvis/hips in July 2018.  2.  PHYSICAL THERAPY: At her last visit, the patient was given an order for physical therapy.  He is currently enrolled in physical therapy and she reports that this is going well.  She is very diligent in doing her home exercise program.  She would like to continue going to physical therapy.  Dr. Gao told her to ask her physical therapist to send over an order with the number of visits that they would like to continue with and Dr. Gao would be happy to sign the order.  3.  MEDICATIONS:  No changes to the medications today.    -Continue to take gabapentin 300 to 600 mg at bedtime.  -She can continue to take Elavil 25 mg at bedtime.  -Can continue to take aspirin as needed for pain.  4.  INTERVENTIONS:  Osteopathic manipulation was performed to 7 areas today.  The patient tolerated the treatment well and she reported  relief immediately afterwards.   Please see below for the osteopathic manipulation that was performed today.  5.  PATIENT EDUCATION:    - The patient was advised to rest today and drink plenty of water.  Normal activities can be resumed as tolerated tomorrow.    - The patient was told that soreness following the treatment is normal and should improve within a few days.  Ice should be used (as opposed to heat) if soreness occurs.  If the soreness is concerning, the clinic should be notified so further instructions can be given.  -Encouraged the patient to try to get 30 minutes of low impact aerobic exercise every day.  She was also encouraged to try to increase her fruit and vegetable intake but otherwise not stress about making big changes in her diet.  She should not worry about the weight on the scale.  6.  FOLLOW-UP: The patient is asked to follow-up 4 weeks. If there are any questions/concerns or any significant worsening of pain prior to that time, the patient is asked to call the clinic via the nurse navigation line or via [employer].     Subjective:     Holly ISAAC Israel (AKA Ama) is a 70 y.o. female who presents today for follow-up regarding chronic right-sided buttock pain thought to be from ischial tuberosity bursitis.  She is stable with regards to her neck pain and low back pain.  She feels that the osteopathic manipulation is providing significant relief and she would like to continue with that treatment today.  She has been going to physical therapy and she reports that the physical therapy has also been providing significant relief.  She is feeling the best that she has in a while at this time.  Mostly pain concentrates in the right buttock, but she states that she knows this is from the ischial tuberosity bursitis.  She rates her pain today at a 1 out of 10.  At worst it is an 8 out of 10.  At best it is a 1 out of 10.  Her pain is worse with lifting, walking, certain positions and with standing for  long periods of time.  She does feel better with the cortisone injections that she has had.  Physical therapy and her medications do seem to help significantly as well.  She is managing with aspirin and gabapentin.  She would like to extend her physical therapy visits if at all possible as she feels that she is making good progress.  She has no other questions or concerns today.    Past medical history is reviewed and is unchanged in the interim.    Family history is reviewed and is unchanged in the interim.    Review of Systems:.  Pertinent negatives include no fevers, chills, unexplained weight loss, bowel incontinence, bladder incontinence, trips, stumbles, falls.  All others reviewed and are negative.     Objective:   CONSTITUTIONAL:  Vital signs as above.  No acute distress.  The patient is well nourished and well groomed.    PSYCHIATRIC:  The patient is awake, alert, oriented to person, place and time.  The patient is answering questions appropriately with clear speech.  Normal affect.  SKIN:  Skin over the face, posterior torso, bilateral upper and lower extremities is clean, dry, intact without rashes.  MUSCULOSKELETAL:  Gait is non-antalgic.  The patient is able to transfer independently.      OSTEOPATHIC STRUCTURAL EXAM:  Negative standing flexion test.  Symmetric iliac crest.  Lumbar spine: L2-3 flex, rotated/side bent left  Sacrum: Right unilateral sacral flexion  Innominates/Pelvis: Anterior/inferior right, posterior/superior left  Thoracic spine: T12 flexed, rotated/side bent left  Rib cage: First rib elevated on the left  Cervical spine: C2-3 flex, rotated/side bent left  Head/OA joint: Sidebent right/rotated left    OMT:  TREATMENTS IN PARENTHESES  Lumbar spine: L2-3 flex, rotated/side bent left  (Muscle energy, patient in lateral recumbent)  Sacrum: Right unilateral sacral flexion (Myofascial release, patient prone).  Innominates/Pelvis: Anterior/inferior right, posterior/superior left  (Muscle  energy with the patient supine).  Thoracic spine: T12 flexed, rotated/side bent left (Muscle energy, patient seated)  Rib cage: First rib elevated on the left (Myofascial release, patient supine)  Cervical spine: C2-3 flex, rotated/side bent left  (Muscle energy with the patient supine).  Head/OA joint: Sidebent right/rotated left  (Muscle energy with the patient supine).

## 2021-06-10 NOTE — PROGRESS NOTES
[facility] Rehabilitation Daily Progress     Patient Name: Holly Wood  Date: 7/31/2020  Visit #: 3  Referral Diagnosis: Bilateral Chronic Knee Pain  Referring provider: Dr. MARLIN Young  Visit Diagnosis:     ICD-10-CM    1. Bilateral chronic knee pain  M25.561     M25.562     G89.29    2. Chronic midline low back pain without sciatica  M54.5     G89.29    3. Chronic hip pain, left  M25.552     G89.29    4. Generalized muscle weakness  M62.81      From 7/20/20 eval:  Holly is a 70 year old female seen in [facility] on 7/17/20 for back pain, and now referred to PT for chronic bilateral knee pain 0f 25 years thought to be from OA. Knee pain is 7-8/10 and is worse with standing,stair climbing, lifting, and kneeling.  She is referred now with hopes of avoiding knee joint injections. Patient did have osteopathic manipulations of her back 7/17/20,  and SI Joint injections 7/2/20 which did help the pain. 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 Patient has always helped  in construction/general james for years, and did lots of drywall lifting, etc. She is sore in the right hip from lifting paint cans to the recycling center with grand daughter this past weekend. Exam shows slightly more limited left knee AROM with slight lateral tilt and glide of left patella and mildly enlarged medial joint line of the left knee- all consistent with OA and patellar dysfunction    Assessment:     Patient walking with slightly less antalgia,no cane.  Patient demonstrates understanding/independence with home program.  Patient is benefitting from skilled physical therapy and is making steady progress toward functional goals.     Goal Status:  Pt. will be independent with home exercise program in : 12 weeks  Pt. will be able to walk : 20 minutes;with less pain;with less difficulty;for community  mobility;for exercise/recreation;in 12 weeks;Comment  Comment:: with 0-3/10 knee pain    No data recorded    Plan / Patient Education:     Continue with initial plan of care.  Continue MT to decrease knee and hip pain- check PUD-, IGL-, LPERF-    Subjective:     Pain Ratin knee pain sitting down, 2/10 knee pain sitting down.   I went to [employer] today and using the shopping cart really helps. The treatment last time really helped the calves. I could not do the leg lift, but I can do the clamshell. It still really hurts around the right ischial tuberosity. No pain down the right hamstring, and I have no tingling into the 2nd toe.    Objective:     R hip abduction 3-/5  Cranial scan shows marked R hip pelvis LV fascial dysfunction    Treatment Today     TREATMENT MINUTES COMMENTS   Evaluation     Self-care/ Home management     Manual therapy 55 Supine MT/SCS to right leg, chest and abdomen  Including : R Psoas-LV, R ALFC-LV, R TLFC-LV, R INOM-LV, R PORT-LV, R CAROLYN-LV, scar mob right Lower quadrant, R EPUD-A, stacked EPIL1-5-LV,    Neuromuscular Re-education     Therapeutic Activity     Therapeutic Exercises      Gait training     Modality__________________                Total 55    Blank areas are intentional and mean the treatment did not include these items.       Brea Olvera  2020

## 2021-06-10 NOTE — PROGRESS NOTES
[facility] Rehabilitation Daily Progress     Patient Name: Holly Wood  Date: 8/18/2020  Visit #: 5  Referral Diagnosis: Bilateral Chronic Knee Pain  Referring provider: Dr. MARLIN Young  Visit Diagnosis:     ICD-10-CM    1. Bilateral chronic knee pain  M25.561     M25.562     G89.29    2. Chronic midline low back pain without sciatica  M54.5     G89.29    3. Chronic hip pain, left  M25.552     G89.29    4. Generalized muscle weakness  M62.81      From 7/20/20 eval:  Holly is a 70 year old female seen in [facility] on 7/17/20 for back pain, and now referred to PT for chronic bilateral knee pain 0f 25 years thought to be from OA. Knee pain is 7-8/10 and is worse with standing,stair climbing, lifting, and kneeling.  She is referred now with hopes of avoiding knee joint injections. Patient did have osteopathic manipulations of her back 7/17/20,  and SI Joint injections 7/2/20 which did help the pain. 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 Patient has always helped  in construction/general james for years, and did lots of drywall lifting, etc. She is sore in the right hip from lifting paint cans to the recycling center with grand daughter this past weekend. Exam shows slightly more limited left knee AROM with slight lateral tilt and glide of left patella and mildly enlarged medial joint line of the left knee- all consistent with OA and patellar dysfunction    Assessment:     Patient walking with severe right limp first few steps after sitting- no cane.  Patient demonstrates understanding/independence with home program.  Patient is benefitting from skilled physical therapy and is making steady progress toward functional goals.     Goal Status:  Pt. will be independent with home exercise program in : 12 weeks  Pt. will be able to walk : 20 minutes;with less pain;with  less difficulty;for community mobility;for exercise/recreation;in 12 weeks;Comment  Comment:: with 0-3/10 knee pain    No data recorded    Plan / Patient Education:     Continue with initial plan of care. See pt 1 more time this weel, then recheck in 3 weeks  Continue MT to decrease knee and hip pain- check PUD-, IGL-,     Subjective:     Pain Ratin knee pain sitting down, 2/10 knee pain sitting down.  Standing and lifting can still be painful in the knees. Mornings are good and I can walk with 2/10 pain. I notice fatigue in my legs at the end of the day, and I am trying to use my arms less to assist rising from the chair. I still get burning in the knees for 30 minutes in my recliner, but I havne;t had to use ice for the past 2 weeks.  I was able to climb stairs like a normal person. Pain is in the right proximal hamstring and posterior hip  No pain down the right hamstring, and I have no tingling into the 2nd toe.    Objective:     R hip abduction 4/5  Marked tightness and tenderness right posterior trochanter area  Patient has a very hard time finding T. Abdominal instead of just splinting, but did very well with correction. Abdominal strength is 4/5 bilat.      Treatment Today     TREATMENT MINUTES COMMENTS   Evaluation     Self-care/ Home management     Manual therapy     Neuromuscular Re-education     Therapeutic Activity     Therapeutic Exercises 55  Reviewed and performed all exercises. Refined abdominal sets with straight leg raise and trunk rotation to achieve greater effect.  Exercises:  Exercise #1: Piriformis/Gluteal stretches- HEP. Also doing doing neck stretch, hp flexor stretch, greeen band row, corner stretch, roation stretch  Comment #1: Abdominal; set with SLR- 5-100 reps/leg daily  Exercise #2: ITB stretch in supine: 30 seconds daily  Comment #2: Bridge- 10 reps-3x/wk  Exercise #3: Side leg raise / clamshell  bilaterally, 5-10 reps daily- 3x/wk  Comment #3: Seated gluteal and quad  isometric- 5 seconds, 10-20 reps daily      Gait training     Modality__________________                Total 55    Blank areas are intentional and mean the treatment did not include these items.       Brea Olvera  8/18/2020

## 2021-06-10 NOTE — PROGRESS NOTES
[facility] Rehabilitation Daily Progress     Patient Name: Holly Wood  Date: 7/29/2020  Visit #: 3  Referral Diagnosis: Bilateral Chronic Knee Pain  Referring provider: Dr. MARLIN Young  Visit Diagnosis:     ICD-10-CM    1. Bilateral chronic knee pain  M25.561     M25.562     G89.29    2. Chronic midline low back pain without sciatica  M54.5     G89.29    3. Chronic hip pain, left  M25.552     G89.29    4. Generalized muscle weakness  M62.81      From 7/20/20 eval:  Holly is a 70 year old female seen [facility]20 for back pain, and now referred to PT for chronic bilateral knee pain 0f 25 years thought to be from OA. Knee pain is 7-8/10 and is worse with standing,stair climbing, lifting, and kneeling.  She is referred now with hopes of avoiding knee joint injections. Patient did have osteopathic manipulations of her back 7/17/20,  and SI Joint injections 7/2/20 which did help the pain. 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 Patient has always helped  in construction/general james for years, and did lots of drywall lifting, etc. She is sore in the right hip from lifting paint cans to the recycling center with grand daughter this past weekend. Exam shows slightly more limited left knee AROM with slight lateral tilt and glide of left patella and mildly enlarged medial joint line of the left knee- all consistent with OA and patellar dysfunction    Assessment:     Burning pain in anterior knees at rest. Still has right gluteal and hamstring pain with + right Trendelenburg during gait. Still stressed pacing during gardening.  Patient demonstrates understanding/independence with home program.  Patient is benefitting from skilled physical therapy and is making steady progress toward functional goals.     Goal Status:  Pt. will be independent with home exercise  program in : 12 weeks  Pt. will be able to walk : 20 minutes;with less pain;with less difficulty;for community mobility;for exercise/recreation;in 12 weeks;Comment  Comment:: with 0-3/10 knee pain    No data recorded    Plan / Patient Education:     Continue with initial plan of care.  Add MT to decrease knee and hip pain    Subjective:     Pain Rating: 3 knee pain sitting down, 6/10 knee pain walking, but 8/10 in knee. Right ischial tuberosity pain down the hamstring, and tingling into the right anterolateral calf. Tingling in the right 2nd toe is gone.  I do a lot of stairs and a lot of lifting and bending- I live [place] and bedroom is on the upper level. I have my name on a senior apartment.      Objective:     Marked right gluteal spasm, right peroneal spasm  R hip abduction 3-/5  Cranial scan shows marked R hip pelvis LV fascial dysfunction    Treatment Today     TREATMENT MINUTES COMMENTS   Evaluation     Self-care/ Home management Nc 10  Adjusted cane and discussed 2 pt gait   Manual therapy 45 Prone MT/SCS to bilat back and gluteals, and right leg including: right sciatic pathway, stacked S1-2-N, Bilat DSCI-N, R :LSUR-N,   Neuromuscular Re-education     Therapeutic Activity     Therapeutic Exercises        Gait training     Modality__________________                Total 55    Blank areas are intentional and mean the treatment did not include these items.       Brea Olvera  7/29/2020

## 2021-06-10 NOTE — PROGRESS NOTES
[facility] Rehabilitation Daily Progress     Patient Name: Holly Wood  Date: 8/21/2020  Visit #: 5  Referral Diagnosis: Bilateral Chronic Knee Pain  Referring provider: Dr. MARLIN Young  Visit Diagnosis:     ICD-10-CM    1. Bilateral chronic knee pain  M25.561     M25.562     G89.29    2. Chronic midline low back pain without sciatica  M54.5     G89.29    3. Chronic hip pain, left  M25.552     G89.29    4. Generalized muscle weakness  M62.81      From 7/20/20 eval:  Holly is a 70 year old female seen in [facility] on 7/17/20 for back pain, and now referred to PT for chronic bilateral knee pain 0f 25 years thought to be from OA. Knee pain is 7-8/10 and is worse with standing,stair climbing, lifting, and kneeling.  She is referred now with hopes of avoiding knee joint injections. Patient did have osteopathic manipulations of her back 7/17/20,  and SI Joint injections 7/2/20 which did help the pain. 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 Patient has always helped  in construction/general james for years, and did lots of drywall lifting, etc. She is sore in the right hip from lifting paint cans to the recycling center with grand daughter this past weekend. Exam shows slightly more limited left knee AROM with slight lateral tilt and glide of left patella and mildly enlarged medial joint line of the left knee- all consistent with OA and patellar dysfunction    Assessment:     Patient walking with less right limp, but does have definite right gluteal weakness and hip drop. She may have torn a gluteal muscle as she remembers when caring for  an episode while lifting his legs into bed a pop with immediate right buttock pain- unable to sit for several months.  Patient demonstrates understanding/independence with home program.  Patient is benefitting from skilled  physical therapy and is making steady progress toward functional goals.     Goal Status:  Pt. will be independent with home exercise program in : 12 weeks  Pt. will be able to walk : 20 minutes;with less pain;with less difficulty;for community mobility;for exercise/recreation;in 12 weeks;Comment  Comment:: with 0-3/10 knee pain    No data recorded    Plan / Patient Education:     Continue with initial plan of care. Recheck pt  in 3 weeks - do 6 minute walk test, SL stand and heel raise, sit to stand  Continue MT to decrease knee and hip pain    Subjective:     Pain Ratin knee pain sitting down, 2/10 knee pain sitting down. I am moving much better and am amazed that I can walk so much better. It still hurts in my right buttock and knees when I walk and on stairs.I can stand up straight now!  Still burning in the knees on the stairs and when I sit in the recliner ( but they have for 25 years).  Gabapentin has really helped with sleeping- able to sleep through the night and is taking less aspirin.    Objective:     Unable to perform SL stand on the right leg due to left hip drop and right hip pain (+ Right Trendelenburg)  R hip abduction 4/5  0 reps SL heel raise- 3-/5 calf strength.  Marked tightness and tenderness right posterior trochanter area  Patient has a very hard time finding T. Abdominal instead of just splinting, but did very well with correction. Abdominal strength is 4/5 bilat.      Treatment Today     TREATMENT MINUTES COMMENTS   Evaluation     Self-care/ Home management     Manual therapy 15 Supine STM/SCS to right glut medius, MCLUN-N, R DSCI-N, R ILLU-A, R PUD-A   Neuromuscular Re-education     Therapeutic Activity     Therapeutic Exercises 45  Added more aggressive quad strengthening and added gastroc strengthening. Pt unable to add SL balance due to right buttock pain.  Exercises:  Exercise #1: Piriformis/Gluteal stretches- HEP. Also doing doing neck stretch, hp flexor stretch, greeen band row,  corner stretch, roation stretch  Comment #1: Abdominal; set with SLR- 5-10 reps/leg daily  Exercise #2: ITB stretch in supine: 30 seconds daily  Comment #2: Bridge- 10 reps-3x/wk  Exercise #3: Side leg raise / clamshell  bilaterally, 5-10 reps daily- 3x/wk  Comment #3: Seated gluteal and quad isometric- 5 seconds, 10-20 reps daily  Exercise #4: Double heel raises- 5-10 reps- 2x/day  Comment #4: MInisquats at counter- 5-10 reps, 2x/day         Gait training     Modality__________________                Total 60    Blank areas are intentional and mean the treatment did not include these items.       Brea Olvera  8/21/2020

## 2021-06-10 NOTE — PATIENT INSTRUCTIONS - HE
"Go to [employer] website.  Search for \"occipital release tool.\"      Please rest today and drink plenty of water.      It is normal to have soreness following the treatment.  Please use ice over the sore areas.  You may take your usual pain medications.  Please call the clinic at [phone] or contact Dr. Gao via [employer] if the soreness is concerning to you.      Please follow-up in 4 weeks.        "

## 2021-06-11 NOTE — PROGRESS NOTES
[facility] Rehabilitation Daily Progress/Monthly Summary     Patient Name: Holly Wood  Date: 9/16/2020  Visit #: 6  Referral Diagnosis: Bilateral Chronic Knee Pain  Referring provider: Dr. MARLIN Young  Visit Diagnosis:     ICD-10-CM    1. Bilateral chronic knee pain  M25.561     M25.562     G89.29    2. Chronic midline low back pain without sciatica  M54.5     G89.29    3. Chronic hip pain, left  M25.552     G89.29    4. Generalized muscle weakness  M62.81      From 7/20/20 eval:  Holly is a 70 year old female seen in [facility] on 7/17/20 for back pain, and now referred to PT for chronic bilateral knee pain 0f 25 years thought to be from OA. Knee pain is 7-8/10 and is worse with standing,stair climbing, lifting, and kneeling.  She is referred now with hopes of avoiding knee joint injections. Patient did have osteopathic manipulations of her back 7/17/20,  and SI Joint injections 7/2/20 which did help the pain. 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 Patient has always helped  in construction/general james for years, and did lots of drywall lifting, etc. She is sore in the right hip from lifting paint cans to the recycling center with grand daughter this past weekend. Exam shows slightly more limited left knee AROM with slight lateral tilt and glide of left patella and mildly enlarged medial joint line of the left knee- all consistent with OA and patellar dysfunction    Assessment:     Patient walking with less right limp, but does have definite right gluteal weakness and hip drop. She still gets burning pain in the lateral knee area with stairs and at end of the day. She is overall much less painful in the knees and walking better. Right hanstring and gluteal pain is resolved after treatment last time. Testing today shows severe LE strength and endurance  losses.  Patient demonstrates understanding/independence with home program.  Patient is benefitting from skilled physical therapy and is making steady progress toward functional goals.     Goal Status:  Pt. will be independent with home exercise program in : 12 weeks  Pt. will be able to walk : 20 minutes;with less pain;with less difficulty;for community mobility;for exercise/recreation;in 12 weeks;Comment  Comment:: with 0-3/10 knee pain    No data recorded    Plan / Patient Education:     Continue with initial plan of care. Recheck pt  in 3 weeks - do 6 minute walk test, SL stand and heel raise, sit to stand    Subjective:     Pain Ratin-1 knee pain sitting down, at the end of the day and on stairs I still have sharp and burning pain in the outside of both knees. My right hamstring is so much better after the treatment, and the exercises helped. I never did call the surgeon!  I do get cramping in both toes when I exercise.     I am moving much better and am amazed that I can walk so much better. It still hurts in my right buttock and knees when I walk and on stairs.I can stand up straight now!  Still burning in the knees on the stairs and when I sit in the recliner ( but they have for 25 years).  Gabapentin has really helped with sleeping- able to sleep through the night and is taking less aspirin.    Objective:     Unable to perform SL stand on the right leg due to left hip drop and right hip pain (+ Right Trendelenburg)  R hip abduction 4-/5  1 reps SL heel raise- 3/5 calf strength.  Resting HR 76 bpm, resting O2 is 97%., post exercise HR 91 bpm, resting O2 is 96%  6 minute walk test  todayis 333.3 yards, which is less than 5% for her age and sex  Sit to stand test is 8 1/2 reps in 30 sec from 18 inch seat height      Treatment Today     TREATMENT MINUTES COMMENTS   Evaluation     Self-care/ Home management     Manual therapy     Neuromuscular Re-education     Therapeutic Activity     Therapeutic Exercises  55 Discussed use of Potassium and magnesium to decrease toes cramping.  Exercises:  Exercise #1: Piriformis/Gluteal stretches- HEP. Also doing doing neck stretch, hp flexor stretch, greeen band row, corner stretch, roation stretch  Comment #1: Abdominal; set with SLR- 5-10 reps/leg daily  Exercise #2: ITB stretch in supine: 30 seconds daily  Comment #2: Bridge- 10 reps-3x/wk  Exercise #3: Side leg raise / clamshell  bilaterally, 5-10 reps daily- 3x/wk  Comment #3: Seated gluteal and quad isometric- 5 seconds, 10-20 reps daily  Exercise #4: Double heel raises- 5-10 reps- 2x/day  Comment #4: MInisquats at counter- 5-10 reps, 2x/day         Gait training     Modality__________________                Total 55    Blank areas are intentional and mean the treatment did not include these items.       Brea Olvera  9/16/2020

## 2021-06-11 NOTE — PATIENT INSTRUCTIONS - HE
Please rest today and drink plenty of water.      It is normal to have soreness following the treatment.  Please use ice over the sore areas.  You may take your usual pain medications.  Please call the clinic at [phone] or contact Dr. Gao via [employer] if the soreness is concerning to you.      Please follow-up in 4 weeks.

## 2021-06-11 NOTE — PROGRESS NOTES
Assessment:   Holly Wood (AKKALPANA Serrato)  is a 70 y.o. y.o. female with past medical history significant for macular deegeneration who presents today for follow-up regarding chronic midline low back pain without sciatica.  She reports that her neck pain continues to be stable.  Her pain is thought to be related to osteopathic somatic dysfunction and she continues to have multiple areas of osteopathic somatic dysfunction as listed below.  She did have significant relief following the last osteopathic manipulation treatment on August 12 of 2020.  She is without any red flag symptoms. .      PSP:  Dr. Gao (transferred from [facility])       Plan:     A shared decision making plan was used.  The patient's values and choices were respected.  The following represents what was discussed and decided upon by the physician and the patient.      1.  DIAGNOSTIC TESTS:  No further diagnostic tests are necessary at this time.  - Patient last had xrays of her knees in May of 2019 and xrays of the pelvis/hips in July 2018    2.  PHYSICAL THERAPY: She is currently enrolled in physical therapy and she is encouraged to complete her course as ordered.  She is encouraged to continue doing the home exercise program daily.  If she would like more visits of physical therapy, she is encouraged to discuss this with her physical therapist and that her physical therapist can send over an order for more visits and Dr. Gao will happily sign.  3.  MEDICATIONS:  No changes to the medications today.    - she can continue to take gabapentin 300 to 600 mg at bedtime.  -She can continue to take Elavil 25 mg at bedtime.  -She can continue to take aspirin as needed for pain.  4.  INTERVENTIONS:  Osteopathic manipulation was performed to 7 areas today.  The patient tolerated the treatment well and she reported relief immediately afterwards.   Please see below for the osteopathic manipulation that was performed today.  5.  PATIENT EDUCATION:     - The patient was advised to rest today and drink plenty of water.  Normal activities can be resumed as tolerated tomorrow.    - The patient was told that soreness following the treatment is normal and should improve within a few days.  Ice should be used (as opposed to heat) if soreness occurs.  If the soreness is concerning, the clinic should be notified so further instructions can be given.  6.  FOLLOW-UP: The patient is asked to follow-up in approximately 4 weeks. If there are any questions/concerns or any significant worsening of pain prior to that time, the patient is asked to call the clinic via the nurse navigation line or via [employer].     Subjective:     Holly Wood (AKKALPANA Serrato) is a 70 y.o. female who presents today for follow-up regarding chronic midline low back pain without radicular/sciatic type leg symptoms.  Since her last treatment on August 12 of 2020, she reports that she is doing quite well.  She reports that this is the best that she has felt in a long time.  She does have some pain in the very central part of her low back.  She denies any radiation of pain going down into the legs.  She denies any numbness tingling or weakness in the legs.  She does get some burning pain in the knees occasionally.  Reports that overall this is good and that the burning pain in the knees is intermittent.  She overall rates her pain today at a 1 out of 10.  At worst it is a 4 out of 10.  At best it is a 1 out of 10.  Her pain is worse with lifting, going up and down stairs, laying on her left side for prolonged periods of time, over exercising or walking too much.  She does feel better in general with movement.  Her medications which include aspirin, gabapentin 300 mg at bedtime, and amitriptyline at bedtime do seem to be helping.  She has had good relief in the past with injections and with osteopathic manipulation.  He is going to physical therapy and she reports that this is going well.  She is wondering  if she can have more visits of physical therapy.  She would like to continue with osteopathic manipulation treatment, she feels that the combination of the home exercise program with physical therapy and osteopathic manipulation is helping her to feel significantly better.    Past medical history is reviewed and is unchanged in the interim.    Family history is reviewed and is unchanged in the interim.    Review of Systems:  Pertinent negatives include no fevers, chills, unexplained weight loss, bowel incontinence, bladder incontinence, trips, stumbles, falls.  All others reviewed and are negative.     Objective:   CONSTITUTIONAL:  Vital signs as above.  No acute distress.  The patient is well nourished and well groomed.    PSYCHIATRIC:  The patient is awake, alert, oriented to person, place and time.  The patient is answering questions appropriately with clear speech.  Normal affect.  SKIN:  Skin over the face, posterior torso, bilateral upper and lower extremities is clean, dry, intact without rashes.  MUSCULOSKELETAL:  Gait is non-antalgic.  The patient is able to transfer independently.      OSTEOPATHIC STRUCTURAL EXAM:  Symmetric iliac crests, negative standing flexion test.  Lumbar spine: L2-5 flex, rotated/side bent left  Sacrum: Right lateral sacral flexion  Innominates/Pelvis: Anterior/inferior right, posterior/superior left  Thoracic spine: T10-12 flexed, rotated/side bent left  Rib cage: First rib elevated on the left.  Decreased motion over ribs 5 through 10 bilaterally.  Cervical spine: C2-3 flex, rotated/side bent right  Head/OA joint: Side bent right/rotated left    OMT:  TREATMENTS IN PARENTHESES  Lumbar spine: L2-5 flex, rotated/side bent left  (Muscle energy, patient in lateral recumbent)  Sacrum: Right lateral sacral flexion (Myofascial release, patient prone).  Innominates/Pelvis: Anterior/inferior right, posterior/superior left  (Muscle energy with the patient supine).  Thoracic spine: T10-12  flexed, rotated/side bent left (Muscle energy, patient seated)  Rib cage: First rib elevated on the left.  (Myofascial release, patient supine) decreased motion over ribs 5 through 10 bilaterally.  (Myofascial release, patient in the lateral recumbent position).  Cervical spine: C2-3 flex, rotated/side bent right  (Muscle energy with the patient supine).  Head/OA joint: Side bent right/rotated left  (Muscle energy with the patient supine).

## 2021-06-12 NOTE — PROGRESS NOTES
Assessment:   Holly Wood (AKKALPANA Serrato)  is a 70 y.o. y.o. female with past medical history significant for macular degeneration who presents today for follow-up regarding chronic midline low back pain without sciatica with radiation to the bilateral buttocks.  She continues to report neck pain that is stable.  Her pain is thought to be related to osteopathic somatic dysfunction and she continues to have multiple areas of osteopathic somatic dysfunction as listed below.  She did have significant relief following her last osteopathic manipulation treatment on September 8 of 2020.  She remains without any red flag symptoms..      PSP:  Dr. Gao) transferred from [facility])       Plan:     A shared decision making plan was used.  The patient's values and choices were respected.  The following represents what was discussed and decided upon by the physician and the patient.      1.  DIAGNOSTIC TESTS:  No further diagnostic tests are necessary at this time.    -Patient had x-rays of her knees in May 2019 and x-rays of her pelvis/hips in July 2018.  2.  PHYSICAL THERAPY: The patient is currently enrolled in physical therapy.  She is encouraged to continue doing her home exercise program on a regular basis.  3.  MEDICATIONS:  No changes to the medications today.    -She can remain on the gabapentin 300 to 600 mg at bedtime.  -She can continue with Elavil 25 mg at bedtime.  -She can continue to take the aspirin as needed for pain.  4.  INTERVENTIONS:  Osteopathic manipulation was performed to 7 areas today.  The patient tolerated the treatment well and she reported relief immediately afterwards.   Please see below for the osteopathic manipulation that was performed today.  5.  PATIENT EDUCATION:    - The patient was advised to rest today and drink plenty of water.  Normal activities can be resumed as tolerated tomorrow.    - The patient was told that soreness following the treatment is normal and should improve within  a few days.  Ice should be used (as opposed to heat) if soreness occurs.  If the soreness is concerning, the clinic should be notified so further instructions can be given.  6.  FOLLOW-UP: The patient is asked to follow-up in 4 weeks. If there are any questions/concerns or any significant worsening of pain prior to that time, the patient is asked to call the clinic via the nurse navigation line or via [facility] [employer].     Subjective:     Holly Wood  (AKA Ama) is a 70 y.o. female who presents today for follow-up regarding continued chronic midline low back pain with bilateral buttock pain.  Patient reports that she had good relief after last osteopathic manipulation treatment performed on September 8 of 2020.  She reports that pain is started to return in the last week or so.  She notices it right in the midline of her spine over the base of the sacrum.  She then gets pain rating and into the lateral aspects of both buttocks.  She denies any sciatica type pain.  She rates her pain today at a 1 out of 10.  At worst it is a 7 out of 10.  At best it is a 1 out of 10.  Her pain is worse with standing, lifting, walking, going up and down stairs.  She does feel better with taking her medications.  She is taking amitriptyline 25 mg at bedtime in addition to gabapentin 300 mg at bedtime.  She has been going to physical therapy and she reports that Brea has been giving her exercises that do seem to aggravate the pain temporarily, but overall seem to be very effective.  She feels that the stretching exercises that provide the most relief.  Resting also provides relief.  She has been remaining active with walking and bike riding.  She denies any new symptoms at this time.  She reports that her neck pain while not gone, is stable and very manageable.  She would like to continue with osteopathic manipulation treatment.    Past medical history is reviewed and is unchanged in the interim.    Family history is reviewed and is  unchanged in the interim.    Review of Systems: Needed for any numbness, tingling, weakness in the extremities..  Pertinent negatives include no fevers, chills, unexplained weight loss, bowel incontinence, bladder incontinence, trips, stumbles, falls.  All others reviewed and are negative.     Objective:   CONSTITUTIONAL:  Vital signs as above.  No acute distress.  The patient is well nourished and well groomed.    PSYCHIATRIC:  The patient is awake, alert, oriented to person, place and time.  The patient is answering questions appropriately with clear speech.  Normal affect.  SKIN:  Skin over the face, posterior torso, bilateral upper and lower extremities is clean, dry, intact without rashes.  MUSCULOSKELETAL:  Gait is non-antalgic.  The patient is able to transfer independently.      OSTEOPATHIC STRUCTURAL EXAM:  Positive standing flexion test on the right.  Right inferior iliac crest.  Lumbar spine: L1-2 flexed, rotated/side bent left  Sacrum: Right lateral sacral flexion  Innominates/Pelvis: Right down slipped innominate, anterior/inferior right  Thoracic spine: T6-8 flex, rotated/side bent right  Rib cage: First rib elevated on the left  Cervical spine: C2-4 flex, rotated/side bent right  Head/OA joint: Side bent right/rotated left    OMT:  TREATMENTS IN PARENTHESES  Lumbar spine: L1-2 flexed, rotated/side bent left (Myofascial release, patient prone).  Sacrum: Right lateral sacral flexion (Myofascial release, patient prone).  Innominates/Pelvis: Right down slipped innominate (Myofascial release, patient prone)., anterior/inferior right  (Muscle energy with the patient supine).  Thoracic spine: T6-8 flex, rotated/side bent right (Muscle energy, patient seated)  Rib cage: First rib elevated on the left (Myofascial release, patient supine)  Cervical spine: C2-4 flex, rotated/side bent right  (Muscle energy with the patient supine).  Head/OA joint: Side bent right/rotated left  (Muscle energy with the patient  supine).

## 2021-06-12 NOTE — PATIENT INSTRUCTIONS - HE
Please rest today and drink plenty of water.      It is normal to have soreness following the treatment.  Please use ice over the sore areas.  You may take your usual pain medications.  Please call the clinic at [phone] or contact Dr. Gao via [email or web address] if the soreness is concerning to you.      Please follow-up in 4 weeks.

## 2021-06-12 NOTE — PROGRESS NOTES
[facility] Daily Progress/Monthly Summary/DIscharge Summary     Patient Name: Holly Wood  Date: 10/13/2020  Visit #: 7  Referral Diagnosis: Bilateral Chronic Knee Pain  Referring provider: Dr. MARLIN Young  Visit Diagnosis:     ICD-10-CM    1. Bilateral chronic knee pain  M25.561     M25.562     G89.29    2. Chronic midline low back pain without sciatica  M54.5     G89.29    3. Chronic hip pain, left  M25.552     G89.29    4. Generalized muscle weakness  M62.81      From 7/20/20 eval:  Holly is a 70 year old female seen in [facility] on 7/17/20 for back pain, and now referred to PT for chronic bilateral knee pain 0f 25 years thought to be from OA. Knee pain is 7-8/10 and is worse with standing,stair climbing, lifting, and kneeling.  She is referred now with hopes of avoiding knee joint injections. Patient did have osteopathic manipulations of her back 7/17/20,  and SI Joint injections 7/2/20 which did help the pain. 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 Patient has always helped  in construction/general james for years, and did lots of drywall lifting, etc. She is sore in the right hip from lifting paint cans to the recycling center with grand daughter this past weekend. Exam shows slightly more limited left knee AROM with slight lateral tilt and glide of left patella and mildly enlarged medial joint line of the left knee- all consistent with OA and patellar dysfunction    Assessment:     Patient last seen in PT one month ago. Patient is performing all the exercises and is experiencing much less pain in knees and buttock, and no burning pain even at rest.  Patient walking with less right limp and more energetic stride, but does have definite right gluteal weakness and hip drop.    All goals have been met.  Patient has attended 7 visits from 7/20/20  to 10/13/20 with no missed appointments  Patient demonstrates understanding/independence with home program.  Patient is benefitting from skilled physical therapy and is making steady progress toward functional goals.     Goal Status:  Pt. will be independent with home exercise program in : 12 weeks;Comment  Comment:: Patient has been performing them and is seeing results- she feels she needs more strength.  Pt. will be able to walk : 20 minutes;with less pain;with less difficulty;for community mobility;for exercise/recreation;in 12 weeks;Comment  Comment:: with 0-3/10 knee pain: much improved knee pain when walking, on stairs, and even at rest. She is able to walk 20 minutes briskly with 5/10 knee pain. Patient was able to walk one mile (1 1/2 hours) and all leg pain bilaterally was 5/10    No data recorded    Plan / Patient Education:     dicontinue to home exercise program.   Patient will need a new order to resume PT.    Subjective:     Pain Ratin-1 Knees are doing good, hips and back are doing well. At night everything does hurts- generally a 4/10. I sleep well at night with the aid of Amytriptilin, Gabapentin, and Aspirin. The exercises help a lot, and helps reset the muscle memory so I walk better before I get up.  I am moving much better and am amazed that I can walk so much better. .I can stand up straight now, and I don't have the buttock pain anymore! Stairs are so much easier.  I don;t have the burning pain in the knees when on the stairs or when sitting in the recliner. I was able to walk for one mile for the first time in one year.       Objective:     Patient able to walk with much equal strides and good push off, and sit to stand she is much more at ease and fluid.  SL stand is much improved- 4 seconds with severe sway bilaterally.  R hip abduction 4/5  Single heel raises: 1 reps SL heel raise- 3/5 calf strength.  Resting HR 87 bpm, resting O2 is 97%., post exercise  bpm, resting O2 is 96%    6 minute walk test  Today is 411.2 yards (10%) and was  333.3 yards one month ago(5% of norm sex and age)- no use of cane. Pain post walk 0-1/10- especially LB.  Sit to stand test is 14 reps in 30 seconds from 18 inches (was 8 1/2 reps one month 9/16/20).      Treatment Today     TREATMENT MINUTES COMMENTS   Evaluation     Self-care/ Home management     Manual therapy     Neuromuscular Re-education     Therapeutic Activity     Therapeutic Exercises 55 Added SL heel raises, and trunk extensor strength.  Exercises:  Exercise #1: Piriformis/Gluteal stretches- HEP. Also doing doing neck stretch, hp flexor stretch, greeen band row, corner stretch, roation stretch  Comment #1: Abdominal; set with SLR- 5-10 reps/leg daily  Exercise #2: ITB stretch in supine: 30 seconds daily  Comment #2: Bridge- 10 reps-3x/wk  Exercise #3: Side leg raise / clamshell  bilaterally, 5-10 reps daily- 3x/wk  Comment #3: Seated gluteal and quad isometric- 5 seconds, 10-20 reps daily  Exercise #4: Double heel raises- 5-10 reps- 2x/day, increase to single leg heel raises  Comment #4: MInisquats at counter- 5-10 reps, 2x/day  Exercise #5: Supermans- 2-60 sec, 2-10 reps, 3 x/week         Gait training     Modality__________________                Total 55    Blank areas are intentional and mean the treatment did not include these items.       Brea Olvera  10/13/2020

## 2021-06-13 NOTE — TELEPHONE ENCOUNTER
PSP:  Dr. Gao  Last clinic visit:  10/7/2020  Reason for call: Medication refill  Clinical information:  Pt calling to request refill of Gabapentin 300 mg capsules. She reports she takes 1 capsule at bedtime. Pharmacy verified.   Advice given to patient: Informed pt that her request will be sent to the provider. 90 day supply prepped.   Provider to address: Please review/edit and approve if appropriate.

## 2021-06-16 NOTE — PROGRESS NOTES
Neurosurgery consultation was requested by: Dr. Laurel Beasley  Pain: Neck pain   Radicular Pain is present: Denies radicular pain   Lhermitte sign: No  Motor complaints: Denies weakness  Sensory complaints: Denies numbness and tingling   Gait and balance issues: Denies   Bowel or bladder issues: Denies   Duration of SX is: 3 years  The symptoms are worse with: Constant   The symptoms are better with: Heating pad or aleve   Injury: Denies   Severity is: Mild - moderate  Patient has tried the following conservative measures: She had done PT for 5 weeks and did not help pain.   NDI score is :  38%  Ciara,GISSELL

## 2021-06-16 NOTE — PROGRESS NOTES
Assessment:   Holly Wood is a 67 y.o. y.o. female with past medical history significant for GERD, macular degeneration, obesity who presents today for follow-up regarding chronic left neck pain and occipital pain.  MRI of the cervical spine shows cervical facet hypertrophy throughout.  The patient is restricted range of motion with a positive Kemps test, consistent with cervical facet syndrome.  She also had tenderness to palpation of the left occipital nerve. When I saw the patient in consultation, felt she was symptomatic from both left occipital neuritis and left upper cervical facet arthropathy.  I performed a left occipital nerve block on March 1, 2018 which provided 50% relief of her pain for 3 days.  Since she had only partial, short-term relief of her pain with a left occipital nerve block, I now feel that she is primarily symptomatic from the cervical facet arthropathy.  She does not have any radicular symptoms.  She is neurologically intact.       Plan:     A shared decision making plan was used.  The patient's values and choices were respected.  The following represents what was discussed and decided upon by the physician assistant and the patient.      1.  DIAGNOSTIC TESTS: I reviewed the MRI cervical spine.  No further diagnostic tests were ordered.    2.  PHYSICAL THERAPY: No further physical therapy was ordered.  The patient completed 5 weeks of physical therapy in September 2017.  She does her home exercises on a daily basis.    3.  MEDICATIONS: No changes are made to the patient's medications.  Uses ibuprofen 800 mg daily.  -The patient is currently on cephalexin following a biopsy of her lip for evaluation for Sjogren's.  She will take her last dose of antibiotics on Sunday, March 18.    4.  INTERVENTIONS: I recommended that we proceed with a left C2, C3, C4 medial branch block.  The patient will have this procedure after she completes her antibiotics.  If the patient has a positive response  to the left C2, C3, C4 medial branch block, I would like the patient to try left C2-3, C3-4 facet joint injection.  If the facet joint injection provides only partial short-term relief of her pain, I would recommend a left C2, C3, C4 radiofrequency ablation.    5.  PATIENT EDUCATION: The patient is in agreement with the above plan.  All questions were answered.  -The patient did bring up the fact today that she has a difficult time discussing her pain.  She feels like she is complaining her whining.  She states that she would feel guilty if the injection did not help.  The patient may benefit from referral to behavioral health.  I did not discuss this with the patient at today's visit, but I plan to discuss this with her at her follow-up.    6.  FOLLOW-UP: The patient will return to the clinic for a left C2, C3, C4 medial branch block.  She has any questions or concerns in the meantime, she should not hesitate to contact our clinic.    Subjective:     Holly Wood is a 67 y.o. female who presents today for follow-up regarding chronic left neck pain and left occipital pain.  The patient status post a left occipital nerve block on March 1, 2018.  The patient reports that this injection provided 50% relief of her pain but only lasted 3 days.  After that, her pain returned to baseline.    The patient continues to complain of pain in the left upper neck and left occipital region.  She denies any pain radiating down the arm.  She denies any significant right-sided pain.  She rates her pain today as a 6 out of 10.  At its best it is a 2 out of 10.  At its worst it is a 6 out of 10.  The patient's pain is aggravated with exercise, bending forward, lifting, and turning her neck.  It is alleviated with keeping her neck in a neutral position and applying heat.  The patient denies any numbness, tingling, or weakness down the arms.  She denies any new symptoms since she was last seen.    The patient completed 5 weeks of  physical therapy in September 2017.  She does her home exercises.  Uses ibuprofen 800 mg daily.    Past medical history is reviewed and is pertinent for having a lip biopsy for evaluation of Sjogren's syndrome.  She is on cephalexin.  She will take her last dose of cephalexin on Sunday, March 18.    Family history is reviewed and is unchanged in the interim.    Review of Systems:  Negative for numbness/tingling, loss of bowel/bladder control, footdrop, weakness, headache, dizziness, nausea/vomiting, blurry vision, balance changes.     Objective:   CONSTITUTIONAL:  Vital signs as above.  No acute distress.  The patient is well nourished and well groomed.    PSYCHIATRIC:  The patient is awake, alert, oriented to person, place and time.  The patient is answering questions appropriately with clear speech.  Normal affect.  HEENT: Normocephalic, atraumatic.  Sclera clear.  Neck is supple.  SKIN:  Skin over the face, neck bilateral upper extremities is clean, dry, intact without rashes.  MUSCULOSKELETAL: The patient has 5/5 strength for the bilateral shoulder abductors, elbow flexors/extensors, wrist extensors, finger flexors/abductors.  Cervical range of motion is intact with flexion, moderately restricted with extension, moderately restricted with lateral rotation to left, mildly restricted with lateral rotation to the right.  She has severe restriction with lateral flexion to the left and moderate restriction with lateral flexion to the right.  The patient has tenderness palpation over the left occipital nerve and of the left upper cervical facets.  Patient has a positive Kemps test on the left.  NEUROLOGICAL:   Sensation to light touch is intact over bilateral upper extremities throughout.    RESULTS: I reviewed the MRI of the cervical spine from [facility] dated February 2018.  This shows cervical facet hypertrophy throughout.  At C3-4 there is moderate left foraminal stenosis.  At C4-5 there is moderate left  foraminal stenosis.  At C5-6 there is moderate left foraminal stenosis, primarily secondary to annular bulge and osteophytic endplate with uncinate spurring and facet hypertrophy.  At C6-7 there is severe right foraminal stenosis secondary to annular bulge eccentric to the right facet hypertrophy.  There is also mild spinal canal stenosis at this level.  Please see report for further details.

## 2021-06-16 NOTE — PROGRESS NOTES
Assessment:   Holly Wood is a 68 y.o. y.o. female with past medical history significant for GERD, macular degeneration, obesity who presents today for follow-up regarding chronic left neck pain and occipital pain.  MRI cervical spine shows cervical facet hypertrophy throughout.  There is foraminal stenosis on the left at C3-4 rated moderate in severity.  The patient initially had a left occipital nerve block on March 1, 2018 which provided 50% relief of her pain but only lasted 3 days.  She then failed a left C2, C3, C4 medial branch block on March 21 (preprocedure pain score 7/10, postprocedure pain score 5/10 with no further improvement).  She may be symptomatic from the foraminal stenosis on the left at C3-4.       Plan:     A shared decision making plan was used.  The patient's values and choices were respected.  The following represents what was discussed and decided upon by the physician assistant and the patient.      1.  DIAGNOSTIC TESTS: I reviewed the MRI cervical spine.  No further diagnostic tests were ordered.    2.  PHYSICAL THERAPY: No further physical therapy was ordered.  The patient completed 5 weeks of physical therapy in September 2017.  I encouraged her to continue doing her home exercises on a daily basis.    3.  MEDICATIONS: No changes are made to the patient's medications.  Uses ibuprofen 800 mg daily.  -If pain is refractory, we could consider trialing low-dose gabapentin.    4.  INTERVENTIONS: I offered the patient a left C3-4 transforaminal epidural steroid injection as the next step.  I did tell the patient that I am most optimistic about an epidural steroid injection when there is significant radiating pain.  With pain that stays in the neck, I am less optimistic that it will provide significant relief of the pain.  The patient voiced understanding to this and would like to proceed.    5.  PATIENT EDUCATION: The patient is in agreement with the above plan.  All questions were  answered.    6.  FOLLOW-UP: The patient return to the clinic for her left C3-4 transforaminal epidural steroid injection.  She has any questions or concerns in the meantime, she should not hesitate to contact our clinic.    Subjective:     Holly Wood is a 68 y.o. female who presents today for follow-up regarding chronic left neck pain and occipital pain.  The patient failed a left C2, C3, C4 medial branch block on March 21, 2018.  Her preprocedure pain score was 7/10.  Her postprocedure pain score was 5/10.  It did not improve any further than this.  The patient was very disappointed in this results.  She felt that the injection was put into the correct location, but she did not feel that it provided meaningful relief.    The patient continues to complain of left-sided neck pain.  She denies any radiation of the pain into the shoulder down the arm.  She feels a tight band sensation in the occipital region.  Rates her pain today as a 6 out of 10.  At its best is a 5-10.  At its worst it is a 7 out of 10.  The patient's pain is aggravated with exercise and cleaning.  It is also aggravated with tilting her head to the left.  It is alleviated with rest.  The patient states that she had one incident of severe pain the same day as her medial branch block which she felt a crunching sensation in her neck.  She denies any new symptoms since she was last seen.  She denies any numbness, tingling, or weakness down the arms.    The patient completed 5 weeks of physical therapy in September 2017.  She does home exercises.  Uses ibuprofen 800 mg daily.    Past medical history is reviewed and is unchanged in the interim.    Family history is reviewed and is unchanged in the interim.    Review of Systems:  Negative for numbness/tingling, loss of bowel/bladder control, footdrop, weakness, headache, dizziness, nausea/vomiting, blurry vision, balance changes.     Objective:   CONSTITUTIONAL:  Vital signs as above.  No acute  distress.  The patient is well nourished and well groomed.    PSYCHIATRIC:  The patient is awake, alert, oriented to person, place and time.  The patient is answering questions appropriately with clear speech.  Normal affect.  HEENT: Normocephalic, atraumatic.  Sclera clear.  Neck is supple.  SKIN:  Skin over the face, neck bilateral upper extremities is clean, dry, intact without rashes.  MUSCULOSKELETAL: The patient has 5/5 strength for the bilateral shoulder abductors, elbow flexors/extensors, wrist extensors, finger flexors/abductors.  Tender to palpation of the left cervical paraspinous muscles.  Cervical range of motion is most severely restricted with lateral flexion to the left.  She has severe reproduction of her pain with lateral flexion to the left.  She has more mild to moderate restriction with extension and lateral flexion to the right.  Rotation is moderately restricted to the left and mildly restricted to the right.  NEUROLOGICAL:    Sensation to light touch is intact over bilateral upper extremities throughout.    RESULTS:  I reviewed the MRI of the cervical spine from [facility] dated February 2018.  This shows cervical facet hypertrophy throughout.  At C3-4 there is moderate left foraminal stenosis.  At C4-5 there is moderate left foraminal stenosis.  At C5-6 there is moderate left foraminal stenosis, primarily secondary to annular bulge and osteophytic endplate with uncinate spurring and facet hypertrophy.  At C6-7 there is severe right foraminal stenosis secondary to annular bulge eccentric to the right facet hypertrophy.  There is also mild spinal canal stenosis at this level.  Please see report for further details.

## 2021-06-16 NOTE — PROGRESS NOTES
NEUROSURGERY CONSULTATION NOTE:    Assessment: Symptomatic facet arthropathy    Plan: I have recommended an evaluation through the spine center.  I think she has a facet syndrome.  I do not interpret her imaging as being radicular symptoms.  I think a conservative management protocol would be appropriate.  I have also advised her to do 800 mg of ibuprofen every morning.  I will see her back after assessment and injections.  I do think this is an arthritic problem in her neck.  I do not think there is a surgical solution to her problem.    Holly Wood   [address] [facility] [address]  67 y.o. female is sent to me in consultation   by Laurel Beasley MD     CC:    Chief Complaint   Patient presents with     Neck Pain       HPI:  Neurosurgery consultation was requested by: Dr. Laurel Beasley  Pain: Neck pain   Radicular Pain is present: Denies radicular pain   Lhermitte sign: No  Motor complaints: Denies weakness  Sensory complaints: Denies numbness and tingling   Gait and balance issues: Denies   Bowel or bladder issues: Denies   Duration of SX is: 3 years  The symptoms are worse with: Constant   The symptoms are better with: Heating pad or aleve   Injury: Denies   Severity is:  moderate  Patient has tried the following conservative measures: She had done PT for 5 weeks and did not help pain.   NDI score is :  38%      PROBLEM LIST:  Neck pain    REVIEW OF SYSTEMS:  A 12 point review of systems has been completed and reviewed.  This is negative other than the symptoms listed above.      Past Medical History:   Diagnosis Date     Breast cyst      GERD (gastroesophageal reflux disease)      Insomnia          Past Surgical History:   Procedure Laterality Date     hallax risidus surgery       HYSTERECTOMY  1991         MEDICATIONS:  Current Outpatient Prescriptions   Medication Sig Dispense Refill     aspirin 325 MG EC tablet Take 325 mg by mouth daily.       calcium carbonate (OS-SKYLER) 600 mg calcium  "(1,500 mg) tablet Take 600 mg by mouth 2 (two) times a day with meals.       cyclobenzaprine (FLEXERIL) 10 MG tablet Take 10 mg by mouth.       diclofenac (VOLTAREN) 75 MG EC tablet Take 75 mg by mouth.       GLUC HUTCHISON/CHONDRO HUTCHISON A/VIT C/MN (GLUCOSAMINE 1500 COMPLEX ORAL) Take by mouth.       melatonin 3 mg Tab tablet Take 3 mg by mouth at bedtime as needed.       multivitamin therapeutic tablet Take 1 tablet by mouth daily.       oxybutynin (DITROPAN XL) 10 MG ER tablet Take 10 mg by mouth daily.       RANITIDINE HCL ORAL Take by mouth.       VIT C/E/ZN/COPPR/LUTEIN/ZEAXAN (PRESERVISION AREDS 2 ORAL) Take by mouth.       No current facility-administered medications for this visit.          ALLERGIES/SENSITIVITIES:     Allergies   Allergen Reactions     Bactroban [Mupirocin]      Codeine Unknown       PERTINENT SOCIAL HISTORY:   Social History     Social History     Marital status:      Spouse name: N/A     Number of children: N/A     Years of education: N/A     Social History Main Topics     Smoking status: Former Smoker     Smokeless tobacco: Never Used     Alcohol use None     Drug use: None     Sexual activity: Not Asked       FAMILY HISTORY:  Family History   Problem Relation Age of Onset     Arthritis Mother      Heart disease Mother      Hypertension Mother      Kidney disease Mother      Prostate cancer Father      Bone cancer Father         PHYSICAL EXAM:   Constitutional: /80  Pulse 86  Ht 5' 1\" (1.549 m)  Wt 195 lb (88.5 kg)  SpO2 95%  BMI 36.84 kg/m2    General appearance: Appropriately groomed.  No acute distress.  Interactive.     Mental Status: Mental status: Alert and oriented, mood and affect appropriate, language reception and expression normal, recent and remote memory is normal, higher cortical function normal. Attention span, concentration and ability to follow commands is normal.       Cranial Nerves: Face is symmetric.  Extraocular movements are full, conjugate and without " nystagmus.  Hearing is preserved.  Shoulder position is symmetric.  Tongue is midline with normal motion.       Motor: Motor exam nl bilateral UE. Tone nl, bulk nl and strength 5/5 all groups.      Sensory: Sensory exam by subjective report intact to LT,PP,Position and Vib. in the UE and  LE.     Station and Gait:  Station and Gait- nl stride length,  balance and mildred.     Reflexes; supinator, biceps, triceps, knee/ ankle jerk intact. No hoffmans/babinski/ clonus.    IMAGING:  I have personally reviewed the images and discussed the findings with Holly Wood.  She has significant facet arthropathy that is present throughout the cervical spine.  She does have a resultant right neural foraminal stenosis at C6-7 due to the facet hypertrophy and bulging of the disc.  She has left neural foraminal stenosis at C5-6 due to the facet hypertrophy and annular bulge.  She does have moderate left foraminal stenosis at C4-5 and C3-4.    CC:     Laurel Beasley MD[address] [place] [id number]

## 2021-06-16 NOTE — PROGRESS NOTES
Holly Wood is a 67 y.o. female returns to the clinic for left occipital nerve blocks.  I saw the patient in consultation on February 26, 2018.  At that time I ordered labs because the patient does have a chronic enlarged lymph node in the left suboccipital region.  I did speak on the phone directly with the radiologist who read her MRI c-spine who felt that the lymph node had a benign appearance, but recommended checking a CBC with differential.  Labs were normal so she returns today for her procedure.  She denies any change in pain since she was last seen.  She continues to have left upper neck/occipital pain.    Pre-procedure diagnosis: Left occipital neuralgia  Post-procedure diagnosis:  Same  PROCEDURE: Left occipital nerve block.    The risks of the procedure were discussed with the patient.  These include infection, bleeding, increased pain, no change in pain were discussed with the patient.  The patient gave written and verbal consent to proceed with the procedure.    The most tender area of the left occipital nerve at the nuchal ridge was palpated.  This area was then cleansed with a betadine swab x 3.  A solution of 1 mL of 40 mg of Depomedrol mixed with 2 mL of 1% lidocaine was drawn up.  A 27 guage 1.25 inch needle was inserted down to os.  After aspiration was negative, 1 mL of the solution was injected.  Without withdrawing the needle from the skin, the needle was redirected.  After aspiration was negative, 1 mL of the solution was injected.  Without withdrawing the needle from the skin, the needle was once again redirected.  After aspiration was negative, 1 mL of the remaining solution was injected.      The patient tolerated the procedure well.  The patient was monitored for a short period of time and then discharged under her own power.

## 2021-06-16 NOTE — PROGRESS NOTES
ASSESSMENT: Holly Wood is a 67 y.o. female with past medical history significant for GERD, macular degeneration, obesity who presents today for new patient evaluation of a 2 year history of left neck pain and left occipital pain.  MRI of the cervical spine shows cervical facet hypertrophy throughout.  The patient does have restricted range of motion with a positive Kemps test consistent with cervical facet syndrome.  She also significant tenderness to palpation over the left occipital nerve.  I think she is likely symptomatic from both left occipital neuritis and upper cervical facet arthropathy.  She does not have any radicular symptoms.  She is neurologically intact.  -The patient does have a fairly prominent left suboccipital lymph node which is mobile and fairly soft.  The patient believes it has been there for many months and has not changed.  She denies weight loss, fevers, night sweats.  I did not appreciate any other lymphadenopathy in the cervical region.  I spoke with the radiologist over the phone regarding the lymph node and he felt that the lymph node did not have characteristics concerning for malignancy.  Nonetheless, I would like to check a CBC since she has not had one done recently.    NDI: 30  Who 5: 60    PLAN:  A shared decision making model was used.  The patient's values and choices were respected.  The following represents what was discussed and decided upon by the physician assistant and the patient.      1.  DIAGNOSTIC TESTS: I reviewed the MRI cervical spine.  As mentioned above, I spoke with the radiologist over the phone regarding the MRI.  I did place an order for a CBC with differential for further evaluation of her enlarged lymph node.    2.  PHYSICAL THERAPY: No further physical therapy was started.  The patient completed 5 weeks of physical therapy in September 2017.  She is doing her home exercises on a daily basis.    3.  MEDICATIONS: No changes are made to the patient's  medications.  Uses Tylenol as needed for pain.  Dr. Strauss recommended that she try using ibuprofen.  I agree with this recommendation.  She could also use aspirin.    4.  INTERVENTIONS: Dr. Strauss has recommended a left occipital nerve block.  As long as her lab work is normal, we will proceed with a left occipital nerve block.  -If this injection failed to provide relief, I recommend a left C2, C3, C4 medial branch block.  If she had a positive response to the medial branch blocks, she could have a left C2-3, C3-4 facet joint injection or potentially radiofrequency ablation if the steroid injection provided only short-term relief of her pain.    5.  PATIENT EDUCATION: The patient is in agreement with the above plan.  All questions were answered.    6.  FOLLOW-UP:   A nurse will call patient with the results of her lab work.  As long as this is normal, the patient can follow-up with me for a left occipital nerve block.  If she has any questions or concerns in the meantime, she should not hesitate to contact the clinic.        SUBJECTIVE:  Holly Wood  Is a 67 y.o. female who presents today in consultation at the request of Dr. Strauss for new patient evaluation of neck pain.  The patient states that she began to have neck pain about 2 years ago.  She denies any injury or event to cause the pain.  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  She tended to cradle the phone between the left ear and left shoulder.  Patient states that her pain has been getting progressively more severe.  About 8 months ago, the patient had [place] severe [place] in which she has significant difficulty getting out of bed due to the neck pain.  She was seen at urgent care and was treated with a muscle relaxant and an injection of what was likely Toradol at the time.  Due to the persistent pain, and MRI cervical spine  was ordered and she is referred to Dr. Strauss.  Dr. Strauss has recommended a left occipital nerve block.    Patient complains of pain localized to the left upper neck.  The pain is most severe at the base of the left occipital skull.  She does feel some pain extending down the left side of the neck to the upper trapezius muscle.  She denies any pain radiating further down the arm.  The patient denies any pain radiating up into the head.  She denies headache, per se.  She does have a sensation of tightness in the left suboccipital region.  She states that at its worst, it feels like someone is doing a karate chop on the back of her neck.  The patient's pain is aggravated with tilting her head to the left and then straightening her neck back to neutral position, reading, cervical extension, and lifting the head to get up out of bed.  Her pain is alleviated with keeping her neck in a neutral position and applying heat.  The patient denies any right-sided symptoms.  The patient denies any numbness, tingling, or weakness down the arm.  Additionally, the patient complains of feeling a lump in the left occipital region.  She states that she is told 3 doctors about this.  Two doctors told her likely a lymph node.  The other doctor did not offer an opinion. It is not tender to touch.  She thinks it has been there for many months.  She states it may have been there even longer.  She has not noticed any change in the size of the lymph node.  She denies any recent fevers, chills, sweats.  Denies night sweats.  Denies recent weight loss.  She denies sore throat.  She has been dealing with severe dry mouth and is currently being worked up for possible Sjogren's syndrome.    The patient completed 5 sessions of physical therapy in September 2017.  She does her home exercises on a daily basis.  Patient not had chiropractic treatment.  She has never had any spine surgery or spine injections.  The patient's been using Tylenol 500 mg twice  daily.  She also uses aspirin as needed for pain.  Dr. Strauss has recommended that she use ibuprofen 800 mg every morning.  The patient has not yet started taking the medication.  She just picked up a bottle of ibuprofen today.    Current Outpatient Prescriptions on File Prior to Encounter   Medication Sig Dispense Refill     aspirin 325 MG EC tablet Take 325 mg by mouth daily.       calcium carbonate (OS-SKYLER) 600 mg calcium (1,500 mg) tablet Take 600 mg by mouth 2 (two) times a day with meals.       GLUC HUTCHISON/CHONDRO HUTCHISON A/VIT C/MN (GLUCOSAMINE 1500 COMPLEX ORAL) Take by mouth.       multivitamin therapeutic tablet Take 1 tablet by mouth daily.       RANITIDINE HCL ORAL Take by mouth.       VIT C/E/ZN/COPPR/LUTEIN/ZEAXAN (PRESERVISION AREDS 2 ORAL) Take by mouth.       cyclobenzaprine (FLEXERIL) 10 MG tablet Take 10 mg by mouth.       diclofenac (VOLTAREN) 75 MG EC tablet Take 75 mg by mouth.       melatonin 3 mg Tab tablet Take 3 mg by mouth at bedtime as needed.       oxybutynin (DITROPAN XL) 10 MG ER tablet Take 10 mg by mouth daily.           Allergies   Allergen Reactions     Bactroban [Mupirocin]      Codeine Unknown       Past Medical History:   Diagnosis Date     Breast cyst      GERD (gastroesophageal reflux disease)      Insomnia    Macular degeneration        Past Surgical History:   Procedure Laterality Date     hallax risidus surgery       HYSTERECTOMY  1991       Family History   Problem Relation Age of Onset     Arthritis Mother      Heart disease Mother      Hypertension Mother      Kidney disease Mother      Prostate cancer Father      Bone cancer Father      Social history: Patient is .  She recently retired from managing a plumbing company.  She thinks about 4 alcoholic beverages per week.  She denies tobacco use.  She denies illicit drug use.    ROS: Positive for back pain, joint pain.  Specifically negative for dysphagia, imbalance, fine motor skill difficulties, bowel/bladder dysfunction,  fevers,chills, appetite changes, unexplained weight loss.   Otherwise 13 systems reviewed are negative.  Please see the patient's intake questionnaire from today for details.      OBJECTIVE:  PHYSICAL EXAMINATION:  CONSTITUTIONAL:  Vital signs as above.  No acute distress.  The patient is well nourished and well groomed.  PSYCHIATRIC:  The patient is awake, alert, oriented to person, place, time and answering questions appropriately with clear speech.    HEENT:  Normocephalic, atraumatic.  Sclera clear.    SKIN:  Skin over the face, bilateral upper extremities, and neck is clean, dry, intact without rashes.  LYMPH NODES: The patient has a left suboccipital lymph node which is less than 1 cm in diameter.  It is nontender.  This is mobile and fairly soft.  I do not appreciate any additional lymphadenopathy in the cervical region.  MUSCLE STRENGTH:  5/5 strength for the bilateral shoulder abductors, elbow flexors/extensors, wrist extensors, finger flexors/abductors.  NEURO: I reviewed the MRI of the cervical spine from [facility] dated February 8, 2018.  CN III-XII are grossly intact.  3+ symmetric biceps, 2+ brachioradialis, triceps reflexes bilaterally.  2+ bilateral patellar reflexes, 1+ bilateral Achilles reflexes.  Sensation to light touch is intact over bilateral upper extremities throughout.  Negative Mina's bilaterally.  Negative Babinski's bilaterally.  No ankle clonus.  VASCULAR:  2/4 radial pulses bilaterally.  Warm upper limbs bilaterally.  Capillary refill in the upper extremities is less than 1 second.  MUSCULOSKELETAL: Cervical range of motion is intact with flexion, moderately restricted with extension, moderately restricted with lateral rotation to the left, mildly restricted with lateral rotation to the right.  She has severe restriction with lateral flexion to the left and moderate restriction with lateral flexion to the right.  The patient significant tenderness palpation of the left  occipital nerve (one half the distance between the inion and the left mastoid process).  Mild tenderness palpation of left cervical facets.  Mild hypertonicity in the left upper trapezius muscle.  And relates with a narrow based, nonantalgic gait.  Tandem gait intact.    RESULTS: This shows cervical facet hypertrophy throughout.  At C3-4 there is moderate left foraminal stenosis.  At C4-5 there is moderate left foraminal stenosis.  At C5-6 there is moderate left foraminal stenosis, primarily secondary to annular bulge and osteophytic endplate with uncinate spurring and facet hypertrophy.  At C6-7 there is severe right foraminal stenosis secondary to annular bulge eccentric to the right facet hypertrophy.  There is also mild spinal canal stenosis at this level.  Please see report for further details.

## 2021-06-17 NOTE — PROGRESS NOTES
Assessment:   Holly Wood is a 68 y.o. y.o. female with past medical history significant for GERD, macular degeneration, obesity who presents today for follow-up regarding chronic left neck pain.  MRI cervical spine shows facet arthropathy throughout the cervical spine.  There is foraminal stenosis on the left at C3-4 and C4-5 rated moderate in severity.  The patient has had extensive treatment for this.  She underwent physical therapy.  She had a left occipital nerve block on March 1, 2018 which provided 50% relief of her pain but only lasted 3 days.  She then failed a left C2, C3, C4 medial branch block on March 21. I have been ordered a left C3-4 transforaminal epidural steroid injection for the patient, but at the time of the injection, it was decided to try a left C3-4, C4-5 facet joint injection instead.  This did not provide any relief of her pain.       Plan:     A shared decision making plan was used.  The patient's values and choices were respected.  The following represents what was discussed and decided upon by the physician assistant and the patient.      1.  DIAGNOSTIC TESTS: I reviewed the MRI cervical spine.  No further diagnostic tests were ordered.    2.  PHYSICAL THERAPY: No further physical therapy was ordered.  If the patient fails to improve after a left C3-4 transforaminal epidural steroid injection, I will recommend that the patient return to physical therapy.  She did 5 weeks of physical therapy at [facility], [place], operated by [facility] in September 2017.  I think I would likely recommend that the patient try [facility] physical therapy as the next step.    3.  MEDICATIONS:    -I provided a prescription for Ativan to be used prior to the procedure.  -Otherwise, no changes are made to the patient's medications.  Uses ibuprofen-800 mg daily.  -If pain is refractory, we could consider adding low-dose gabapentin.    4.  INTERVENTIONS: I offered the patient a left C3-4 transforaminal epidural steroid injection.  The patient  indicated she would like to proceed and an order was placed.  The patient has requested oral sedation for the procedure.  I provided a prescription for Ativan and I had the patient signed informed consent today for the procedure.    5.  PATIENT EDUCATION: The patient is in agreement with the above plan.  All questions were answered.    6.  FOLLOW-UP: The patient return to the clinic for her left C3-4 transforaminal epidural steroid injection.  She has any questions or concerns in the meantime, she should not hesitate contact our clinic.  Left neck pain.  The patient status post a left C3-4, C4-5 facet joint injection on March 29, 2018.  This injection did not provide any relief of her pain.  The patient previously failed a left C2, C3, C4 medial branch block    Subjective:     Holly Wood is a 68 y.o. female who presents today for follow-up regarding March 21.  Prior to that, she had a left occipital nerve block on March 1, 2018 which provided 50% relief of her pain but only lasted 3 days.    The patient continues to complain of left neck pain.  She denies any radiation of the pain.  She rates her pain today as a 4 out of 10.  At its best it is a 4 out of 10.  At its worst it is a 6 out of 10.  The patient's pain is aggravated with exercises and lifting.  The patient tried grocery shopping several days ago and that caused worsening pain.  Her pain is alleviated with taking ibuprofen.  The patient denies any pain in the arm.  She denies any numbness, tingling, or weakness.    The patient completed 5 weeks of physical therapy at [facility] in September 2017.  She does her home exercises.  Uses ibuprofen 800 mg daily.    Past medical history is reviewed and is pertinent for following up with ENT.  The ENT has ruled out Sjogren's syndrome or cancer.    Family history is reviewed and is unchanged in the interim.    Review of Systems:  Negative for numbness/tingling, loss of bowel/bladder control, footdrop, weakness,  headache, dizziness, nausea/vomiting, blurry vision, balance changes.     Objective:   CONSTITUTIONAL:  Vital signs as above.  No acute distress.  The patient is well nourished and well groomed.    PSYCHIATRIC:  The patient is awake, alert, oriented to person, place and time.  The patient is answering questions appropriately with clear speech.  Normal affect.  HEENT: Normocephalic, atraumatic.  Sclera clear.  Neck is supple.  SKIN:  Skin over the face, neck bilateral upper extremities is clean, dry, intact without rashes.  MUSCULOSKELETAL: The patient has 5/5 strength for the bilateral shoulder abductors, elbow flexors/extensors, wrist extensors, finger flexors/abductors.  Cervical range of motion is most severely restricted with lateral flexion to the left.  She is more moderate restriction with extension and lateral flexion to the right.  Rotation is moderately restricted left and mildly restricted to the right.  Tender to palpation of the left cervical paraspinous muscles at approximately C3-4.  NEUROLOGICAL:  Sensation to light touch is intact over bilateral upper extremities.    RESULTS:   I reviewed the MRI of the cervical spine from [facility] dated February 2018.  This shows cervical facet hypertrophy throughout.  At C3-4 there is moderate left foraminal stenosis.  At C4-5 there is moderate left foraminal stenosis.  At C5-6 there is moderate left foraminal stenosis, primarily secondary to annular bulge and osteophytic endplate with uncinate spurring and facet hypertrophy.  At C6-7 there is severe right foraminal stenosis secondary to annular bulge eccentric to the right facet hypertrophy.  There is also mild spinal canal stenosis at this level.  Please see report for further details.

## 2021-06-17 NOTE — PROGRESS NOTES
Assessment:   Holly Wood is a 68 y.o. y.o. female with past medical history significant for  GERD, macular degeneration, obesity  who presents today for follow-up regarding sided neck pain thought to be from cervical facet syndrome and osteopathic somatic dysfunction.  The patient is status post 2 osteopathic manipulation treatments, with the last one performed on May 2 of 2018.  She is reporting continued improvement with osteopathic manipulation.  She is without any red flag symptoms..         Plan:     A shared decision making plan was used.  The patient's values and choices were respected.  The following represents what was discussed and decided upon by the physician and the patient.      1.  DIAGNOSTIC TESTS:  No further diagnostic tests are necessary at this time.    2.  PHYSICAL THERAPY: An order for physical therapy was provided today to have her trial a home cervical traction unit.  She only needs to go for 1 or 2 sessions.  The order was given to the [facility] re[facility].  3.  MEDICATIONS:  No changes to the medications today.    4.  INTERVENTIONS:  Osteopathic manipulation was performed to 7 areas today.  The patient tolerated the treatment well and ported relief immediately afterwards..   Please see below for the osteopathic manipulation that was performed today.  5.  PATIENT EDUCATION:    - The patient was advised to rest today and drink plenty of water.  Normal activities can be resumed as tolerated tomorrow.    - The patient was told that soreness following the treatment is normal and should improve within a few days.  If the soreness is concerning, the clinic should be notified so further instructions can be given.  -The patient was shown the thoracic reach and roll exercise and asked to perform this twice a day.  6.  FOLLOW-UP: The patient is asked to follow-up in 1-2 weeks.  If she has any questions or concerns before that time she is encouraged to call the clinic..     Subjective:      Holly Wood is a 68 y.o. female who presents today for follow-up regarding sided neck pain.  The patient is status post 2 osteopathic manipulation treatments, with the last one being on the second of 2018.  The patient reports continued improvement in the neck pain.  She reports that she is able to be more active which makes her quite happy.  She has been able to bike ride and garden.  She feels much more confident with doing activities that she has decreased pain.  Her  has been trying to do traction on her neck and this feels quite good.  She would be interested in a home traction unit.  The pain is dislocating the left side of her neck.  It does not radiate into the shoulder.  She denies any radiation of pain going down into the arms or the fingers.  She rates her pain at a 2 out of 10 today.  At best it is a 2 out of 10.  The pain is worse with lifting and with exercise as well as at the end of the day.  She does feel significantly better with osteopathic manipulation.  She denies any new symptoms at this time.    Past medical history is reviewed and is unchanged in the interim.    Family history is reviewed and is unchanged in the interim.    Review of Systems:  Negative for numbness/tingling, weakness, bowel/bladder dysfunction, foot drop, headache, dizziness, nausea/vomiting, blurred vision, balance changes.     Objective:   CONSTITUTIONAL:  Vital signs as above.  No acute distress.  The patient is well nourished and well groomed.    PSYCHIATRIC:  The patient is awake, alert, oriented to person, place and time.  The patient is answering questions appropriately with clear speech.  Normal affect.  SKIN:  Skin over the face, posterior torso, bilateral upper and lower extremities is clean, dry, intact without rashes.  MUSCULOSKELETAL:  Gait is non-antalgic.  The patient is able to transfer independently.  Patient has significant tenderness to palpation over the left cervical paraspinal muscles  compared to the right.    OSTEOPATHIC STRUCTURAL EXAM:  Standing flexion test on the right side.  Right inferior iliac crest.  Lumbar spine: L 3-5 flexed, rotated/side bent left  Sacrum: Left on left forward sacral torsion  Innominate: Right down slipped innominate, anterior/inferior right  Thoracic spine: T9-12 flexed, rotated/side bend right  Rib cage: Decreased motion over ribs 4 through 7 on the left.  First rib elevated on the left.  Cervical spine: C3-4 flexed, rotated/side bent right.  She has significant tenderness over the articular pillars on the left side at the C3 through C5 level.  Head/OA joint: Side bent right/rotated left    OMT:  TREATMENTS IN PARENTHESES  Lumbar spine: L 3-5 flexed, rotated/side bent left (myofascial release with patient prone, using the leg as a lever)  Sacrum: Left on left forward sacral torsion (myofascial release with patient supine).  Innominate: Right down slipped innominate (myofascial release with patient prone with respiratory component), anterior/inferior right (Muscle energy with the patient supine).  Thoracic spine: T9-12 flexed, rotated/side bend right (Muscle energy, patient seated)  Rib cage: Decreased motion over ribs 4 through 7 on the left (Myofascial release, patient in the lateral recumbent position)..  First rib elevated on the left.  (Myofascial release, patient supine)  Cervical spine: C3-4 flexed, rotated/side bent right.  (Muscle energy with the patient supine).  She has significant tenderness over the articular pillars on the left side at the C3 through C5 level.  Head/OA joint: Side bent right/rotated left (myofascial release using occipital tension release, patient supine)

## 2021-06-17 NOTE — PATIENT INSTRUCTIONS - HE
Please rest today and drink plenty of water.      It is normal to have soreness following the treatment.  Please use ice over the sore areas.  You may take your usual pain medications.  Please call the clinic at [phone] or contact Dr. Gao via [facility] if the soreness is concerning to you.      Please follow-up 1 week with Dr. Beltre for injection and then 2 weeks after injection with Dr. Gao

## 2021-06-17 NOTE — PATIENT INSTRUCTIONS - HE
Follow-up visit in 2 weeks with Dr. Gao to discuss injection outcome and determine care plan going forward.     DISCHARGE INSTRUCTIONS    During office hours (8:00 a.m.- 4:00 p.m.) questions or concerns may be answered  by calling [facility] Navigation Nurses at  [phone].  Messages received after hours will be returned the following business day.      In the case of an emergency, please dial 911 or seek assistance at the nearest Emergency Room/Urgent Care facility.     All Patients:    ? You may experience an increase in your symptoms for the first 2 days (It may take anywhere between 2 days- 2 weeks for the steroid to have maximum effect).    ? You may use ice on the injection site, as frequently as 20 minutes each hour if needed.    ? You may take your pain medicine.    ? You may continue taking your regular medication after your injection. If you have had a Medial Branch Block you may resume pain medication once your pain diary is completed.    ? You may shower. No swimming, tub bath or hot tub for 48 hours.  You may remove your bandaid/bandage as soon as you are home.    ? You may resume light activities, as tolerated.    ? Resume your usual diet as tolerated.    ? It is strongly advised that you do not drive for 1-3 hours post injection.    ? If you have had oral sedation:  Do not drive for 8 hours post injection.      ? If you have had IV sedation:  Do not drive for 24 hours post injection.  Do not operate hazardous machinery or make important personal/business decisions for 24 hours.      POSSIBLE STEROID SIDE EFFECTS (If steroid/cortisone was used for your procedure)    -If you experience these symptoms, it should only last for a short period      Swelling of the legs                Skin redness (flushing)       Mouth (oral) irritation     Blood sugar (glucose) levels              Sweats                      Mood changes    Headache    Sleeplessness    Weakened immune system for up to 14 days,  which could increase the risk of james the COVID-19 virus and/or experiencing more severe symptoms of the disease, if exposed.    Decreased effectiveness of the flu vaccine if given within 2 weeks of the steroid.         POSSIBLE PROCEDURE SIDE EFFECTS  -Call the Spine Center if you are concerned    Increased Pain             Increased numbness/tingling        Nausea/Vomiting            Bruising/bleeding at site        Redness or swelling                                                Difficulty walking        Weakness             Fever greater than 100.5    *In the event of a severe headache after an epidural steroid injection that is relieved by lying down, please call the [facility] to speak with a clinical staff member*

## 2021-06-17 NOTE — PROGRESS NOTES
Assessment:   Holly Wood is a 68 y.o. y.o. female with past medical history significant for GERD, macular degeneration, obesity who presents today for follow-up regarding chronic left-sided neck pain without radicular symptoms.  The patient's pain is thought to be due to cervical facet syndrome, though she does have significant foraminal stenosis at the left C3-4 level.  The patient is noticing significant improvement following her first osteopathic manipulation treatment on April 24 of 2018.  She continues to have multiple areas of osteopathic somatic dysfunction as listed below..         Plan:     A shared decision making plan was used.  The patient's values and choices were respected.  The following represents what was discussed and decided upon by the physician and the patient.      1.  DIAGNOSTIC TESTS:  No further diagnostic tests are necessary at this time.    2.  PHYSICAL THERAPY: No further physical therapy at this time.  The patient has previously completed physical therapy and is encouraged to continue doing the home exercise program daily.    3.  MEDICATIONS:  No changes to the medications today.    4.  INTERVENTIONS:  Osteopathic manipulation was performed to 7 areas today.  The patient tolerated the treatment well and reported relief immediately afterwards..   Please see below for the osteopathic manipulation that was performed today.  5.  PATIENT EDUCATION:    - The patient was advised to rest today and drink plenty of water.  Normal activities can be resumed as tolerated tomorrow.    - The patient was told that soreness following the treatment is normal and should improve within a few days.  If the soreness is concerning, the clinic should be notified so further instructions can be given.  -Patient is encouraged to continue doing her physical therapy exercises.  6.  FOLLOW-UP: The patient is asked to follow-up in 1 week.  If she has any questions, concerns, or any significant worsening of her  "pain prior to that time she is encouraged to call the clinic..     Subjective:     Holly Wood is a 68 y.o. female who presents today for follow-up regarding left sided neck pain.  The patient is status post her first osteopathic manipulation treatment on April 24, 2018.  The patient is reporting that she had significant relief following this treatment.  She still feels better at this time, though her pain is not gone.  She says that she no longer gets the constant stabbing in her neck, that would cause her to swear.  She reports that she still has some constant pain, but it is significantly better.  She was able to ride her bike and garden.  She feels more \"confident) and being able to do activities without having significant pain.  Again the pain is located in the left side of her neck.  It does not radiate into the arm.  She denies any numbness tingling or weakness in the arm.  She rates her pain today at a 2 out of 10.  At best it is a 2 out of 10.  At worst it is a 4 out of 10.  Her neck pain is worse with lifting and with exercise.  She does feel better with osteopathic manipulation.  She denies any new symptoms at this time.    Past medical history is reviewed and is unchanged in the interim.    Family history is reviewed and is unchanged in the interim.    Review of Systems:  Negative for numbness/tingling, weakness, bowel/bladder dysfunction, foot drop, headache, dizziness, nausea/vomiting, blurred vision, balance changes.     Objective:   CONSTITUTIONAL:  Vital signs as above.  No acute distress.  The patient is well nourished and well groomed.    PSYCHIATRIC:  The patient is awake, alert, oriented to person, place and time.  The patient is answering questions appropriately with clear speech.  Normal affect.  SKIN:  Skin over the face, posterior torso, bilateral upper and lower extremities is clean, dry, intact without rashes.  MUSCULOSKELETAL:  Gait is non-antalgic.  The patient is able to transfer " independently.  The patient has significant tenderness over the left cervical paraspinal muscles.    OSTEOPATHIC STRUCTURAL EXAM:  Negative standing flexion test.  Symmetric iliac crest.  Lumbar spine: L4-5 flexed, rotated/side bent right  Sacrum: Decreased fascial motion to the left  Innominate/pelvis: Anterior/inferior right, posterior/superior left  Thoracic spine: T2-5 flexed, rotated/side bent left  Rib cage: First rib elevated on the left  Cervical spine: C3-4 flexed, rotated/side bent right.  Left C4 tender point.  Head/OA joint: Side bent right/rotated left    OMT:  TREATMENTS IN PARENTHESES  Lumbar spine: L4-5 flexed, rotated/side bent right (Muscle energy, patient in lateral recumbent)  Sacrum: Decreased fascial motion to the left (Myofascial release, patient prone).  Innominate/pelvis: Anterior/inferior right, posterior/superior left (Muscle energy with the patient supine).  Thoracic spine: T2-5 flexed, rotated/side bent left (myofascial release with patient seated)  Rib cage: First rib elevated on the left (Myofascial release, patient supine)  Cervical spine: C3-4 flexed, rotated/side bent right.  (Myofascial release, patient supine) left C4 tender point (strain counterstrain technique with patient supine in the head side bent and rotated left).  Head/OA joint: Side bent right/rotated left (myofascial release-occipital tension release, patient supine)

## 2021-06-17 NOTE — PROGRESS NOTES
Patient just had an attempted left C3/4 TFESI that had to be aborted earlier this afternoon.  Dr. Gao advised patient to follow up with me to discuss next steps.  Pain has not changed since last visit on 4/12.  I explained that I would like to discuss this case with Dr. Gao before determining next step in plan of care.  I will discuss case with Dr. Gao by the end of the day tomorrow and I will have a nurse navigator call the patient with an update.  No charge for today's visit.

## 2021-06-17 NOTE — PROGRESS NOTES
Assessment:   Holly Wood is a 68 y.o. y.o. female with past medical history significant for GERD, macular degeneration, obesity who presents today for follow-up regarding pain.  Patient has had minimal relief of her neck pain and persistent headaches with medial branch blocks and facet joint injections as well as occipital nerve blocks.  She is here today specifically to try osteopathic manipulation.  She does have multiple areas of osteopathic somatic dysfunction.         Plan:     A shared decision making plan was used.  The patient's values and choices were respected.  The following represents what was discussed and decided upon by the physician and the patient.      1.  DIAGNOSTIC TESTS:  No further diagnostic tests are necessary at this time.    2.  PHYSICAL THERAPY: She should do physical therapy as directed by Vicki Ram.  3.  MEDICATIONS:  No changes to the medications today.    4.  INTERVENTIONS:  Osteopathic manipulation was performed to 7 areas today.  The patient tolerated the treatment well and reported no change in her symptoms for either better or worse..   Please see below for the osteopathic manipulation that was performed today.  5.  PATIENT EDUCATION:    - The patient was advised to rest today and drink plenty of water.  Normal activities can be resumed as tolerated tomorrow.    - The patient was told that soreness following the treatment is normal and should improve within a few days.  If the soreness is concerning, the clinic should be notified so further instructions can be given.  6.  FOLLOW-UP: The patient is asked to follow-up in approximately 1 week.  If she has any questions or concerns before that time she is encouraged to call the clinic..     Subjective:     Holly Wood is a 68 y.o. female who presents today for follow-up regarding neck pain.  She is here today specifically to try osteopathic manipulation and she declined to try further injections at this time.  She has is  located in the posterior aspect of her left neck.  It is in a very specific location.  It does not radiate into the top of the shoulder or down the arm.  She denies any numbness tingling or weakness in the arm.  She states that she is somewhat frustrated that nothing has provided relief.  The facet joint injections only provided mild relief in the occipital nerve blocks only provided mild relief.  She still has a significant amount of pain that she rates today at a 4 out of 10 that is constant.  Lifting and exercising aggravates the pain.  Really nothing is giving her significant relief.  She denies any new symptoms at this time.    Past medical history is reviewed and is unchanged in the interim.    Family history is reviewed and is unchanged in the interim.    Review of Systems:  Negative for numbness/tingling, weakness, bowel/bladder dysfunction, foot drop, headache, dizziness, nausea/vomiting, blurred vision, balance changes.     Objective:   CONSTITUTIONAL:  Vital signs as above.  No acute distress.  The patient is well nourished and well groomed.    PSYCHIATRIC:  The patient is awake, alert, oriented to person, place and time.  The patient is answering questions appropriately with clear speech.  Normal affect.  SKIN:  Skin over the face, posterior torso, bilateral upper and lower extremities is clean, dry, intact without rashes.  MUSCULOSKELETAL:  Gait is non-antalgic.  The patient is able to transfer independently.      OSTEOPATHIC STRUCTURAL EXAM:  Positive standing flexion test on the left.  Left inferior iliac crest.  Lumbar spine: L4-5 flexed, rotated/side bent left  Sacrum: Decreased sacral fascia movement.  Innominate: Left down slipped innominate, posterior/superior left  Thoracic spine: T1-3 flexed, rotated/side bent left  Rib cage: First rib elevated on the left.  Posterior ribs 1 through 3 on the left.  Cervical spine: C7 and C3 flexed, rotated/side bent right  Head/OA joint: Side bent right/rotated  left    OMT:  TREATMENTS IN PARENTHESES  Lumbar spine: L4-5 flexed, rotated/side bent left (Muscle energy, patient in lateral recumbent)  Sacrum: Decreased sacral fascia movement.  (Myofascial release, patient prone).  Innominate: Left down slipped innominate (Myofascial release, patient prone)., posterior/superior left (Muscle energy with the patient supine).  Thoracic spine: T1-3 flexed, rotated/side bent left (Myofascial release, patient supine)  Rib cage: First rib elevated on the left.  (Myofascial release, patient supine) posterior ribs 1 through 3 on the left.  (Myofascial release, patient supine)  Cervical spine: C7 and C3 flexed, rotated/side bent right impression facilitated positional release with patient supine)  Head/OA joint: Side bent right/rotated left (myofascial release- occipital release, patient supine)

## 2021-06-17 NOTE — PROGRESS NOTES
[facility] Certification Request    May 11, 2018      Patient: Holly Wood  MR Number: [id number]  YOB: 1950  Date of Visit: 5/11/2018      Dear Dr. Sylvia Young, DO:    Thank you for this referral.   We are seeing Holly Wood for Physical Therapy of Diagnosis:  Neck pain (M54.2)  Cervical facet syndrome (M12.88) .    Medicare and/or Medicaid requires physician review and approval of the treatment plan. Please review the plan of care and verify that you agree with the therapy plan of care by co-signing this note.      Plan of Care  Communication with: Referral Source  Patient Related Instruction: Nature of Condition;Treatment plan and rationale;Self Care instruction;Basis of treatment;Body mechanics;Posture;Precautions;Next steps;Expected outcome  Times per Week: 1  Number of Weeks: 2  Number of Visits: 2  Discharge Planning: orders from MD are for 2 visits so will discharge after 2 visits unless the patient needs more then I will ask for more visits  Precautions / Restrictions : none  Therapeutic Exercise: ROM;Stretching;Strengthening  Neuromuscular Reeducation: posture;core  Manual Therapy: soft tissue mobilization;myofascial release;joint mobilization;muscle energy;strain counterstrain;craniosacral therapy;visceral manipulation;other  Manual Therapy: neuro mobilization  Modalities: traction  Equipment: home traction unit    Goals:  Pt. will demonstrate/verbalize independence in self-management of condition in : 2 weeks;Comment  Comment:: The patient will understand how to use her home cervical traction unit and it will aid in decreasing her pain by 10 % or better.  Pt. will be independent with home exercise program in : 2 weeks  Patient Turn Head: for watching traffic;for computer;with partial ROM;with less pain;with less difficulty;in 2 weeks  Patient will look up / down: for reading;with partial ROM;with less pain;with less difficulty;in 2 weeeks        If  you have any questions or concerns, please don't hesitate to call.    Sincerely,      Aleksandra Rosado, PT        Physician recommendation:     ___ Follow therapist's recommendation        ___ Modify therapy      *Physician co-signature indicates they certify the need for these services furnished within this plan and while under their care.      Ambulatory referral to Physical Therapy     Patient:  Holly Wood MRN:  [id number]   [address]  [facility] [address] :  1950  SSN: [id number]   Phone: [phone] Sex:  F      Referring Provider Information:  VANNA BARRY Phone: [phone] Fax: [phone]      Referral Information:   Urgency:  Routine Referral Type: Physical Therapy [AE1]     Referral Reason: Evaluation and Treatment   Start Date: May 9, 2018 End Date:  To be determined by Insurer   Diagnosis:  Neck pain (M54.2)  Cervical facet syndrome (M12.88)   Refer to Dept:   Refer to Provider:      Diagnosis: Neck pain, likely cervical facet syndrome.  Language:  English  Contraindications/Precautions: None  Please eval for home cervical traction unit..    1-2 visits.  [facility]   Cervical Thoracic Initial Evaluation    Patient Name: Holly Wood  Date of evaluation: 2018  Referral Diagnosis: Neck pain  Referring provider: Iram Vasquez*  Visit Diagnosis:     ICD-10-CM    1. Cervicalgia M54.2        Assessment:    Chronic cervical spine pain with decreased cervical ROM and pain.  Poor posture and significantly tight fascia and variable spine position.  Pt. is appropriate for skilled PT intervention as outlined in the Plan of Care (POC).  Pt. is a good candidate for skilled PT services to improve pain levels and function.    Goals:  Pt. will demonstrate/verbalize independence in self-management of condition in : 2 weeks;Comment  Comment:: The patient will understand how to use her home cervical traction unit and it will aid in decreasing her  pain by 10 % or better.  Pt. will be independent with home exercise program in : 2 weeks  Patient Turn Head: for watching traffic;for computer;with partial ROM;with less pain;with less difficulty;in 2 weeks  Patient will look up / down: for reading;with partial ROM;with less pain;with less difficulty;in 2 weeeks  The goals were set in collaboration with the patient.    Patient's expectations/goals are realistic.    Barriers to Learning or Achieving Goals:  No Barriers.       Plan / Patient Instructions:    Do UE screen, Do UE nerve tension tests, check cervical spine joint mobility. Assess cervical retraction scapular retraction supine towel pectoralis stretch, deep cervical flexor strengthening. See if the patient has any specific questions on body mechanics or posture.  See how the patient did with the mechanical cervical traction.  Reassess cervical spine ROM.    Plan of Care:   Communication with: Referral Source  Patient Related Instruction: Nature of Condition;Treatment plan and rationale;Self Care instruction;Basis of treatment;Body mechanics;Posture;Precautions;Next steps;Expected outcome  Times per Week: 1  Number of Weeks: 2  Number of Visits: 2  Discharge Planning: orders from MD are for 2 visits so will discharge after 2 visits unless the patient needs more then I will ask for more visits  Precautions / Restrictions : none  Therapeutic Exercise: ROM;Stretching;Strengthening  Neuromuscular Reeducation: posture;core  Manual Therapy: soft tissue mobilization;myofascial release;joint mobilization;muscle energy;strain counterstrain;craniosacral therapy;visceral manipulation;other  Manual Therapy: neuro mobilization  Modalities: traction  Equipment: home traction unit    Plan for next visit: Assess response to home cervical traction.  See if the patient has any questions on posture and body mechanics and HEP,  Assess cervical spine position.      Subjective:         History of Present Illness:    Holly parada  68 y.o. female who presents to therapy today with complaints of last July ER due to pain left side of the neck and then went to PT with [facility] for 5 weeks minor help in pain.  Pain level was still a 3 or 4  Then went to  Dr. Strauss in January and had a MRI, she also had Injections in her occipital (C3,4)and ( C4, 5).  She reports seeing Dr Corona.[email or web address] and traction seems to help.  Tender C3,4,5 left side bending with extension in the cervical spine. Date of onset/duration of symptoms is 2017. Onset was gradual. Symptoms are intermittent and getting better. She reports  a constant  history of similar symptoms. She describes their previous level of function as limited with rotation, lifting, grooming, driving,  Back pain wakes her up every 2 hours, needs to use her hands to help her neck with turning in bed.  The patient reports that she works through the pain throughout her day.    Pain Ratin  Pain rating at best: 2  Pain rating at worst: 7 average is a 4/10 pain lately  Pain description: aching, pain, sharp and shooting    Functional limitations are described as occurring with:   lifting  looking up, looking down, looking at computer and turning head  reaching overhead and reaching overhead due to looking up with reaching  sleeping    Patient reports benefit from:  anti-inflammatory, injection:   type cortizone date(s)one month ago, ibuprophin traction,neck pillow         Objective:      Note: Items left blank indicates the item was not performed or not indicated at the time of the evaluation.    Patient Outcome Measures :    Neck Disability Score in %: 32   Scores range from 0-100%, where a score of 0% represents minimal pain and maximal function. The minmal clinically important difference is a score reduction of 10%.    Cervical Thoracic Examination  1. Cervicalgia       Involved side: Left  Posture Observation:      General sitting posture is  fair.  General standing posture is fair.  C3,4 right  T4-T6 right     Upper trap tight and fascia tight bilaterally  T2 on the left laterally  Cervical ROM:    Date:      *Indicate scale AROM AROM AROM   Cervical Flexion 3 fingers to the chest  Pain 2/10 pain     Cervical Extension 15 degrees 2/10 pain       Right Left Right Left Right Left   Cervical Sidebending         Cervical Rotation 49 26       Cervical Protraction      Cervical Retraction      Thoracic Flexion      Thoracic Extension      Thoracic Sidebending         Thoracic Rotation           Strength   5/5 MMT  Date:      Cervical Myotomes/5 Right Left Right Left Right Left   Cervical Flexion (C1-2)         Cervical Sidebending (C3)         Shoulder Elevation (C4) 5/5 5/5       Shoulder Abduction (C5) 5/5 5/5       Elbow Flexion (C6) 5/5 5/5       Elbow Extension (C7) 5/5 5/5       Wrist Flexion (C7) 5/5 5/5       Wrist Extension (C6) 5/5 5/5       Thumb abduction (C8)         Finger Abduction (T1)           Sensation   No complaints of numbness or tingling  Reflex Testing  Cervical Dermatomes Right Left UE Reflexes Right Left   Back of the Head (C2)   Biceps (C5-6)     Supraclavicular Fossa (C3)   Brachioradialis (C5-6)     AC Joint (C4)   Triceps (C7-8)     Lateral Biceps (C5)   John s test     Palmar Thumb (C6)   LE Reflexes     Palmar 3rd Finger (C7)   Patellar (L3-4)     Palmar 5th Finger (C8)   Achilles (S1-2)     Ulnar Forearm (T1)   Babinski Response         Flexibility:    Palpation:  C3,4 right rotated T4-T6 right rotated     Upper trap tight and fascia tight bilaterally  T2 on the left laterally tight and tender.        Passive Mobility-Joint Integrity: Not tested.  Patient notices decreased pain with distraction when supine with mechanical cervical traction with any weight from 12 pounds to 35 pounds.  Although she was a little stiff after the 35 pounds and I needed to do some gentle MFR to the upper trap to help decrease the tightness.  But the stiffness in the neck was improved per the  patient.  Cervical Special Tests     Cervical Special Tests Right Left UE Nerve Mobility Right Left   Cervical compression   Median nerve     Cervical distraction   Ulnar nerve     Spurling s test  Pain with rotation, and  sidebendin left and slight extension when the patient just moves did not add any compression Radial nerve     Shoulder abduction sign   Thoracic outlet     Deep neck flexor endurance test   Julian     Upper cervical rotation   Adson s     Sharper-Balta   Cervical rotation lateral flexion     Alar ligament test WNL WNL Other:     Other:vertebrobasilar artery test  WNL WNL but ROM limited due to pain Other:       UE Screen: Not tested.  Exercises:  Exercise #1: Added to the patient's HEP supine using deep cervical flexors to retract against the bed  Comment #1: 3-5 seconds 3-10 reps  Exercise #2: added to the patient's HEP supine towel pectoralis stretch as tolerated  Comment #2: 2 minutes  Exercise #3: discussed how to do the reach and roll she was already given she was give a few options if she wants to have the knees below at and above 90 degrees to work different areas of the thoracic spine.  Comment #3: 3 at each position  Exercise #4: discussed posture and body mechanics, how to do things without looking down such as cooking dishes reading proper positioning for computer work.  Exercise #5: dscussed  options for light massage for the upper traps bilaterally as well as deeper into the muscle massage and how her  could help with this.  Exercise #6: went over patient previous HEP and modified as needed.  The patient was doing cervical flexion with cervical rotation, scalene and SCM stretching, prone lifting head and shoulder, scapular retraction and scapular retraction with exercise band. supine cervical flexion.    Treatment Today     TREATMENT MINUTES COMMENTS   Evaluation 20    Self-care/ Home management     Manual therapy     Neuromuscular Re-education 10    Therapeutic Activity      Therapeutic Exercises 15    Gait training     Modality_____home cervical traction on for 1-2 minutes and off for 30 seconds from 12 pounds to 35 pounds_____________ 12,   2 minutes of set up and then 10 minutes of traction               Total 57    Blank areas are intentional and mean the treatment did not include these items.       PT Evaluation Code: (Please list factors)  Patient History/Comorbidities: none  Examination: chronic neck pain  Clinical Presentation: stable  Clinical Decision Making: low    Patient History/  Comorbidities Examination  (body structures and functions, activity limitations, and/or participation restrictions) Clinical Presentation Clinical Decision Making (Complexity)   No documented Comorbidities or personal factors 1-2 Elements Stable and/or uncomplicated Low   1-2 documented comorbidities or personal factor 3 Elements Evolving clinical presentation with changing characteristics Moderate   3-4 documented comorbidities or personal factors 4 or more Unstable and unpredictable High              Aleksandra Rosado PT  5/11/2018  8:51 AM

## 2021-06-17 NOTE — PROGRESS NOTES
Assessment:   Holly Wood (AKKALPANA Serrato) is a 71 y.o. y.o. female with past medical history significant for macular degeneration who presents today for follow-up regarding chronic bilateral low back pain thought to be sacroiliac joint pain in etiology.  Her pain has been worsening over the last several weeks, and she was too afraid to leave her house during the pandemic to come in for further treatment.  There is also thought to be an osteopathic somatic dysfunction as she does have multiple areas of osteopathic somatic dysfunction as listed below.  Her main complaint today though is right knee pain.  This is likely from primary osteoarthritis.  She is neurologically intact and without any red flag symptoms..      PSP:  Dr. Gao (transferred from [facility])       Plan:     A shared decision making plan was used.  The patient's values and choices were respected.  The following represents what was discussed and decided upon by the physician and the patient.      1.  DIAGNOSTIC TESTS:  No further diagnostic tests are necessary at this time.    -She has had x-rays of her knees in May 2019 and x-rays of her pelvis/hips in July 2018.  2.  PHYSICAL THERAPY: She was offered a refresher course of physical therapy but she declined.  The patient has previously completed physical therapy in the fall 2020 and is encouraged to continue doing the home exercise program daily.    3.  MEDICATIONS:  No changes to the medications today.    -She can continue with the Elavil 25 mg at bedtime.  -Can continue with the gabapentin 300 mg at bedtime.  -She has been taking aspirin as needed and she does feel like this is helpful as well.  4.  INTERVENTIONS: She is deemed appropriate for osteopathic manipulation.  Osteopathic manipulation was performed to 7 areas today.  The patient tolerated the treatment well and reported relief immediately afterwards.   Please see below for the osteopathic manipulation that was performed today.  -She  would like to have a right knee corticosteroid injection.  This is ordered under ultrasound guidance with Dr. Beltre.  -Reports that her second dose of the Covid vaccine was mid April.  She does not member the exact date, but states that it has been well over 2 weeks since the second dose.  5.  PATIENT EDUCATION:    - The patient was advised to rest today and drink plenty of water.  Normal activities can be resumed as tolerated tomorrow.    - The patient was told that soreness following the treatment is normal and should improve within a few days.  Ice should be used (as opposed to heat) if soreness occurs.  If the soreness is concerning, the clinic should be notified so further instructions can be given.  6.  FOLLOW-UP: The patient is asked to follow-up 2 weeks after her right knee injection.. If there are any questions/concerns or any significant worsening of pain prior to that time, the patient is asked to call the clinic via the nurse navigation line or via [employer].     Subjective:     Holly Wood (AKA Ama) is a 71 y.o. female who presents today for follow-up regarding multiple areas of pain.  Her chronic bilateral low back pain has been worsening.  She reports that she did not return after her last visit on October 7 of 2020 because she is very afraid of the pandemic.  She reports that she is very deyvi and she was able to have all of her groceries delivered and she is able to manage her pain on her own, so she did not feel safe coming out until now.  She reports that largely her pain has been manageable but recently, her back pain has started to bother her more and she is having significant pain in her right knee that is consistent with right knee osteoarthritis.  She rates her pain today at a 4 out of 10.  At worst is a 7 out of 10.  At best is a 2 out of 10.  Her pain is worse with lifting, walking, standing.  She does feel better with stretching.  She denies any new symptoms.  She is managing her  pain with aspirin 650 mg twice a day.  She also takes the gabapentin 300 mg at bedtime as well as Elavil 25 mg at bedtime.  Her right knee pain is starting to significantly impair her walking.  She would like to be more active now that the weather is nice.  She is wondering about a corticosteroid injection into the knee.    Past medical history is reviewed and is unchanged in the interim.    Family history is reviewed and is unchanged in the interim.    Review of Systems: Positive for pain that makes it difficult to sleep at night..  Pertinent negatives include no numbness, tingling, weakness, fevers, chills, unexplained weight loss, bowel incontinence, bladder incontinence, trips, stumbles, falls.  All others reviewed and are negative.     Objective:   CONSTITUTIONAL:  Vital signs as above.  No acute distress.  The patient is well nourished and well groomed.    PSYCHIATRIC:  The patient is awake, alert, oriented to person, place and time.  The patient is answering questions appropriately with clear speech.  Normal affect.  SKIN:  Skin over the face, posterior torso, bilateral upper and lower extremities is clean, dry, intact without rashes.  MUSCULOSKELETAL:  Gait is non-antalgic.  The patient is able to transfer independently.  She does have some joint line tenderness.    OSTEOPATHIC STRUCTURAL EXAM:  Standing flexion test on the right.  Right inferior iliac crest.  Lumbar spine: L4-5 flex, rotated/side bent left  Sacrum: Right unilateral sacral flexion  Innominates/Pelvis: Right down slipped innominate, anterior/inferior right  Thoracic spine: T1 flexed, rotated/side bent right  Rib cage: Rib elevated on the left  Cervical spine: Left C2 tender point.  Head/OA joint: Sidebent right/rotated left    OMT:  TREATMENTS IN PARENTHESES  Lumbar spine: L4-5 flex, rotated/side bent left  (Muscle energy, patient in lateral recumbent)  Sacrum: Right unilateral sacral flexion (Myofascial release, patient  prone).  Innominates/Pelvis: Right down slipped innominate (Myofascial release, patient prone)., anterior/inferior right  (Muscle energy with the patient supine).  Thoracic spine: T1 flexed, rotated/side bent right (Myofascial release, patient prone).  Rib cage: Rib elevated on the left (Myofascial release, patient prone).  Cervical spine: Left C2 tender point.  (Strain/counterstrain, patient supine with the neck flexed, slightly side bent left and slightly rotated left)  Head/OA joint: Sidebent right/rotated left (Myofascial release, patient supine)

## 2021-06-18 NOTE — PROGRESS NOTES
[facility] Daily Progress     Patient Name: Holly Wood  Date: 5/18/2018  Visit #: 2  PTA visit #:  0  Referral Diagnosis: cervicalgia, cervical facet syndrome, neck pain  Referring provider: Iram Vasquez*  Visit Diagnosis:     ICD-10-CM    1. Cervicalgia M54.2    2. Cervical facet syndrome M12.88    3. Neck pain M54.2          Assessment:     HEP/POC compliance is  good .  Patient demonstrates understanding/independence with home program.  The patient had 2 visits ordered She was able to understand how to use the cervical traction unit and she understands that she should not have pain and how to modify her HEP as needed and how to modify her ADL's and posture and body mechanics as needed to help with her pain.  Goal Status:  Pt. will demonstrate/verbalize independence in self-management of condition in : 2 weeks;Comment  Comment:: The patient will understand how to use her home cervical traction unit and it will aid in decreasing her pain by 10 % or better. the patiwent reported that her pain was about 20% better after the traction.  Pt. will be independent with home exercise program in : 2 weeks  Patient Turn Head: for watching traffic;for computer;with partial ROM;with less pain;with less difficulty;in 2 weeks;Not Met  Patient will look up / down: for reading;with partial ROM;with less pain;with less difficulty;in 2 weeeks;Progressing toward      Plan / Patient Education:     patient was only scheduled for 2 visits  I will hod onto her chart for 6 weeks she is to get a cervical traction until and will continue with her MD if further PT is ordered we will continue at that time.    Subjective:     Pain Rating: 3  The patient reports being sore for 2 days after her last visit.  She then had a really good day and then was sore after looking at birds.        Objective:   Cervical ROM:    Date:  After massage 5/18/18    *Indicate scale AROM AROM AROM   Cervical Flexion 3 fingers to the  chest  Pain 2/10 pain 2 fingers to the chest  Tight 1/10  1 3/4   Cervical Extension 15 degrees 2/10 pain  19 degrees 1/10 pain 28    Right Left Right Left Right Left   Cervical Sidebending         Cervical Rotation 49 26 46 20 48 20     The patient had tightness in her right paraspinals and deep in her cervical flexors on the left side.  When doing cervical extension prone which was a previous HEP she only had minimal tightening on the left side the right was really tight but with gentle tactile input she was able to get the left side to fire better.  The patient's  was taught how to gently massage her upper back and anterior chest as well as the suboccipitals and deep cervical flexors.  The patient was given the [place] CT junction stretch and was able to do this well.      Treatment Today     TREATMENT MINUTES COMMENTS   Evaluation     Self-care/ Home management     Manual therapy 30 Did MFR to upper trap, thoracic inlet release, suboccipital release, MFR to the deep cervical flexors.  hyoid release.  The patient's  was taught how to gently massage her upper back and anterior chest as well as the suboccipitals and deep cervical flexors.    Neuromuscular Re-education     Therapeutic Activity     Therapeutic Exercises 5 CT junction self mobs   Gait training     Modality_saunders home cervical traction did 12 pounds 18 pounds and 22 pounds today at around 1-2 minutes each with 30 rest in between________ 8 no charge as I have manual therapy charge               Total 43    Blank areas are intentional and mean the treatment did not include these items.       Aleksandra Rosado  PT  5/18/2018

## 2021-06-18 NOTE — PROGRESS NOTES
Assessment:   Holly Wood is a 68 y.o. y.o. female with past medical history significant for macular degeneration who presents today for follow-up regarding continued chronic left-sided neck pain without radicular symptoms.  She states that her symptoms have remained stable at this time.  She is doing significantly better from prior to the start of her osteopathic manipulation but has not noticed any significant improvement since her last treatment.  The pain is at a level where she is able to be active and to function and where medications can control the pain.  She has multiple areas of osteopathic somatic dysfunction today as listed below..         Plan:     A shared decision making plan was used.  The patient's values and choices were respected.  The following represents what was discussed and decided upon by the physician and the patient.      1.  DIAGNOSTIC TESTS:  No further diagnostic tests are necessary at this time.    2.  PHYSICAL THERAPY: No further physical therapy at this time.  The patient has previously completed physical therapy and is encouraged to continue doing the home exercise program daily.    3.  MEDICATIONS:  No changes to the medications today.    4.  INTERVENTIONS:  Osteopathic manipulation was performed to 7 areas today.  The patient tolerated the treatment well and she reported relief immediately afterwards..   Please see below for the osteopathic manipulation that was performed today.  5.  PATIENT EDUCATION:    - The patient was advised to rest today and drink plenty of water.  Normal activities can be resumed as tolerated tomorrow.    - The patient was told that soreness following the treatment is normal and should improve within a few days.  If the soreness is concerning, the clinic should be notified so further instructions can be given.  -The patient was encouraged to continue doing her home exercise program on a regular basis.  Discussed with the patient that if cervical  traction made her pain worse, she should avoid using cervical traction and should not get a home traction unit.  -Discussed with the patient that if she continues to be stable at the next visit, the next appointment will be 4 weeks out.  Ideally she would be able to go a minimum of 6-8 weeks in between appointments.  6.  FOLLOW-UP: The patient is asked to follow-up in approximately 3 weeks.  If she has any questions or concerns before that time she is encouraged to call the clinic..     Subjective:     oHlly Wood is a 68 y.o. female who presents today for follow-up regarding left sided neck pain.  The patient reports that her pain has been under good control since her last visit on May 23 of 2018.  She has not noticed continued improvement since the last treatment, but overall she is doing quite well and she is very pleased.  She states that she is able to be more active.  She no longer has a stabbing pain in the left neck.  She no longer has pain in the morning when she wakes up.  The pain typically worsens as the day goes on.  She does take ibuprofen in the morning and then aspirin at bedtime to help with the pain.  She rates her pain today at a 2 out of 10.  At best it is a 2 out of 10.  At worst it is a 4 out of 10.  Lifting, too much exercise, turning her neck at certain angles will aggravate the pain.  But again the osteopathic manipulation helps as does aching ibuprofen and the aspirin.  The patient says her physical therapist contacted her to see if she wanted to do a cervical traction unit.  The patient states that when she tried cervical traction and physical therapy it actually aggravated the neck pain.  She is hesitant to get a home traction unit given that it made her symptoms worse.    Past medical history is reviewed and is unchanged in the interim.    Family history is reviewed and is unchanged in the interim.    Review of Systems:  Negative for numbness/tingling, weakness, bowel/bladder  dysfunction, foot drop, headache, dizziness, nausea/vomiting, blurred vision, balance changes.     Objective:   CONSTITUTIONAL:  Vital signs as above.  No acute distress.  The patient is well nourished and well groomed.    PSYCHIATRIC:  The patient is awake, alert, oriented to person, place and time.  The patient is answering questions appropriately with clear speech.  Normal affect.  SKIN:  Skin over the face, posterior torso, bilateral upper and lower extremities is clean, dry, intact without rashes.  MUSCULOSKELETAL:  Gait is non-antalgic.  The patient is able to transfer independently.      OSTEOPATHIC STRUCTURAL EXAM:  Symmetric iliac crest.  Negative standing flexion test.  Lumbar spine: L3-5 flexed, rotated/side bent left  Sacrum: Left unilateral sacral flexion  Innominate: Anterior/inferior right, posterior/superior left  Thoracic spine: T5-7 flexed, rotated/side bent left  Rib cage: Decreased motion over ribs 2 through 5 bilaterally.  First rib elevated on the left.  Cervical spine: C6-7 flexed, rotated/side bent right  Head/OA joint: Side bent right/rotated left    OMT:  TREATMENTS IN PARENTHESES  Lumbar spine: L3-5 flexed, rotated/side bent left (Muscle energy, patient in lateral recumbent)  Sacrum: Left unilateral sacral flexion (Myofascial release, patient prone).  Innominate: Anterior/inferior right, posterior/superior left (Muscle energy with the patient supine).  Thoracic spine: T5-7 flexed, rotated/side bent left (myofascial release with patient seated)  Rib cage: Decreased motion over ribs 2 through 5 bilaterally (Myofascial release, patient in the lateral recumbent position)..  First rib elevated on the left.  (Myofascial release, patient supine)  Cervical spine: C6-7 flexed, rotated/side bent right (Muscle energy with the patient supine).  Head/OA joint: Side bent right/rotated left (Muscle energy with the patient supine).

## 2021-06-18 NOTE — PROGRESS NOTES
Assessment:   Holly Wood is a 68 y.o. y.o. female with past medical history significant for GERD, macular degeneration, obesity who presents today for follow-up regarding left sided neck pain.  The patient continues to note significant improvement with a combination of osteopathic manipulation and physical therapy.  Her left-sided neck pain is much more tolerable and it has allowed her to do further activities.  She denies any radicular symptoms at this time.  She continues to have multiple areas of osteopathic somatic dysfunction as listed below.  Without red flag or myelopathic symptoms.       Plan:     A shared decision making plan was used.  The patient's values and choices were respected.  The following represents what was discussed and decided upon by the physician and the patient.      1.  DIAGNOSTIC TESTS: The patient's MRI of the cervical spine was reviewed today.  No further diagnostic testing necessary at this time.  2.  PHYSICAL THERAPY: The patient can complete physical therapy as ordered.  Encouraged to continue doing her home exercise program on a regular basis.  3.  MEDICATIONS: No changes were made to her medications.  At this time she can continue to take over-the-counter analgesics as needed.  4.  INTERVENTIONS: Osteopathic manipulation was performed to 7 areas today.  She tolerated the treatment well and reported relief immediately afterwards.  Please see below for the osteopathic manipulation was performed today.  5.  PATIENT EDUCATION:    -Patient was asked to rest today and drink plenty of water.  -Patient was advised that she may have some soreness after the treatment.  This is normal and should resolve within a few days.  She can use over-the-counter analgesics and ice if needed.  If the soreness is concerning to her, she is asked to call the clinic.  6.  FOLLOW-UP: The patient is welcome to follow-up in approximately 2 weeks.  If she has any questions or concerns before that time she  is encouraged to call the clinic.    Subjective:     Holly Wood is a 68 y.o. female who presents today for follow-up regarding left-sided neck pain.  The patient has had several treatments of osteopathic manipulation and she feels that between that and physical therapy she is feeling significantly better.  She reports that she has less pain in the mornings that she previously did.  She is able to do more activities now than she could before.  She feels like her posture is improving.  The pain is located just the left side of her neck.  It does not radiate down the arm.  She denies any numbness tingling or weakness in the arms.  No headaches.  She rates her pain at a 2 out of 10.  At best it is a 2 out of 10.  At worst it is a 6 out of 10.  The pain is worse with lifting, exercising, gardening, bike riding, at the end of the day.  However the pain is manageable.  She does feel better with doing her PT exercises and with osteopathic manipulation.  She is overall quite pleased.  She has been trying to lose weight and has lost 10 pounds in the last 5-1/2 months.    Past medical history is reviewed and is unchanged for any new medical diagnoses in the interim.      Family history is reviewed and is unchanged in the interim.        Review of Systems:  Negative for numbness/tingling, weakness, bowel/bladder dysfunction, foot drop, headache, dizziness, nausea/vomiting, blurred vision, balance changes.     Objective:   CONSTITUTIONAL:  Vital signs as above.  No acute distress.  The patient is well nourished and well groomed.    PSYCHIATRIC:  The patient is awake, alert, oriented to person, place and time.  The patient is answering questions appropriately with clear speech.  Normal affect.  SKIN:  Skin over the face, posterior torso, bilateral upper and lower extremities that is exposed from her T-shirt and shorts is clean, dry, intact without rashes.  Musculoskeletal: The patient can transfer independently.  There is  no gross imbalance with movement.  She does have significant tenderness over the left lumbar paraspinal muscles and over the left cervical paraspinal muscles.    OSTEOPATHIC STRUCTURAL EXAM:  Symmetric iliac crest.  Lumbar spine: L4-5 flexed, rotated/side bent left  Sacrum: Right on right forward sacral torsion  Innominate: Anterior/inferior right, posterior/superior left  Thoracic spine: T1-3 flexed, rotated/side bent left  Rib cage: Decreased motion over ribs 2 through 5 on the left.  First rib elevated on the left.  Cervical spine: C3-5 flexed, rotated/side bent right  Head/OA joint: Side bent right/rotated left    OMT:  TREATMENTS IN PARENTHESES  Lumbar spine: L4-5 flexed, rotated/side bent left (myofascial release with the patient prone)  Sacrum: Right on right forward sacral torsion (Myofascial release, patient prone).  Innominate: Anterior/inferior right, posterior/superior left (Muscle energy with the patient supine).  Thoracic spine: T1-3 flexed, rotated/side bent left (myofascial release with the patient seated)  Rib cage: Decreased motion over ribs 2 through 5 on the left.  (Myofascial release, patient in the lateral recumbent position).  First rib elevated on the left.  (Myofascial release, patient supine)  Cervical spine: C3-5 flexed, rotated/side bent right (Muscle energy with the patient supine).  Head/OA joint: Side bent right/rotated left (Muscle energy with the patient supine).

## 2021-06-19 NOTE — PROGRESS NOTES
Assessment:   Holly Wood is a 68 y.o. y.o. female with past medical history significant for macular degeneration who presents today for follow-up regarding left hip pain.  She has had x-rays of her hips which show mild osteoarthritis which is thought to be contributing to her pain in addition to left greater trochanteric bursitis.  She also seems to have an element of left IT band syndrome which may be playing a role in her left knee pain.  She also has chronic left-sided low back pain without radicular/sciatica type symptoms.  This is likely from lumbar facet syndrome.  She continues to have multiple areas of osteopathic somatic dysfunction.  She notices good control of her neck pain with osteopathic manipulation.  She is without any red flag symptoms at this time.       Plan:     A shared decision making plan was used.  The patient's values and choices were respected.  The following represents what was discussed and decided upon by the physician and the patient.      1.  DIAGNOSTIC TESTS:    -The patient's x-rays of the bilateral hips were personally reviewed today and the physician performed her own interpretation.  The findings were explained and discussed with the patient.  - No further diagnostic tests are necessary at this time.    2.  PHYSICAL THERAPY: An order for physical therapy to have the patient work on the low back pain in addition to the hip pain with a new diagnosis of IT band syndrome was provided today.  She would like to go to the [facility] rehab.  She is encouraged to schedule physical therapy before her upcoming trip.  Even if you visits will teach her exercises that she can begin doing to help decrease the pain.  3.  MEDICATIONS:  No changes to the medications today.    4.  INTERVENTIONS:  Osteopathic manipulation was performed to 7 areas today.  The patient tolerated the treatment well and reported relief immediately afterwards..   Please see below for the osteopathic  manipulation that was performed today.  5.  PATIENT EDUCATION:    - The patient was advised to rest today and drink plenty of water.  Normal activities can be resumed as tolerated tomorrow.    - The patient was told that soreness following the treatment is normal and should improve within a few days.  If the soreness is concerning, the clinic should be notified so further instructions can be given.  -All of her questions were answered to her satisfaction today.  She was in agreement with the treatment plan.  6.  FOLLOW-UP: The patient is asked to follow-up in approximately 8-12 weeks after she returns from her trip.  If she has any questions or concerns before that time she is encouraged to contact the clinic..     Subjective:     Holly Wood is a 68 y.o. female who presents today for follow-up regarding left hip pain thought to be greater trochanteric bursitis.  She did undergo the x-rays that were ordered at the last visit.  She would like to review the results.  The patient continues to have left-sided low back pain.  She has had this pain for over 30 years.  She then gets pain near the left greater trochanteric bursa.  She also has pain in the left knee, located anteriorly.  She states that her left-sided neck pain has been under good control with osteopathic manipulation.  At this time she rates her neck pain at a 2 out of 10.  The back and hip pain she rates it a 3 out of 10.  At best the neck pain is a 1 out of 10.  At worst the neck pain is a 4 out of 10.  At best the back and hip pain is a 2 out of 10.  At worst the back and hip pain is a 6 out of 10.  Her back and hip pain is worse with lifting, laying down, increased exercise.  She does feel better with rest.  She denies any new symptoms at this time.    Past medical history is reviewed and is unchanged in the interim.    Family history is reviewed and is unchanged in the interim.    Review of Systems:  Negative for numbness/tingling, weakness,  bowel/bladder dysfunction, foot drop, headache, dizziness, nausea/vomiting, blurred vision, balance changes.     Objective:   CONSTITUTIONAL:  Vital signs as above.  No acute distress.  The patient is well nourished and well groomed.    PSYCHIATRIC:  The patient is awake, alert, oriented to person, place and time.  The patient is answering questions appropriately with clear speech.  Normal affect.  SKIN:  Skin over the face, posterior torso, bilateral upper and lower extremities is clean, dry, intact without rashes.  MUSCULOSKELETAL:  Gait is non-antalgic.  The patient is able to transfer independently.  The patient has significant tenderness over the left greater trochanteric bursa.  She also has exquisite tenderness over the left IT band.  Mild tenderness over the left lower lumbar paraspinal muscles.    OSTEOPATHIC STRUCTURAL EXAM:  Positive standing flexion test on the left.  Left superior iliac crest.  Lumbar spine: L5 rotated left  Sacrum: Decreased motion over the base of the sacrum.  Innominate: Anterior/inferior right, posterior/superior left  Thoracic spine: T10-12 flexed, rotated/side bent left  Rib cage: First rib elevated on the left  Cervical spine: C3-5 flexed, rotated/side bent right  Head/OA joint: Side bent right/rotated left    OMT:  TREATMENTS IN PARENTHESES  Lumbar spine: L5 rotated left (myofascial release with patient supine followed by gentle still technique with patient supine)  Sacrum: Decreased motion over the base of the sacrum.  (Myofascial release with patient supine-indirect technique)  Innominate: Anterior/inferior right, posterior/superior left (Muscle energy with the patient supine).  Thoracic spine: T10-12 flexed, rotated/side bent left (Muscle energy, patient seated)  Rib cage: First rib elevated on the left (Myofascial release, patient supine)  Cervical spine: C3-5 flexed, rotated/side bent right (Muscle energy with the patient supine).  Head/OA joint: Side bent right/rotated  left (Myofascial release, patient supine)

## 2021-06-19 NOTE — PROGRESS NOTES
[facility]   [facility]-Pelvic Initial Evaluation    Patient Name: Holly Wood  Date of evaluation: 8/17/2018  Referral Diagnosis: Primary osteoarthritis of left hip  Referring provider: Iram Vasquez*  Visit Diagnosis:     ICD-10-CM    1. Chronic midline low back pain without sciatica M54.5     G89.29    2. Chronic hip pain, left M25.552     G89.29    3. Generalized muscle weakness M62.81        Assessment:      Impairments in  pain, posture, ROM, joint mobility, strength, motor function, ADL's, gait/locomotion and balance  The POC is dynamic and will be modified on an ongoing basis.  Barriers to achieving goals as noted in the assessment section may affect outcome.  Prognosis to achieve goals is  good   Plan of care and goals were established in collaboration with patient.     Pt presents with a chronic, 25 year history of low back, left hip and knee pain. Her pain has progressively worsened recently into her hip and knee. It prevents her from sleeping on her side, walking, standing, bending and lifting. She has tenderness along her L greater trochanter and piriformis, pain with lumbar lateral flexion, LE/abdominal weakness and pain with hip PROM on L.    Goals:  Pt. will be independent with home exercise program in : in other weeks;Met in this session  Other weeks: 1 week  No Data Recorded    Patient's expectations/goals are realistic.    Barriers to Learning or Achieving Goals:  Chronicity of the problem.       Plan / Patient Instructions:        Plan of Care:   Authorization / Certification Start Date: 08/17/18  Authorization / Certification End Date: 08/17/18  Authorization / Certification Number of Visits: 1  Communication with: Referral Source  Patient Related Instruction: Nature of Condition;Treatment plan and rationale;Basis of treatment;Self Care instruction;Posture;Precautions;Next steps;Body mechanics;Expected outcome  Times per Week: 1  Number of Weeks: 1  Number of Visits:  "1  Discharge Planning: Pt leaving for a 2 month trip, Educated to obtain new PT orders when she returned if needed  Therapeutic Exercise: ROM;Stretching;Strengthening  Neuromuscular Reeducation: kinesio tape;posture;balance/proprioception;TNE;core  Equipment: theraband    Plan for next visit: Pt going on 2 month vacation. Pt will get new orders for PT when she returns.      Subjective:         Social information:   Occupation:retired   Work Status:NA      History of Present Illness:    Holly is a 68 y.o. female who presents to therapy today with complaints of aurelio low back pain, L hip pain and L knee pain. Date of onset/duration of symptoms is since . She \"has had this pain for 25 years\". There are years where the pain is not so bad. Over 15 years ago she did complete physicians neck and back. Laying on her left side for sleeping has gotten worse. Overall, \"from top to bottom\" her pain is worse from arthritis. She does walk and bike during the day.    Pain Ratin  Pain rating at best: 3  Pain rating at worst: 7  Pain description: stabbing pains into her L knee    Functional limitations are described as occurring with:   Stand 5 min  Sit 5 min  Walk 5 min  Sleep- wakes 4x/night  Transitions  Change sleeping positions  Lift  Stairs  Kneel/squat  Yard work and household work  Wash/bath/shower  Carry laundry/groceries  Look up/down/turn head  /hold objects    Patient reports benefit from:  cold, pillows, positions         Objective:      Note: Items left blank indicates the item was not performed or not indicated at the time of the evaluation.    Patient Outcome Measures :    Modified Oswestry Low Back Pain Disablity Questionnaire  in %: 46   Scores range from 0-100%, where a score of 0% represents minimal pain and maximal function. The minimal clinically important difference is a score reduction of 12%.    Examination  1. Chronic midline low back pain without sciatica     2. Chronic hip pain, left     3. " Generalized muscle weakness       Involved side: Left  Posture Observation:   Standing: symmetrical iliac crest    Lumbar ROM:  *severe pain when returning from standing side bend stretch to R  Date: 8/17/2018     *Indicate scale AROM AROM AROM   Lumbar Flexion To ankles     Lumbar Extension mild      Right Left Right Left Right Left   Lumbar Sidebending mod mod       Lumbar Rotation mild mild       Thoracic Flexion      Thoracic Extension      Thoracic Sidebending         Thoracic Rotation           Lower Extremity Strength:     Date: 8/17/2018     LE strength/5 Right Left Right Left Right Left   Hip Flexion (L1-3) 3+ 3+       Hip Extension (L5-S1) weakness with bridge weakness with bridge       Hip Abduction (L4-5)  pain       Hip Adduction (L2-3)         Hip External Rotation         Hip Internal Rotation         Knee Extension (L3-4) 5 5       Knee Flexion         Ankle Dorsiflexion (L4-5) 5 5       Great Toe Extension (L5) 5 5       Ankle Plantar flexion (S1)         Abdominals        Sensation    Did not assess       Reflex Testing  Lumbar Dermatomes Right Left UE Reflexes Right Left   Iliac Crest and Groin (L1)   Biceps (C5-6)     Anterior Medial Thigh (L2)   Brachioradialis (C5-6)     Anterior Thigh, Medial Epicondyle Femur (L3)   Triceps (C7-8)     Lateral Thigh, Anterior Knee, Medial Leg/Malleolus (L4)   John s test     Lateral Leg, Dorsal Foot (L5)   LE Reflexes     Lateral Foot (S1)   Patellar (L3-4)     Posterior Leg (S2)   Achilles (S1-2)     Other:   Babinski Response       Palpation:tender at L greater trochanter and along L piriformis    Lumbar Special Tests:     Lumbar Special Tests Right Left SI Tests Right  Left   Quadrant test - - SI Compression     Straight leg raise - - SI Distraction     Crossover response   POSH Test     Slump - - Sacral Thrust     Sit-up test  FADIR - -   Trunk extensor endurance test  Resisted Abduction     Prone instability test  Other:JOSEPH - -   Pubic shotgun   Other:Scour - -       LE Screen:  pain with L hip PROM ER and flexion    Treatment Today     TREATMENT MINUTES COMMENTS   Evaluation 20 -lumbar spine   Self-care/ Home management     Manual therapy     Neuromuscular Re-education     Therapeutic Activity     Therapeutic Exercises 30 -see exercise flow sheet  -educated on POC, diagnosis and HEP  -educated on pain management techniques while on her trip  -extensive review of previous HEP and new exercises as pt will not be able to return to PT (she is going on vacation for 2 months)  -educated on option for pool therapy and pool for aerobic exercise to help reduce chronic low back pain   Gait training     Modality__________________                Total 50    Blank areas are intentional and mean the treatment did not include these items.     PT Evaluation Code: (Please list factors)  Patient History/Comorbidities: see above  Examination: lumbar spine  Clinical Presentation: stable  Clinical Decision Making: low    Patient History/  Comorbidities Examination  (body structures and functions, activity limitations, and/or participation restrictions) Clinical Presentation Clinical Decision Making (Complexity)   No documented Comorbidities or personal factors 1-2 Elements Stable and/or uncomplicated Low   1-2 documented comorbidities or personal factor 3 Elements Evolving clinical presentation with changing characteristics Moderate   3-4 documented comorbidities or personal factors 4 or more Unstable and unpredictable High                Shelby Keith, PT, DPT  8/17/2018  9:08 AM

## 2021-06-19 NOTE — PROGRESS NOTES
Assessment:   Holly Wood is a 68 y.o. y.o. female with past medical history significant for macular degeneration who presents today for follow-up regarding sided neck pain without radicular symptoms.  The patient has a new complaint today of left hip pain greater than right hip pain, which may be from hip osteoarthritis.  Left greater trochanteric bursitis is also in the differential diagnosis.  She continues to have multiple areas of osteopathic somatic dysfunction.  She is noticing significant relief of the neck pain with osteopathic manipulation, but notices that she is having more and more pain in the left hip area.  He is without any red flag symptoms..         Plan:     A shared decision making plan was used.  The patient's values and choices were respected.  The following represents what was discussed and decided upon by the physician and the patient.      1.  DIAGNOSTIC TESTS: X-rays of the hips are ordered today.  The patient will be contacted by phone if there are any concerning/urgent findings on her x-rays.  Otherwise Dr. Gao will review these x-rays at her next visit in approximately 4 weeks.  2.  PHYSICAL THERAPY: Patient may benefit from physical therapy for the hip specifically.  Will hold off on this for now.  She is encouraged to continue doing her exercises for her neck at this time.  3.  MEDICATIONS:  No changes to the medications today.    4.  INTERVENTIONS:  Osteopathic manipulation was performed to 7 areas today.  The patient tolerated the treatment well and she reported relief immediately afterwards..   Please see below for the osteopathic manipulation that was performed today.  5.  PATIENT EDUCATION:    - The patient was advised to rest today and drink plenty of water.  Normal activities can be resumed as tolerated tomorrow.    - The patient was told that soreness following the treatment is normal and should improve within a few days.  If the soreness is concerning, the clinic  should be notified so further instructions can be given.  -All of her questions were answered to her satisfaction today.  She was in agreement with the treatment plan.  6.  FOLLOW-UP: The patient is asked to follow-up in approximately 4 weeks.  She has any questions, concerns, or any significant worsening of her symptoms prior to that time she is encouraged to call the clinic..     Subjective:     Holly Wood is a 68 y.o. female who presents today for follow-up regarding sided neck pain without radicular symptoms.  The patient reports that her neck pain continues to improve.  She rates it today at a 1 out of 10.  At best it is a 1 out of 10.  At worst it is a 2 out of 10.  It is just in the left side of her neck.  It does not radiate down the arm.  She denies any numbness tingling or weakness in the arm.  Current neck pain is worse with heavy exercise such as gardening.  Lifting also aggravates pain.  She does feel better with icing and with O MM.  She is not taking any prescription pain medications for this pain at this time.  Is very pleased with how much relief she has with osteopathic manipulation.  She does have a new complaint today of left hip pain, though this is just posterior to the left greater trochanteric bursa.  This has been worsening recently, and she thinks is due to her increased activities.  It does not significantly radiate down the leg.  She denies any numbness tingling or weakness in the left leg.  The left hip pain is worse when she sleeps on her left side.  Seems to be worse when she pushes over that area.  It feels a little bit better with rest and when she does not push over the area.    Past medical history is reviewed and is unchanged in the interim.    Family history is reviewed and is unchanged in the interim.    Review of Systems:  Negative for numbness/tingling, weakness, bowel/bladder dysfunction, foot drop, headache, dizziness, nausea/vomiting, blurred vision, balance  changes.     Objective:   CONSTITUTIONAL:  Vital signs as above.  No acute distress.  The patient is well nourished and well groomed.    PSYCHIATRIC:  The patient is awake, alert, oriented to person, place and time.  The patient is answering questions appropriately with clear speech.  Normal affect.  SKIN:  Skin over the face, posterior torso, bilateral upper and lower extremities is clean, dry, intact without rashes.  MUSCULOSKELETAL:  Gait is non-antalgic.  The patient is able to transfer independently.  The patient does have tenderness just posterior to the left greater trochanter.  She does have some tenderness over the left lateral gluteal muscle.  She does have restricted range of motion of the left hip with flexion, internal/external rotation.  She does report pain with hip range of motion testing.  Fabere's test on the left side causes significant pain localizing to the left lateral hip.  With right hip range of motion testing she does have less restriction with flexion, internal/external rotation.  She does not have significant pain.  She does report some discomfort with Fabere's test localizing to the right lateral hip, but not as severe as on the left side.    OSTEOPATHIC STRUCTURAL EXAM:  Positive standing flexion test on the left.  Left superior iliac crest.  Lumbar spine: L4-5 flexed, rotated/side bent left  Sacrum: Decreased fascial motion (caudally) over the sacrum.  Innominate: Left up slipped innominate, posterior/superior left  Thoracic spine: T 8-9 flexed, rotated/side bent left  Rib cage: First rib elevated on the left  Cervical spine: C7 flexed, rotated/side bent right  Head/OA joint: Side bent right/rotated left    OMT:  TREATMENTS IN PARENTHESES  Lumbar spine: L4-5 flexed, rotated/side bent left (Muscle energy, patient in lateral recumbent)  Sacrum: Decreased fascial motion (caudally) over the sacrum.  (Myofascial release, patient supine)  Innominate: Left up slipped innominate, (Still  technique, patient prone).  Posterior/superior left (Muscle energy with the patient supine).  Thoracic spine: T 8-9 flexed, rotated/side bent left (Muscle energy, patient seated)  Rib cage: First rib elevated on the left (Myofascial release, patient supine)  Cervical spine: C7 flexed, rotated/side bent right (Muscle energy with the patient supine).  Head/OA joint: Side bent right/rotated left (Muscle energy with the patient supine).

## 2021-06-21 NOTE — PROGRESS NOTES
Assessment:   Holly Wood (AKKALPANA Serrato) is a 68 y.o. y.o. female with past medical history significant for macular degeneration who presents today for follow-up regarding chronic neck pain thought to be from cervical facet joint syndrome and osteopathic somatic dysfunction.  The neck pain is under good control.  She continues to have chronic left low back pain and left buttock pain thought to be due to myofascial pain.  This pain level remains at its baseline.  The patient continues to have a burning sensation in her knees, mainly at night.  The etiology of this pain is unknown.  She is without any red flag symptoms.  Multiple areas of osteopathic somatic dysfunction as listed below       Plan:     A shared decision making plan was used.  The patient's values and choices were respected.  The following represents what was discussed and decided upon by the physician and the patient.      1.  DIAGNOSTIC TESTS:  No further diagnostic tests are necessary at this time.    2.  PHYSICAL THERAPY: The patient was offered a repeat course of physical therapy, specifically to target the knee pain.  She declines at this time stating that she does not want to start physical therapy before the holiday season.  She will consider physical therapy after the holiday season.  3.  MEDICATIONS: Discussed gabapentin with the patient to try this at bedtime to see if this would help her sleep at night and prevent the burning knee pain from awakening her.  Patient wanted to try gabapentin so gabapentin 300 mg was prescribed.  She can start with 300 mg at bedtime.  If she still awakens at night, she can increase to 2 capsules at bedtime.  If she still awakens at night, she can increase to a maximum of 3 capsules at bedtime.  She should not take more than 3 capsules at bedtime.  -Discussed that gabapentin can cause drowsiness.  She is taking it at bedtime so that should not be a concern.  There are some patients who still experience  drowsiness in the morning when they awaken.  She has side effects to the gabapentin, she is encouraged to contact the clinic.  -Patient also takes amitriptyline.  Discussed with patient that it is safe for her to take amitriptyline with gabapentin.  4.  INTERVENTIONS:  Osteopathic manipulation was performed to 7 areas today.  The patient tolerated the treatment well and did not report any soreness immediately afterwards..   Please see below for the osteopathic manipulation that was performed today.  5.  PATIENT EDUCATION:    - The patient was advised to rest today and drink plenty of water.  Normal activities can be resumed as tolerated tomorrow.    - The patient was told that soreness following the treatment is normal and should improve within a few days.  If the soreness is concerning, the clinic should be notified so further instructions can be given.  -The patient is encouraged to continue being as active as possible.  6.  FOLLOW-UP: The patient is asked to follow-up in 2 weeks for a medication check.  She has any questions, concerns, or any significant worsening of her symptoms prior to that time she is encouraged to contact the clinic..     Subjective:     Holly ISAAC Chiraghima (AKA Ama) is a 68 y.o. female who presents today for follow-up regarding chronic neck pain as well as left-sided low back pain and left buttock pain in addition to burning pain in the bilateral knees.  The patient was last seen in clinic on October 31 of 2018.  The patient reports that the neck pain is doing quite well.  The low back pain and buttock pain continues to burn and is at his baseline.  She continues to have burning pain in her knees.  This is tolerable during the day, but it does awaken her at night.  She will often have to get an ice pack during the middle of the night to put over her knees to help alleviate the pain.  The patient rates her overall pain at a 4 out of 10.  At best it is a 2 out of 10.  At worst it is a 6 out of  10.  Her pain is worse with lying down, exercising, lifting.  She does feel better with ibuprofen, aspirin, ice packs.  She is wondering if there is something that can help her sleep.  She has taken amitriptyline for years intermittently, but is wondering if there is something else that could potentially help her sleep.  Patient does not have any radiation of pain into the arms from her neck.  She denies any numbness tingling or weakness in the arms.  No pain distal to the left buttock.  No new symptoms at this time.    Past medical history is reviewed and is unchanged in the interim.    Family history is reviewed and is unchanged in the interim.    Review of Systems:  Negative for numbness/tingling, weakness, bowel/bladder dysfunction, foot drop, headache, dizziness, nausea/vomiting, blurred vision, balance changes.     Objective:   CONSTITUTIONAL:  Vital signs as above.  No acute distress.  The patient is well nourished and well groomed.    PSYCHIATRIC:  The patient is awake, alert, oriented to person, place and time.  The patient is answering questions appropriately with clear speech.  Normal affect.  SKIN:  Skin over the face, posterior torso, bilateral upper and lower extremities is clean, dry, intact without rashes.  MUSCULOSKELETAL:  Gait is non-antalgic.  The patient is able to transfer independently.  Has significant tenderness over the bilateral lumbar paraspinal muscles.    OSTEOPATHIC STRUCTURAL EXAM:  Positive standing flexion test on the left.  Left superior iliac crest.  Lumbar spine: L2-3 flexed, rotated/side bent left  Sacrum: Right unilateral sacral flexion  Innominate: Left up slipped innominate, posterior/superior left  Thoracic spine: T1-3 flexed, rotated/side bent left  Rib cage: First rib elevated on the left  Cervical spine: C2 flexed, rotated/side bent right  Head/OA joint: Side bent left/rotated right    OMT:  TREATMENTS IN PARENTHESES  Lumbar spine: L2-3 flexed, rotated/side bent left   (Muscle energy, patient in lateral recumbent)  Sacrum: Right unilateral sacral flexion (Myofascial release, patient prone).  Innominate: Left up slipped innominate, (Still technique, patient prone).  Posterior/superior left  (Muscle energy with the patient supine).  Thoracic spine: T1-3 flexed, rotated/side bent left (Myofascial release, patient supine)  Rib cage: First rib elevated on the left  (Muscle energy with the patient supine).  Cervical spine: C2 flexed, rotated/side bent right  (Muscle energy with the patient supine).  Head/OA joint: Side bent left/rotated right  (Muscle energy with the patient supine).

## 2021-06-21 NOTE — PROGRESS NOTES
Assessment:   Holly Wood is a 68 y.o. y.o. female with past medical history significant for macular degeneration who presents today for follow-up regarding chronic left low back/buttock pain.  The patient is also following up from neck pain thought to be from cervical facet syndrome and osteopathic somatic dysfunction.  She reports that her back pain is chronic and it is at its baseline level.  Her neck pain is very mild and is significantly improved from when treatment was started in January 2018.  The patient continues to have multiple areas of osteopathic somatic dysfunction.  She is without any red flag symptoms..         Plan:     A shared decision making plan was used.  The patient's values and choices were respected.  The following represents what was discussed and decided upon by the physician and the patient.      1.  DIAGNOSTIC TESTS:  No further diagnostic tests are necessary at this time.    2.  PHYSICAL THERAPY: No further physical therapy at this time.  The patient has previously completed physical therapy and is encouraged to continue doing the home exercise program daily.   3.  MEDICATIONS:  No changes to the medications today.  She can continue to take ibuprofen 800 mg once a day.  She is encouraged to have her kidneys checked on a regular basis and encouraged to take the ibuprofen with food/water.  4.  INTERVENTIONS:  Osteopathic manipulation was performed to 7 areas today.  The patient tolerated the treatment well and reported some soreness in the neck immediately afterwards.   Please see below for the osteopathic manipulation that was performed today.  5.  PATIENT EDUCATION:    - The patient was advised to rest today and drink plenty of water.  Normal activities can be resumed as tolerated tomorrow.    - The patient was told that soreness following the treatment is normal and should improve within a few days.  If the soreness is concerning, the clinic should be notified so further  instructions can be given.  -Patient would like to return to going to the [facility] to begin working out and using the strengthening machines as well as swimming.  Discussed with the patient that this is fine for her to begin doing this.  -Patient asked if she could resume doing archery.  The patient was told that she can resume doing archery, however if it hurts or she cannot do it (does not have the strength to draw the boat), she needs to stop.  -We discussed that the patient would be a candidate for greater trochanteric bursa injections as well as corticosteroid injections into her knees if she desired.  At this point patient would like to manage her pain with exercises.  She will consider injections only if her pain is intolerable.  6.  FOLLOW-UP: The patient is asked to follow-up in 1-2 weeks.  Dr. Gao will look up the midpole strain counterstrain point of the sacrum to treat her with the next time..     Subjective:     Holly Wood is a 68 y.o. female who presents today for follow-up regarding chronic low back and buttock pain on the left side.  Patient reports that she has had this pain ever since her second pregnancy which was 48 years ago.  She states that it always hurts, but at times it can be so severe that she cannot get her pants on.  She has done physical therapy over the years and this has helped.  Currently her pain is at its baseline, where it is manageable.  She states that she has quite a bit of pain in the bilateral greater trochanters as well as in her knees.  She did go to physical therapy in August and was given exercises to do.  However shortly after her initial evaluation with physical therapy, she went on a 7-week vacation.  She states that she was very active doing hiking, biking, climbing every day.  She reports that for the first 3 weeks in the evening time, she would have significant pain.  However now that she has returned home she feels like she is very strong.  She feels like  she has more flexibility.  She is amazed that she cannot cross her left leg over her right, as she could not do this before the vacation.  At this point she feels that she should put most of her time and energy into strengthening her muscles with weights.  Patient continues to have some left neck pain, but states that this is significantly improved from where it was when she initially began osteopathic manipulation in January 2018.  Patient currently rates her pain at a 4 out of 10.  At best is a 2 out of 10.  At worst it is an 8 out of 10.  Patient is taking ibuprofen 800 mg in the morning.  This does provide significant relief.  Increasing her activities has helped.  Lying in a prone position, lifting, or certain exercises will aggravate the pain.  Her pain is the absolute worse so at night when she goes to lay down.  She denies any new symptoms at this time.      Past medical history is reviewed and is unchanged in the interim.    Family history is reviewed and is unchanged in the interim.    Review of Systems:  Negative for numbness/tingling, weakness, bowel/bladder dysfunction, foot drop, headache, dizziness, nausea/vomiting, blurred vision, balance changes.     Objective:   CONSTITUTIONAL:  Vital signs as above.  No acute distress.  The patient is well nourished and well groomed.    PSYCHIATRIC:  The patient is awake, alert, oriented to person, place and time.  The patient is answering questions appropriately with clear speech.  Normal affect.  SKIN:  Skin over the face, posterior torso, bilateral upper and lower extremities is clean, dry, intact without rashes.  MUSCULOSKELETAL:  Gait is non-antalgic.  The patient is able to transfer independently.  Patient has pinpoint tenderness over the sacrum at approximately the mid pole just to the left of the sacral spine.    OSTEOPATHIC STRUCTURAL EXAM:  On standing examination the patient does appear to have a symmetric iliac crest heights with a positive standing  flexion test on the right, indicating a right inferior iliac crest.  However on supine examination, her iliac crest heights and her issue tuberosity heights are even.  However this was checked after treatment of the lumbar spine.  Lumbar spine: L2-3 flexed, rotated/side bent left  Sacrum: Decreased caudal motion over the sacral base.  Innominate: Anterior/inferior right, posterior/superior left  Thoracic spine: T 5-7 flexed, rotated/side bent right  Rib cage: Decreased motion over ribs 5 through 8 on the left.  First rib elevated on the left.  Cervical spine: C7 flexed, rotated/side bend right, C2 flexed, rotated/side bent left  Head/OA joint: Side bent left/rotated right      OMT:  TREATMENTS IN PARENTHESES  Lumbar spine: L2-3 flexed, rotated/side bent left  Sacrum: Decreased caudal motion over the sacral base.  Innominate: Anterior/inferior right, posterior/superior left  Thoracic spine: T 5-7 flexed, rotated/side bent right  Rib cage: Decreased motion over ribs 5 through 8 on the left.  First rib elevated on the left.  Cervical spine: C7 flexed, rotated/side bend right, C2 flexed, rotated/side bent left  Head/OA joint: Side bent left/rotated right

## 2021-06-21 NOTE — PROGRESS NOTES
Assessment:   Holly Wood (AKKALPANA Serrato) is a 68 y.o. y.o. female with past medical history significant for macular degeneration who presents today for follow-up regarding chronic neck pain thought to be from cervical facet syndrome and osteopathic somatic dysfunction.  She continues to have chronic left low back and buttock pain thought potentially due to myofascial pain and strain counterstrain tender points.  Patient continues to report significant relief following osteopathic manipulation treatments.  She continues to have multiple areas of osteopathic somatic dysfunction.  She is without any red flag symptoms.  She does have a new complaint today of bilateral anterior knee pain, which is likely from tight quadriceps muscle/tendons..         Plan:     A shared decision making plan was used.  The patient's values and choices were respected.  The following represents what was discussed and decided upon by the physician and the patient.      1.  DIAGNOSTIC TESTS:  No further diagnostic tests are necessary at this time.    2.  PHYSICAL THERAPY: No further physical therapy at this time.  The patient has previously completed physical therapy and is encouraged to continue doing the home exercise program daily.    3.  MEDICATIONS:  No changes to the medications today.    4.  INTERVENTIONS:  Osteopathic manipulation was performed to 7 areas today.  The patient tolerated the treatment well and reported relief immediately afterwards..   Please see below for the osteopathic manipulation that was performed today.  5.  PATIENT EDUCATION:    - The patient was advised to rest today and drink plenty of water.  Normal activities can be resumed as tolerated tomorrow.    - The patient was told that soreness following the treatment is normal and should improve within a few days.  If the soreness is concerning, the clinic should be notified so further instructions can be given.  -The patient was told that her anterior knee pain and  distal anterior thigh pain is likely from quadriceps muscle/tendon tightness.  She was shown the quadriceps stretch.  She is encouraged to do this at least once a day.  Her goal should be to hold the stretch for 60 seconds, though initially she may be only able to hold it for a few seconds at a time.  Patient was also told that she can use a hand massage device to rub the area as this may help to loosen the area as well.  6.  FOLLOW-UP: The patient is asked to follow-up in approximately 2-3 weeks.  If she has any questions or concerns in the interim, she is encouraged to contact the clinic.     Subjective:     Holly Wood (AKKALPNAA Serrato) is a 68 y.o. female who presents today for follow-up regarding chronic neck pain thought to be from cervical facet syndrome and osteopathic somatic dysfunction.  Patient is pleased with how she is doing with regarding the neck pain.  Reports it is nothing like it was in January 2018.  She continues to do well in that regard.  She continues to have some soreness in the left low back and left buttock area.  He is interested to see if different types of osteopathic manipulation will help with her current pain in this area.  This is chronic and is unchanged from his usual baseline.  Patient does have a new complaint today of bilateral anterior knee pain that radiates cephalad up into the distal anterior thighs.  She states that this started about 4-5 months ago around the time that she started to do a lot of biking.  She wonders if it is not related to that.  She states that the left side is slightly worse than the right.  Overall she rates her pain at a 3 out of 10.  At best it is a 2 out of 10.  At worst it is an 8 out of 10.  Her pain is worse with exercising, lifting, lying down.  She does feel better with exercise, rest, ibuprofen.    Past medical history is reviewed and is unchanged in the interim.    Family history is reviewed and is unchanged in the interim.    Review of  Systems:  Negative for numbness/tingling, weakness, bowel/bladder dysfunction, foot drop, headache, dizziness, nausea/vomiting, blurred vision, balance changes.     Objective:   CONSTITUTIONAL:  Vital signs as above.  No acute distress.  The patient is well nourished and well groomed.    PSYCHIATRIC:  The patient is awake, alert, oriented to person, place and time.  The patient is answering questions appropriately with clear speech.  Normal affect.  SKIN:  Skin over the face, posterior torso, bilateral upper and lower extremities is clean, dry, intact without rashes.  MUSCULOSKELETAL:  Gait is non-antalgic.  The patient is able to transfer independently.  Denies exquisite tenderness over the bilateral lower lumbar paraspinal muscles and over the sacral area, just lateral to the edge of the sacrum on the left side, at approximately the mid pole of the sacrum.    OSTEOPATHIC STRUCTURAL EXAM:  Positive standing flexion test on the left.  Left superior iliac crest.  Lumbar spine: L2-4 flexed, rotated/side bent left    Sacrum: Right unilateral sacral flexion , the patient does have a left sacral midpole tender point.  Innominate: Left up slipped innominate, posterior/superior left  Thoracic spine: T4-6 flexed, rotated/side bent left  Rib cage: First rib elevated on the left  Cervical spine: C2 flexed, rotated/side bent right  Head/OA joint: Side bent right/rotated left    OMT:  TREATMENTS IN PARENTHESES  Lumbar spine: L2-4 flexed, rotated/side bent left (Muscle energy, patient in lateral recumbent)  Sacrum: Right unilateral sacral flexion (myofascial release with respiratory component, patient prone), the patient does have a left sacral midpole tender point.  (Strain counterstrain with the patient prone, abducting the hip).  Innominate: Left up slipped innominate (Still technique, patient prone)., posterior/superior left  (Muscle energy with the patient supine).  Thoracic spine: T4-6 flexed, rotated/side bent left,  (myofascial release with patient seated)  Rib cage: First rib elevated on the left  (Muscle energy with the patient supine).  Cervical spine: C2 flexed, rotated/side bent right (Myofascial release, patient supine)  Head/OA joint: Side bent right/rotated left (Myofascial release, patient supine)

## 2021-06-22 NOTE — PROGRESS NOTES
Assessment:   Holly Wood (AKA Ama) is a 68 y.o. y.o. female with past medical history significant for macular degeneration who presents today for follow-up regarding chronic neck pain thought to be from cervicalfacet joint syndrome and osteopathic somatic dysfunction.  Her neck pain remains under good control as does her chronic now midline low back pain without radicular/sciatica symptoms and knee pain with taking Gabapentin 300 mg and amitriptyline at bedtime.  She continues to have multiple areas of osteopathic somatic dysfunction as listed below.         Plan:     A shared decision making plan was used.  The patient's values and choices were respected.  The following represents what was discussed and decided upon by the physician and the patient.      1.  DIAGNOSTIC TESTS:  No further diagnostic tests are necessary at this time.    2.  PHYSICAL THERAPY: No further physical therapy at this time.  The patient has previously completed physical therapy and is encouraged to continue doing the home exercise program daily.    3.  MEDICATIONS:  No changes to the medications today.    4.  INTERVENTIONS:  Osteopathic manipulation was performed to 7 areas today.  The patient tolerated the treatment well and she stated she felt better immediately afterwards..   Please see below for the osteopathic manipulation that was performed today.  5.  PATIENT EDUCATION:    - The patient was advised to rest today and drink plenty of water.  Normal activities can be resumed as tolerated tomorrow.    - The patient was told that soreness following the treatment is normal and should improve within a few days.  If the soreness is concerning, the clinic should be notified so further instructions can be given.  6.  FOLLOW-UP: The patient is asked to follow-up in 2 months.  If she has any questions, concerns, or any significant worsening of her pain prior to that time she is encouraged to contact the clinic.    Subjective:     Holly  MARLIN Wood (AKKALPANA Serrato) is a 68 y.o. female who presents today for follow-up regarding chronic neck pain as well as chronic low back pain with bilateral knee pain (no radicular/sciatica type symptoms).  Patient's last treatment was on December 4, 2018.  She reports that she is doing quite well since that time.  The neck really does not bother her.  She states that the back pain is the worst area pain, but she reports that this has not been bad at all.  She reports that he has not even thought about her back pain in the last couple of weeks.  She states that given the holidays, the fact that she has not thought about her back pain in a couple of weeks, is really good.  She is very pleased with how she is doing.  Her back pain continues to be aggravated with lifting, prolonged walking, or if she sits in a bad position.  She does feel better with the manipulation.  She continues to take the gabapentin 300 mg at bedtime as well as amitriptyline.  She reports that she is able to sleep through the night.  She is so pleased with the addition of the gabapentin.  She rates her pain today at a 2 out of 10.  At best it is a 0 out of 10.  At worst it is a 2 out of 10.    Past medical history is reviewed and is unchanged in the interim.    Family history is reviewed and is unchanged in the interim.    Review of Systems:  Negative for numbness/tingling, weakness, bowel/bladder dysfunction, foot drop, headache, dizziness, nausea/vomiting, blurred vision, balance changes.     Objective:   CONSTITUTIONAL:  Vital signs as above.  No acute distress.  The patient is well nourished and well groomed.    PSYCHIATRIC:  The patient is awake, alert, oriented to person, place and time.  The patient is answering questions appropriately with clear speech.  Normal affect.  SKIN:  Skin over the face, posterior torso, bilateral upper and lower extremities is clean, dry, intact without rashes.  MUSCULOSKELETAL:  Gait is non-antalgic.  The patient is  able to transfer independently.  The patient has tenderness over the sacral fascia as well as over the bilateral lower lumbar paraspinal muscles, however there is less tenderness over the bilateral lower lumbar paraspinal muscles compared to tenderness in the past.    OSTEOPATHIC STRUCTURAL EXAM:  Negative standing flexion test.  Symmetric iliac crest.  Lumbar spine: L3-4 flexed, rotated/side bend right  Sacrum: Right unilateral sacral flexion  Innominate: Anterior/inferior right, posterior/superior left  Thoracic spine: T7-9 flexed, rotated/side bend right  Rib cage: First rib elevated on the left  Cervical spine: C3 flexed, rotated/side bent left  Head/OA joint: Side bent right/rotated left    OMT:  TREATMENTS IN PARENTHESES  Lumbar spine: L3-4 flexed, rotated/side bend right (Myofascial release, patient prone).  Sacrum: Right unilateral sacral flexion (Myofascial release, patient prone).  Innominate: Anterior/inferior right, posterior/superior left  (Muscle energy with the patient supine).  Thoracic spine: T7-9 flexed, rotated/side bend right (Muscle energy, patient seated)  Rib cage: First rib elevated on the left (Myofascial release, patient supine)  Cervical spine: C3 flexed, rotated/side bent left  (Muscle energy with the patient supine).  Head/OA joint: Side bent right/rotated left (Myofascial release, patient supine)

## 2021-06-22 NOTE — PATIENT INSTRUCTIONS - HE
Please rest today and drink plenty of water.      It is normal to have soreness following the treatment.  Please use heat or ice over the sore areas.  You may take your usual pain medications.  Please call the clinic at [phone] or contact Dr. Gao via [employer] if the soreness is concerning to you.      Please follow-up in 8 weeks.

## 2021-06-24 NOTE — PATIENT INSTRUCTIONS - HE
Dr. Gao ordered a diagnostic left hip injection today to see if this is what is causing that buttock pain.  You will fill out a pain diary on the day of the injection.  Once Dr. Gao reviews the pain diary, you will be contacted with what the next steps will be.    Please rest today and drink plenty of water.      It is normal to have soreness following the treatment.  Please use heat or ice over the sore areas.  You may take your usual pain medications.  Please call the clinic at [phone] or contact Dr. Gao via [employer] if the soreness is concerning to you.      Please follow-up in 8 weeks for further OMM.

## 2021-06-24 NOTE — PROGRESS NOTES
Assessment:   Holly Wood (AKA Ama)  is a 68 y.o. y.o. female with past medical history significant for macular degeneration who presents today for follow-up regarding chronic midline low back pain without sciatica.  The patient also has significant left buttock pain that she feels has been worsening recently.  Initially this was thought to be the left greater trochanteric bursitis, but today her exam is not consistent with left greater trochanteric bursitis.  It appears that her buttock pain may be from left intra-articular hip joint pain, and her x-ray did show primary osteoarthritis of the left hip (with degenerative changes on the right side 2).  Patient reports that her neck pain has been stable.  She continues to have multiple areas of osteopathic somatic dysfunction as listed below.       Plan:     A shared decision making plan was used.  The patient's values and choices were respected.  The following represents what was discussed and decided upon by the physician and the patient.      1.  DIAGNOSTIC TESTS: The patient's x-rays of the bilateral hips were personally reviewed today by the physician.  There are mild degenerative changes seen of both hips.  - No further diagnostic tests are necessary at this time.    2.  PHYSICAL THERAPY: Patient was offered physical therapy for the left hip pain.  She declines at this time.  3.  MEDICATIONS:  No changes to the medications today.    4.  INTERVENTIONS:    -The patient was offered a left intra-articular hip joint diagnostic only injection (no steroid).  Patient wanted to move forward with this.  The provider who performs the injection is asked to give her a pain diary so that the response to local anesthetic can be monitored.  - Osteopathic manipulation was performed to 7 areas today.  The patient tolerated the treatment well and reported relief immediately afterwards..   Please see below for the osteopathic manipulation that was performed today.  5.   PATIENT EDUCATION:    - The patient was advised to rest today and drink plenty of water.  Normal activities can be resumed as tolerated tomorrow.    - The patient was told that soreness following the treatment is normal and should improve within a few days.  If the soreness is concerning, the clinic should be notified so further instructions can be given.  -Patient is encouraged to continue doing her exercises for the neck upper back.  6.  FOLLOW-UP: The patient is asked to follow-up first available for the diagnostic injection.  Dr. Gao will review her pain diary in the next steps regarding the buttock pain can be provided.  She can follow-up 8 weeks from today for further osteopathic manipulation.  She is welcome to call with any questions or concerns in the interim..     Subjective:     Holly Wood (AKA Ama) is a 68 y.o. female who presents today for follow-up regarding chronic midline low back pain and left buttock pain.  The patient reports that the buttock pain has been more bothersome recently.  It really prevents her from being able to lay on her left side for long periods of time.  The pain will awaken her at night when she does lay on the left side.  Otherwise she reports that her knees have been doing quite well with taking the time.  Her neck has also been doing quite well with doing her physical therapy exercises.  Overall she rates her pain today at 2 out of 10.  At best it is a 2 out of 10.  At worst it is a 6 out of 10.  She does feel that she gets good relief with osteopathic manipulation as well as with doing her home exercise program.  She denies any new symptoms at this time.    Past medical history is reviewed and is unchanged in the interim.    Family history is reviewed and is unchanged in the interim.    Review of Systems:  Negative for numbness/tingling, weakness, bowel/bladder dysfunction, foot drop, headache, dizziness, nausea/vomiting, blurred vision, balance changes.      Objective:   CONSTITUTIONAL:  Vital signs as above.  No acute distress.  The patient is well nourished and well groomed.    PSYCHIATRIC:  The patient is awake, alert, oriented to person, place and time.  The patient is answering questions appropriately with clear speech.  Normal affect.  SKIN:  Skin over the face, posterior torso, bilateral upper and lower extremities is clean, dry, intact without rashes.  MUSCULOSKELETAL:  Gait is non-antalgic.  The patient is able to transfer independently.      OSTEOPATHIC STRUCTURAL EXAM:  Negative standing flexion test.  Symmetric iliac crest.  Lumbar spine: L3-4 flexed, rotated/side bent left  Sacrum: Right unilateral sacral flexion  Innominates/Pelvis: Anterior/inferior right, posterior/superior left  Thoracic spine: T5-7 flexed, rotated/side bent left  Rib cage: Decreased motion over ribs 3 through 7 on the left.  First rib elevated on the left.  Cervical spine: C7 flexed, rotated/side bent right  Head/OA joint: SideBent right/rotated left    OMT:  TREATMENTS IN PARENTHESES  Lumbar spine: L3-4 flexed, rotated/side bent left (Myofascial release, patient prone).  Sacrum: Right unilateral sacral flexion (Myofascial release, patient prone).  Innominates/Pelvis: Anterior/inferior right, posterior/superior left  (Muscle energy with the patient supine).  Thoracic spine: T5-7 flexed, rotated/side bent left (Muscle energy, patient seated)  Rib cage: Decreased motion over ribs 3 through 7 on the left.  (Myofascial release, patient in the lateral recumbent position).  First rib elevated on the left.  (Myofascial release, patient supine)  Cervical spine: C7 flexed, rotated/side bent right  (Muscle energy with the patient supine).  Head/OA joint: SideBent right/rotated left  (Muscle energy with the patient supine).

## 2021-06-24 NOTE — PROGRESS NOTES
[facility] Discharge Summary  Patient Name: Holly Wood  Date: 2/15/2019  Referral Diagnosis: Cervicalgia  Referring provider: Iram Vasquez*  Visit Diagnosis:   1. Cervicalgia     2. Cervical facet syndrome     3. Neck pain         Goals:  No Data Recorded  No Data Recorded    Patient was seen for 2 visits from 5/11/18 to 5/18/18 with 0 missed appointments.  No further therapy is required at this time.   There was only 2 visits ordered for cervical traction.  See below for further information.    Therapy will be discontinued at this time.  The patient will need a new referral to resume.    Thank you for your referral.  Aleksandra Rosado PT  2/15/2019  4:42 PM

## 2021-06-25 NOTE — PROGRESS NOTES
Assessment:   Holly Wood (AKKALPANA Serrato)  is a 71 y.o. y.o. female with past medical history significant for ocular degeneration who presents today for follow-up regarding chronic midline  low back pain thought to be sacroiliac joint pain in etiology as well as right knee pain from primary osteoarthritis of the right knee.  She was last seen on May 4 of 2021 and was treated with osteopathic manipulation at that time.   She also underwent a right intra-articular knee joint injection on May 13 of 2021.  At this time, she is noting seeing significant improvement.  She has a mild amount of low back pain, but otherwise is feeling the best that she has in quite some time.  She continues to have multiple areas of osteopathic somatic dysfunction as listed below.    PSP:  Dr. Gao (transferred from [facility])       Plan:     A shared decision making plan was used.  The patient's values and choices were respected.  The following represents what was discussed and decided upon by the physician and the patient.      1.  DIAGNOSTIC TESTS:  No further diagnostic tests are necessary at this time.    -She had x-rays of her knees in May 2019 and x-rays of her pelvis/hips in 2018.  2.  PHYSICAL THERAPY: No further physical therapy at this time.  She last did physical therapy in the fall 2020 (and had recently declined a repeat order for physical therapy).  The patient has previously completed physical therapy and is encouraged to continue doing the home exercise program daily.    3.  MEDICATIONS:  No changes to the medications today.    -She can continue to take the gabapentin 300 mg at bedtime.  -She can continue to take the Elavil 25 mg at bedtime.  -Can continue with over-the-counter aspirin as needed.  4.  INTERVENTIONS: She is deemed appropriate for continued osteopathic manipulation.  Osteopathic manipulation was performed to 7 areas today.  The patient tolerated the treatment well and reported some relief immediately  afterwards.   Please see below for the osteopathic manipulation that was performed today.  -Her second dose of the Covid vaccine was in mid April.  5.  PATIENT EDUCATION:    - The patient was advised to rest today and drink plenty of water.  Normal activities can be resumed as tolerated tomorrow.    - The patient was told that soreness following the treatment is normal and should improve within a few days.  Ice should be used (as opposed to heat) if soreness occurs.  If the soreness is concerning, the clinic should be notified so further instructions can be given.  6.  FOLLOW-UP: The patient is asked to follow-up in approximately 4 weeks. If there are any questions/concerns or any significant worsening of pain prior to that time, the patient is asked to call the clinic via the nurse navigation line or via [employer].     Subjective:     Holly Wood (AKA Ama)  is a 71 y.o. female who presents today for follow-up regarding chronic bilateral low back pain without radicular/sciatic type leg symptoms in addition to right knee pain.  She is status post osteopathic manipulation on May 4 of 2021.  She is status post a right intra-articular knee joint injection on May 13 of 2021.  Reports that she is feeling overall quite better.  She reports that this is the best that she has felt in quite some time.  She is very pleased with how she is doing.  She has a mild amount of pain in her low back area near the SI joints.  It does not radiate down the legs.  She denies any numbness tingling or weakness in the legs.  She rates her pain today at a 1 out of 10.  At worst is an 8 out of 10.  At best is 1 out of 10.  Pain is worse with lifting and walking.  She does feel better with stretching, cortisone injections, and modifying her activities to alleviate pain.  She continues to take the aspirin, gabapentin, amitriptyline.  She does find these significantly helpful.  She would like to continue with osteopathic manipulation  today.    Past medical history is reviewed and is unchanged in the interim.    Family history is reviewed and is unchanged in the interim.    Review of Systems:.  Pertinent negatives include no numbness, tingling, weakness, headaches, fevers, chills, unexplained weight loss, bowel incontinence, bladder incontinence, trips, stumbles, falls.  All others reviewed and are negative.     Objective:   CONSTITUTIONAL:  Vital signs as above.  No acute distress.  The patient is well nourished and well groomed.    PSYCHIATRIC:  The patient is awake, alert, oriented to person, place and time.  The patient is answering questions appropriately with clear speech.  Normal affect.  SKIN:  Skin over the face, posterior torso, bilateral upper and lower extremities is clean, dry, intact without rashes.  MUSCULOSKELETAL:  Gait is non-antalgic.  The patient is able to transfer independently.      OSTEOPATHIC STRUCTURAL EXAM:  Symmetric iliac crest.  Lumbar spine: L4-5 flex, rotated/side bent left  Sacrum: Right unilateral sacral flexion  Innominates/Pelvis: Anterior/inferior right, posterior/superior left  Thoracic spine: T1 flexed, rotated/side bent right  Rib cage: First rib elevated on the left  Cervical spine: C2 flexed, rotated/side bent right  Head/OA joint: Sidebent right/rotated left    OMT:  TREATMENTS IN PARENTHESES  Lumbar spine: L4-5 flex, rotated/side bent left (Myofascial release, patient prone).  Sacrum: Right unilateral sacral flexion (Myofascial release, patient prone).  Innominates/Pelvis: Anterior/inferior right, posterior/superior left  (Muscle energy with the patient supine).  Thoracic spine: T1 flexed, rotated/side bent right (Myofascial release, patient supine)  Rib cage: First rib elevated on the left (Myofascial release, patient supine)  Cervical spine: C2 flexed, rotated/side bent right  (Muscle energy with the patient supine).  Head/OA joint: Sidebent right/rotated left (Myofascial release, patient supine)

## 2021-06-26 NOTE — PATIENT INSTRUCTIONS - HE
Please rest today and drink plenty of water.      It is normal to have soreness following the treatment.  Please use ice over the sore areas.  You may take your usual pain medications.  Please call the clinic at [phone] or contact Dr. Gao via [employer] if the soreness is concerning to you.      Please follow-up in 4 weeks.

## 2021-07-01 ENCOUNTER — COMMUNICATION - HEALTHEAST (OUTPATIENT)
Dept: TELEHEALTH | Facility: CLINIC | Age: 71
End: 2021-07-01

## 2021-07-01 ENCOUNTER — HOSPITAL ENCOUNTER (OUTPATIENT)
Dept: PHYSICAL MEDICINE AND REHAB | Facility: CLINIC | Age: 71
Discharge: HOME | End: 2021-07-01
Attending: PHYSICAL MEDICINE & REHABILITATION
Payer: COMMERCIAL

## 2021-07-01 DIAGNOSIS — G89.29 CHRONIC PAIN OF RIGHT KNEE: ICD-10-CM

## 2021-07-01 DIAGNOSIS — M25.561 CHRONIC PAIN OF RIGHT KNEE: ICD-10-CM

## 2021-07-01 DIAGNOSIS — M54.50 CHRONIC MIDLINE LOW BACK PAIN WITHOUT SCIATICA: ICD-10-CM

## 2021-07-01 DIAGNOSIS — M99.04 SACRAL REGION SOMATIC DYSFUNCTION: ICD-10-CM

## 2021-07-01 DIAGNOSIS — M17.11 PRIMARY OSTEOARTHRITIS OF RIGHT KNEE: ICD-10-CM

## 2021-07-01 DIAGNOSIS — G89.29 CHRONIC MIDLINE LOW BACK PAIN WITHOUT SCIATICA: ICD-10-CM

## 2021-07-01 DIAGNOSIS — M99.02 THORACIC REGION SOMATIC DYSFUNCTION: ICD-10-CM

## 2021-07-01 DIAGNOSIS — M99.01 CERVICAL SOMATIC DYSFUNCTION: ICD-10-CM

## 2021-07-01 DIAGNOSIS — M99.03 LUMBAR REGION SOMATIC DYSFUNCTION: ICD-10-CM

## 2021-07-01 DIAGNOSIS — M99.08 RIB CAGE REGION SOMATIC DYSFUNCTION: ICD-10-CM

## 2021-07-01 DIAGNOSIS — M99.05 SOMATIC DYSFUNCTION OF PELVIS REGION: ICD-10-CM

## 2021-07-01 DIAGNOSIS — M99.00 HEAD REGION SOMATIC DYSFUNCTION: ICD-10-CM

## 2021-07-01 ASSESSMENT — MIFFLIN-ST. JEOR: SCORE: 1394.93

## 2021-07-03 NOTE — ADDENDUM NOTE
Addendum Note by Sylwia Dove CMA at 12/5/2019  8:40 AM     Author: Sylwia Dove CMA Service: -- Author Type: Certified Medical Assistant    Filed: 12/5/2019  9:20 AM Date of Service: 12/5/2019  8:40 AM Status: Signed    : Sylwia Dove CMA (Certified Medical Assistant)    Encounter addended by: Sylwia Dove CMA on: 12/5/2019  9:20 AM      Actions taken: Vitals modified

## 2021-07-03 NOTE — ADDENDUM NOTE
Addendum Note by Sylvia Vasquez DO at 7/16/2020  1:20 PM     Author: Sylvia Vasquez DO Service: -- Author Type: Physician    Filed: 7/16/2020  2:45 PM Date of Service: 7/16/2020  1:20 PM Status: Signed    : Sylvia Vasquez DO (Physician)    Encounter addended by: Sylvia Vasquez DO on: 7/16/2020  2:45   PM      Actions taken: Clinical Note Signed

## 2021-07-04 NOTE — PATIENT INSTRUCTIONS - HE
Patient Instructions by Sylvia Vasquez DO at 7/1/2021 12:40 PM     Author: Sylvia Vasquez DO Service: -- Author Type: Physician    Filed: 7/1/2021  1:10 PM Date of Service: 7/1/2021 12:40 PM Status: Signed    : Sylvia Vasquez DO (Physician)       Please rest today and drink plenty of water.      It is normal to have soreness following the treatment.  Please use ice over the sore areas.  You may take your usual pain medications.  Please call the clinic at [phone] or contact Dr. Gao via [employer] if the soreness is concerning to you.      Please follow-up in 4 weeks.

## 2021-07-06 VITALS — WEIGHT: 213 LBS | BODY MASS INDEX: 40.22 KG/M2 | HEIGHT: 61 IN

## 2021-07-06 VITALS — WEIGHT: 210 LBS | BODY MASS INDEX: 39.65 KG/M2 | HEIGHT: 61 IN

## 2021-07-13 ENCOUNTER — RECORDS - HEALTHEAST (OUTPATIENT)
Dept: ADMINISTRATIVE | Facility: CLINIC | Age: 71
End: 2021-07-13

## 2021-07-20 ENCOUNTER — LAB REQUISITION (OUTPATIENT)
Dept: LAB | Facility: CLINIC | Age: 71
End: 2021-07-20

## 2021-07-20 DIAGNOSIS — R11.0 NAUSEA: ICD-10-CM

## 2021-07-20 LAB
ALBUMIN SERPL-MCNC: 4 G/DL (ref 3.5–5)
ALP SERPL-CCNC: 105 U/L (ref 45–120)
ALT SERPL W P-5'-P-CCNC: 24 U/L (ref 0–45)
ANION GAP SERPL CALCULATED.3IONS-SCNC: 9 MMOL/L (ref 5–18)
AST SERPL W P-5'-P-CCNC: 24 U/L (ref 0–40)
BILIRUB SERPL-MCNC: 0.4 MG/DL (ref 0–1)
BUN SERPL-MCNC: 11 MG/DL (ref 8–28)
CALCIUM SERPL-MCNC: 10.2 MG/DL (ref 8.5–10.5)
CHLORIDE BLD-SCNC: 105 MMOL/L (ref 98–107)
CO2 SERPL-SCNC: 25 MMOL/L (ref 22–31)
CREAT SERPL-MCNC: 0.64 MG/DL (ref 0.6–1.1)
GFR SERPL CREATININE-BSD FRML MDRD: 89 ML/MIN/1.73M2
GLUCOSE BLD-MCNC: 107 MG/DL (ref 70–125)
POTASSIUM BLD-SCNC: 4.1 MMOL/L (ref 3.5–5)
PROT SERPL-MCNC: 6.9 G/DL (ref 6–8)
SODIUM SERPL-SCNC: 139 MMOL/L (ref 136–145)

## 2021-07-20 PROCEDURE — 80053 COMPREHEN METABOLIC PANEL: CPT | Performed by: FAMILY MEDICINE

## 2021-07-21 ENCOUNTER — RECORDS - HEALTHEAST (OUTPATIENT)
Dept: ADMINISTRATIVE | Facility: CLINIC | Age: 71
End: 2021-07-21

## 2021-07-30 ENCOUNTER — ANCILLARY PROCEDURE (OUTPATIENT)
Dept: MAMMOGRAPHY | Facility: CLINIC | Age: 71
End: 2021-07-30
Attending: NURSE PRACTITIONER
Payer: COMMERCIAL

## 2021-07-30 DIAGNOSIS — Z12.31 VISIT FOR SCREENING MAMMOGRAM: ICD-10-CM

## 2021-07-30 PROCEDURE — 77067 SCR MAMMO BI INCL CAD: CPT

## 2021-08-10 ENCOUNTER — OFFICE VISIT (OUTPATIENT)
Dept: PHYSICAL MEDICINE AND REHAB | Facility: CLINIC | Age: 71
End: 2021-08-10
Payer: COMMERCIAL

## 2021-08-10 VITALS — HEART RATE: 86 BPM | DIASTOLIC BLOOD PRESSURE: 98 MMHG | SYSTOLIC BLOOD PRESSURE: 199 MMHG | TEMPERATURE: 99 F

## 2021-08-10 DIAGNOSIS — M99.08 RIB CAGE REGION SOMATIC DYSFUNCTION: ICD-10-CM

## 2021-08-10 DIAGNOSIS — M99.02 THORACIC REGION SOMATIC DYSFUNCTION: ICD-10-CM

## 2021-08-10 DIAGNOSIS — M99.05 SOMATIC DYSFUNCTION OF PELVIS REGION: ICD-10-CM

## 2021-08-10 DIAGNOSIS — G89.29 CHRONIC MIDLINE LOW BACK PAIN WITHOUT SCIATICA: Primary | ICD-10-CM

## 2021-08-10 DIAGNOSIS — M99.04 SACRAL REGION SOMATIC DYSFUNCTION: ICD-10-CM

## 2021-08-10 DIAGNOSIS — M99.01 CERVICAL SOMATIC DYSFUNCTION: ICD-10-CM

## 2021-08-10 DIAGNOSIS — M54.50 CHRONIC MIDLINE LOW BACK PAIN WITHOUT SCIATICA: Primary | ICD-10-CM

## 2021-08-10 DIAGNOSIS — M99.03 LUMBAR REGION SOMATIC DYSFUNCTION: ICD-10-CM

## 2021-08-10 DIAGNOSIS — M99.00 HEAD REGION SOMATIC DYSFUNCTION: ICD-10-CM

## 2021-08-10 PROCEDURE — 99213 OFFICE O/P EST LOW 20 MIN: CPT | Mod: 25 | Performed by: PHYSICAL MEDICINE & REHABILITATION

## 2021-08-10 PROCEDURE — 98928 OSTEOPATH MANJ 7-8 REGIONS: CPT | Performed by: PHYSICAL MEDICINE & REHABILITATION

## 2021-08-10 ASSESSMENT — PAIN SCALES - GENERAL: PAINLEVEL: MODERATE PAIN (4)

## 2021-08-10 NOTE — PROGRESS NOTES
Assessment:   Holly Wood (AKA Ama)  is a 71 year old y.o. female with past medical history significant for macular degeneration who presents today for follow-up regarding chronic midline low back pain secondary to sacroiliac joint pain.  She was last seen on July 1 of 2021.  She was treated with osteopathic manipulation and she has noted significant relief, with pain just returning in the last week or so.  She thinks it was due to some increased lifting that she did in preparation for a party that she had.  She continues to have multiple areas of osteopathic somatic dysfunction as listed below.  She remains without any red flag symptoms..      PSP:  Dr. Gao (transfer of care from [facility])       Plan:     A shared decision making plan was used.  The patient's values and choices were respected.  The following represents what was discussed and decided upon by the physician and the patient.      1.  DIAGNOSTIC TESTS:  No further diagnostic tests are necessary at this time.  Her increased pain is likely from a lumbar sprain/strain, which is a soft tissue problem.  She does not have any radicular or red flag symptoms that would necessitate MRI imaging at this time.  We will monitor her symptoms closely.  If she does have any worsening of symptoms or does not improve as expected, an MRI can be ordered.  2.  PHYSICAL THERAPY: No further physical therapy at this time.  The patient has previously completed physical therapy in the fall 2020 and is encouraged to continue doing the home exercise program daily.    3.  MEDICATIONS:  No changes to the medications today.   -She can continue with gabapentin 300 mg at bedtime.  -She can continue to take Elavil 25 mg at bedtime.  4.  INTERVENTIONS: She is deemed appropriate for continued osteopathic manipulation.  Osteopathic manipulation was performed to 7 areas today.  The patient tolerated the treatment well and she reported relief immediately afterwards.   Please see  below for the osteopathic manipulation that was performed today.  -Her second dose of the Covid vaccine was in mid April.  5.  PATIENT EDUCATION:    - The patient was advised to rest today and drink plenty of water.  Normal activities can be resumed as tolerated tomorrow.    - The patient was told that soreness following the treatment is normal and should improve within a few days.  Ice should be used (as opposed to heat) if soreness occurs.  If the soreness is concerning, the clinic should be notified so further instructions can be given.  6.  FOLLOW-UP: The patient is asked to follow-up approximately 4 weeks. If there are any questions/concerns or any significant worsening of pain prior to that time, the patient is asked to call the clinic via the nurse navigation line or via [email or web address].     Subjective:     Holly Wood (AKA Ama)  is a 71 year old female who presents today for follow-up regarding bilateral low back pain without radicular/sciatic type leg symptoms.  Patient reports that her pain was under good control since her last treatment.  Reports that about a week ago she was prepping for a party.  She did quite a lot of lifting.  She has some mild soreness in her back.  Reports that nothing that is excruciating, just some increased soreness that she thinks is from overdoing it.  The pain is just in the center of her low back.  It does not radiate down the legs.  She denies any numbness tingling or weakness in the legs.  She rates her pain today at a 4 out of 10.  At worst is an 8 out of 10.  At best is a 1 out of 10.  Her pain is worse with lifting and increased activity.  She does feel better with stretching and with taking aspirin and gabapentin.  Osteopathic manipulation always makes her feel better too.  She denies any new symptoms at this time.  She continues to do her home exercise program from physical therapy.  No changes to her medications.  She is continuing to take the gabapentin mainly at  bedtime in addition to aspirin as needed.    Past medical history is reviewed and is unchanged in the interim for any new medical diagnoses.  She does state that she had an episode of vertigo about a week ago, but she attributes this to her viral illness that has since resolved.    Family history is reviewed and is unchanged in the interim.    Review of Systems:.  Pertinent negatives include no numbness, tingling, weakness, headaches, fevers, chills, unexplained weight loss, bowel incontinence, bladder incontinence, trips, stumbles, falls.  All others reviewed and are negative.     Objective:   CONSTITUTIONAL:  Vital signs as above.  No acute distress.  The patient is well nourished and well groomed.    PSYCHIATRIC:  The patient is awake, alert, oriented to person, place and time.  The patient is answering questions appropriately with clear speech.  Normal affect.  SKIN:  Skin over the face, posterior torso, bilateral upper and lower extremities is clean, dry, intact without rashes.  MUSCULOSKELETAL:  Gait is non-antalgic.  The patient is able to transfer independently.      OSTEOPATHIC STRUCTURAL EXAM:  Negative standing flexion test.  Symmetric iliac crest.  Lumbar spine: L4-5 flex, rotated/side bent left  Sacrum: Right unilateral sacral flexion  Innominates/Pelvis: Anterior/inferior right, posterior/superior left  Thoracic spine: T1 flexed, rotated/side bent right  Rib cage: First rib elevated on the left  Cervical spine: C2-3 flexed, rotated/side bent right  Head/OA joint: Side bent right/rotated left    OMT:  TREATMENTS IN PARENTHESES  Lumbar spine: L4-5 flex, rotated/side bent left  (Muscle energy, patient in lateral recumbent)  Sacrum: Right unilateral sacral flexion (Myofascial release, patient prone).  Innominates/Pelvis: Anterior/inferior right, posterior/superior left  (Muscle energy with the patient supine).  Thoracic spine: T1 flexed, rotated/side bent right (Myofascial release, patient supine)   Rib  cage: First rib elevated on the left (Myofascial release, patient supine)  Cervical spine: C2-3 flexed, rotated/side bent right (Myofascial release, patient supine)  Head/OA joint: Side bent right/rotated left (Myofascial release, patient supine)

## 2021-08-10 NOTE — LETTER
8/10/2021         RE: Holly Wood  [address] [facility] [address]        Dear Colleague,    Thank you for referring your patient, Holly Wood, to the [facility] [place]. Please see a copy of my visit note below.    Assessment:   Holly Wood (AKKALPANA Serrato)  is a 71 year old y.o. female with past medical history significant for macular degeneration who presents today for follow-up regarding chronic midline low back pain secondary to sacroiliac joint pain.  She was last seen on July 1 of 2021.  She was treated with osteopathic manipulation and she has noted significant relief, with pain just returning in the last week or so.  She thinks it was due to some increased lifting that she did in preparation for a party that she had.  She continues to have multiple areas of osteopathic somatic dysfunction as listed below.  She remains without any red flag symptoms..      PSP:  Dr. Gao (transfer of care from [facility])       Plan:     A shared decision making plan was used.  The patient's values and choices were respected.  The following represents what was discussed and decided upon by the physician and the patient.      1.  DIAGNOSTIC TESTS:  No further diagnostic tests are necessary at this time.  Her increased pain is likely from a lumbar sprain/strain, which is a soft tissue problem.  She does not have any radicular or red flag symptoms that would necessitate MRI imaging at this time.  We will monitor her symptoms closely.  If she does have any worsening of symptoms or does not improve as expected, an MRI can be ordered.  2.  PHYSICAL THERAPY: No further physical therapy at this time.  The patient has previously completed physical therapy in the fall 2020 and is encouraged to continue doing the home exercise program daily.    3.  MEDICATIONS:  No changes to the medications today.   -She can continue with gabapentin 300 mg at bedtime.  -She can continue to take Elavil  25 mg at bedtime.  4.  INTERVENTIONS: She is deemed appropriate for continued osteopathic manipulation.  Osteopathic manipulation was performed to 7 areas today.  The patient tolerated the treatment well and she reported relief immediately afterwards.   Please see below for the osteopathic manipulation that was performed today.  -Her second dose of the Covid vaccine was in mid April.  5.  PATIENT EDUCATION:    - The patient was advised to rest today and drink plenty of water.  Normal activities can be resumed as tolerated tomorrow.    - The patient was told that soreness following the treatment is normal and should improve within a few days.  Ice should be used (as opposed to heat) if soreness occurs.  If the soreness is concerning, the clinic should be notified so further instructions can be given.  6.  FOLLOW-UP: The patient is asked to follow-up approximately 4 weeks. If there are any questions/concerns or any significant worsening of pain prior to that time, the patient is asked to call the clinic via the nurse navigation line or via [employer].     Subjective:     Holly Ramirezhima (AKA Ama)  is a 71 year old female who presents today for follow-up regarding bilateral low back pain without radicular/sciatic type leg symptoms.  Patient reports that her pain was under good control since her last treatment.  Reports that about a week ago she was prepping for a party.  She did quite a lot of lifting.  She has some mild soreness in her back.  Reports that nothing that is excruciating, just some increased soreness that she thinks is from overdoing it.  The pain is just in the center of her low back.  It does not radiate down the legs.  She denies any numbness tingling or weakness in the legs.  She rates her pain today at a 4 out of 10.  At worst is an 8 out of 10.  At best is a 1 out of 10.  Her pain is worse with lifting and increased activity.  She does feel better with stretching and with taking aspirin and  gabapentin.  Osteopathic manipulation always makes her feel better too.  She denies any new symptoms at this time.  She continues to do her home exercise program from physical therapy.  No changes to her medications.  She is continuing to take the gabapentin mainly at bedtime in addition to aspirin as needed.    Past medical history is reviewed and is unchanged in the interim for any new medical diagnoses.  She does state that she had an episode of vertigo about a week ago, but she attributes this to her viral illness that has since resolved.    Family history is reviewed and is unchanged in the interim.    Review of Systems:.  Pertinent negatives include no numbness, tingling, weakness, headaches, fevers, chills, unexplained weight loss, bowel incontinence, bladder incontinence, trips, stumbles, falls.  All others reviewed and are negative.     Objective:   CONSTITUTIONAL:  Vital signs as above.  No acute distress.  The patient is well nourished and well groomed.    PSYCHIATRIC:  The patient is awake, alert, oriented to person, place and time.  The patient is answering questions appropriately with clear speech.  Normal affect.  SKIN:  Skin over the face, posterior torso, bilateral upper and lower extremities is clean, dry, intact without rashes.  MUSCULOSKELETAL:  Gait is non-antalgic.  The patient is able to transfer independently.      OSTEOPATHIC STRUCTURAL EXAM:  Negative standing flexion test.  Symmetric iliac crest.  Lumbar spine: L4-5 flex, rotated/side bent left  Sacrum: Right unilateral sacral flexion  Innominates/Pelvis: Anterior/inferior right, posterior/superior left  Thoracic spine: T1 flexed, rotated/side bent right  Rib cage: First rib elevated on the left  Cervical spine: C2-3 flexed, rotated/side bent right  Head/OA joint: Side bent right/rotated left    OMT:  TREATMENTS IN PARENTHESES  Lumbar spine: L4-5 flex, rotated/side bent left  (Muscle energy, patient in lateral recumbent)  Sacrum: Right  unilateral sacral flexion (Myofascial release, patient prone).  Innominates/Pelvis: Anterior/inferior right, posterior/superior left  (Muscle energy with the patient supine).  Thoracic spine: T1 flexed, rotated/side bent right (Myofascial release, patient supine)   Rib cage: First rib elevated on the left (Myofascial release, patient supine)  Cervical spine: C2-3 flexed, rotated/side bent right (Myofascial release, patient supine)  Head/OA joint: Side bent right/rotated left (Myofascial release, patient supine)        Again, thank you for allowing me to participate in the care of your patient.        Sincerely,        Sylvia Young, DO

## 2021-08-10 NOTE — PATIENT INSTRUCTIONS
Ama,    Good to see you!  I hope you can get back into [employer]!  Sounds so fun!  Thanks for the tips on where to go for [employer]!    Please rest today and drink plenty of water.      It is normal to have soreness following the treatment.  Please use ice over the sore areas.  You may take your usual pain medications.  Please call the clinic at [phone] or contact Dr. Gao via [employer] if the soreness is concerning to you.      Please follow-up in 4 weeks.    Thanks, Ama!  Dr. Gao

## 2021-09-04 DIAGNOSIS — M25.562 PAIN IN BOTH KNEES, UNSPECIFIED CHRONICITY: ICD-10-CM

## 2021-09-04 DIAGNOSIS — M25.561 PAIN IN BOTH KNEES, UNSPECIFIED CHRONICITY: ICD-10-CM

## 2021-09-04 DIAGNOSIS — M79.18 LEFT BUTTOCK PAIN: ICD-10-CM

## 2021-09-07 RX ORDER — GABAPENTIN 300 MG/1
CAPSULE ORAL
Qty: 90 CAPSULE | Refills: 0 | Status: SHIPPED | OUTPATIENT
Start: 2021-09-07 | End: 2021-09-10

## 2021-09-07 NOTE — TELEPHONE ENCOUNTER
Last appointment: 08/10/2021  Next appointment: 09/21/2021    Notes/Comments:      Rx request(s) has been reviewed.

## 2021-09-10 DIAGNOSIS — M25.561 PAIN IN BOTH KNEES, UNSPECIFIED CHRONICITY: ICD-10-CM

## 2021-09-10 DIAGNOSIS — M25.562 PAIN IN BOTH KNEES, UNSPECIFIED CHRONICITY: ICD-10-CM

## 2021-09-10 DIAGNOSIS — M79.18 LEFT BUTTOCK PAIN: ICD-10-CM

## 2021-09-10 RX ORDER — GABAPENTIN 300 MG/1
CAPSULE ORAL
Qty: 270 CAPSULE | Refills: 1 | Status: SHIPPED | OUTPATIENT
Start: 2021-09-10 | End: 2022-09-26

## 2021-09-15 ENCOUNTER — LAB REQUISITION (OUTPATIENT)
Dept: LAB | Facility: CLINIC | Age: 71
End: 2021-09-15

## 2021-09-15 DIAGNOSIS — E55.9 VITAMIN D DEFICIENCY, UNSPECIFIED: ICD-10-CM

## 2021-09-15 PROCEDURE — 82306 VITAMIN D 25 HYDROXY: CPT | Performed by: FAMILY MEDICINE

## 2021-09-16 LAB — DEPRECATED CALCIDIOL+CALCIFEROL SERPL-MC: 38 UG/L (ref 30–80)

## 2021-09-21 ENCOUNTER — OFFICE VISIT (OUTPATIENT)
Dept: PHYSICAL MEDICINE AND REHAB | Facility: CLINIC | Age: 71
End: 2021-09-21
Payer: COMMERCIAL

## 2021-09-21 VITALS — TEMPERATURE: 98.3 F | HEART RATE: 94 BPM | SYSTOLIC BLOOD PRESSURE: 188 MMHG | DIASTOLIC BLOOD PRESSURE: 91 MMHG

## 2021-09-21 DIAGNOSIS — M54.50 CHRONIC MIDLINE LOW BACK PAIN WITHOUT SCIATICA: Primary | ICD-10-CM

## 2021-09-21 DIAGNOSIS — M99.01 CERVICAL SOMATIC DYSFUNCTION: ICD-10-CM

## 2021-09-21 DIAGNOSIS — M99.05 SOMATIC DYSFUNCTION OF PELVIS REGION: ICD-10-CM

## 2021-09-21 DIAGNOSIS — M99.08 RIB CAGE REGION SOMATIC DYSFUNCTION: ICD-10-CM

## 2021-09-21 DIAGNOSIS — M99.04 SACRAL REGION SOMATIC DYSFUNCTION: ICD-10-CM

## 2021-09-21 DIAGNOSIS — M99.03 LUMBAR REGION SOMATIC DYSFUNCTION: ICD-10-CM

## 2021-09-21 DIAGNOSIS — G89.29 CHRONIC MIDLINE LOW BACK PAIN WITHOUT SCIATICA: Primary | ICD-10-CM

## 2021-09-21 DIAGNOSIS — M99.00 HEAD REGION SOMATIC DYSFUNCTION: ICD-10-CM

## 2021-09-21 DIAGNOSIS — M99.02 THORACIC REGION SOMATIC DYSFUNCTION: ICD-10-CM

## 2021-09-21 PROCEDURE — 99213 OFFICE O/P EST LOW 20 MIN: CPT | Mod: 25 | Performed by: PHYSICAL MEDICINE & REHABILITATION

## 2021-09-21 PROCEDURE — 98928 OSTEOPATH MANJ 7-8 REGIONS: CPT | Performed by: PHYSICAL MEDICINE & REHABILITATION

## 2021-09-21 ASSESSMENT — PAIN SCALES - GENERAL: PAINLEVEL: MODERATE PAIN (4)

## 2021-09-21 NOTE — PROGRESS NOTES
Assessment:   Holly Wood (AKA Ama) is a 71 year old y.o. female with past medical history significant for macular degeneration who presents today for follow-up regarding chronic mildline low back pain secondary to sacroiliac joint pain.  She was last seen on 8-10-21 and treated with osteopathic manipulation.  She has noticed significant relief, but continues to have some soreness, which she attributes to a bike ride.  She continues to have multiple areas of osteopathic somatic dysfunction as listed below.  She remains without any red flag symptoms.      PSP:  Dr. Gao (Transfer of care from [facility])       Plan:     A shared decision making plan was used.  The patient's values and choices were respected.  The following represents what was discussed and decided upon by the physician and the patient.      1.  DIAGNOSTIC TESTS:  No further diagnostic tests are necessary at this time.  She does not have any radicular or red flag symptoms that would necessitate any MRI imaging.    2.  PHYSICAL THERAPY: No further physical therapy at this time.  The patient has previously completed physical therapy in the fall of 2020 and is encouraged to continue doing the home exercise program daily.    3.  MEDICATIONS:  No changes to the medications today.    - She can continue with Gabapentin 300 mg po q HS.  - She can continue to take Elavil 25 mg po q HS.  4.  INTERVENTIONS:  She is deemed appropriate for continued osteopathic manipulation.  Osteopathic manipulation was performed to 7 areas today.  The patient tolerated the treatment well and she reported relief immediately afterwards.   Please see below for the osteopathic manipulation that was performed today.  5.  PATIENT EDUCATION:    - The patient was advised to rest today and drink plenty of water.  Normal activities can be resumed as tolerated tomorrow.    - The patient was told that soreness following the treatment is normal and should improve within a few days.   Ice should be used (as opposed to heat) if soreness occurs.  If the soreness is concerning, the clinic should be notified so further instructions can be given.  6.  FOLLOW-UP: The patient is asked to follow-up in 4 weeks. If there are any questions/concerns or any significant worsening of pain prior to that time, the patient is asked to call the clinic via the nurse navigation line or via [employer].     Subjective:     Holly Wood (AKA Ama)  is a 71 year old female who presents today for follow-up regarding chronic bilateral low back pain without radicular/sciatic type leg symptoms.  She does report that she got good relief with her last osteopathic manipulation treatment on August 10 of 2021.  She has had some increased soreness in her low back recently, going across her low back (typically it has been more on the left side), which she attributes to a possible bike ride.  She says that at times it can be a stabbing or sharp pain, but in general it is more of a sore or muscle type pain.  She denies any radiation of pain going down into the legs.  No numbness tingling or weakness in the legs.  She is rating her pain today at a 4 out of 10.  At worst it is a 4 out of 10.  At best is a 1 out of 10.  Her pain is worse with sudden movement and with lifting.  She does feel better with osteopathic manipulation, doing her physical therapy exercises, and taking medication.  Other than the sharp pains with sudden movements, she denies any new symptoms.  She continues to take aspirin as needed.  She is taking gabapentin and amitriptyline at bedtime which she finds are helpful.    Past medical history is reviewed and is unchanged in the interim.    Family history is reviewed and is unchanged in the interim.    Review of Systems: Pertinent negatives include no numbness, tingling, weakness, headaches, fevers, chills, unexplained weight loss, bowel incontinence, bladder incontinence, trips, stumbles, falls.  All others  reviewed and are negative.     Objective:   CONSTITUTIONAL:  Vital signs as above.  No acute distress.  The patient is well nourished and well groomed.    PSYCHIATRIC:  The patient is awake, alert, oriented to person, place and time.  The patient is answering questions appropriately with clear speech.  Normal affect.  SKIN:  Skin over the face, posterior torso, bilateral upper and lower extremities is clean, dry, intact without rashes.  MUSCULOSKELETAL:  Gait is non-antalgic.  The patient is able to transfer independently.      OSTEOPATHIC STRUCTURAL EXAM:  Negative standing flexion test.  Symmetric iliac crest.  Lumbar spine: L3-5 flex, rotated/side bent left  Sacrum: Right unilateral sacral flexion  Innominates/Pelvis: Anterior/inferior right, posterior/superior left  Thoracic spine: T10-12 flexed, rotated/side bent right  Rib cage: First rib elevated on the left  Cervical spine: C2 flexed, rotated/side bent right  Head/OA joint: Side bent right/rotated left    OMT:  TREATMENTS IN PARENTHESES  Lumbar spine: L3-5 flex, rotated/side bent left  (Muscle energy, patient in lateral recumbent)  Sacrum: Right unilateral sacral flexion (Myofascial release, patient prone).  Innominates/Pelvis: Anterior/inferior right, posterior/superior left  (Muscle energy with the patient supine).  Thoracic spine: T10-12 flexed, rotated/side bent right (Myofascial release, patient prone).  Rib cage: First rib elevated on the left (Myofascial release, patient prone).  Cervical spine: C2 flexed, rotated/side bent right  (Muscle energy with the patient supine).  Head/OA joint: Side bent right/rotated left (Myofascial release, patient prone).

## 2021-09-21 NOTE — LETTER
9/21/2021         RE: Holly Wood  [address] [facility] [place] [address]        Dear Colleague,    Thank you for referring your patient, Holly Wood, to the [facility] [place]. Please see a copy of my visit note below.    Assessment:   Holly Wood (AKA Ama) is a 71 year old y.o. female with past medical history significant for macular degeneration who presents today for follow-up regarding chronic mildline low back pain secondary to sacroiliac joint pain.  She was last seen on 8-10-21 and treated with osteopathic manipulation.  She has noticed significant relief, but continues to have some soreness, which she attributes to a bike ride.  She continues to have multiple areas of osteopathic somatic dysfunction as listed below.  She remains without any red flag symptoms.      PSP:  Dr. Gao (Transfer of care from [facility])       Plan:     A shared decision making plan was used.  The patient's values and choices were respected.  The following represents what was discussed and decided upon by the physician and the patient.      1.  DIAGNOSTIC TESTS:  No further diagnostic tests are necessary at this time.  She does not have any radicular or red flag symptoms that would necessitate any MRI imaging.    2.  PHYSICAL THERAPY: No further physical therapy at this time.  The patient has previously completed physical therapy in the fall of 2020 and is encouraged to continue doing the home exercise program daily.    3.  MEDICATIONS:  No changes to the medications today.    - She can continue with Gabapentin 300 mg po q HS.  - She can continue to take Elavil 25 mg po q HS.  4.  INTERVENTIONS:  She is deemed appropriate for continued osteopathic manipulation.  Osteopathic manipulation was performed to 7 areas today.  The patient tolerated the treatment well and she reported relief immediately afterwards.   Please see below for the osteopathic manipulation that was performed  Diet tips to help make you successful postoperatively    Eating habits after surgery will need to be a long-term change. Eating habits are so ingrained that it can be difficult to change so having made these changes for surgery is a great accomplishment. It is important to maintain these new eating habits after surgery. Also, remember that overall health, age, and genetics make each person's weight loss progress different. Do not compare your progress, the amount you eat, or exercise to other patients.  Protein first at every meal- Eat the protein portion of your meal first. Eating protein helps the body feel full and sends a signal to stop eating. Protein is very important in building tissue in the body.  Eat at least 4 times per day- This includes protein supplements and small meals with a high amount of protein   Chewing your food thoroughly- Eating too quickly and improper chewing can cause pain and vomiting after surgery.  Slowing down the speed at which you eat- Refill your fork only after you swallow. Adopt a new pattern of eating by taking a bite of food and putting your utensil down between bites. This will help to reduce the feeling of food being stuck.    Drink water and other fluids slowly- Drink at least 48 ounces per day minimum. Sip fluids as if they were hot beverages. If you find it difficult to stop gulping liquids, try using a sippy cup or a sport top water bottle.  Make sure you are eating meals without drinking fluids- After surgery you will not be allowed to drink fluids 30 minutes before, during, or 30 minutes after your meal (30/30/30 rule). This will be a life-long behavior change. The reason for the rule is to keep food from passing through your smaller stomach more rapidly.  This will cause you to feel hungry again shortly after your meal.   Continue to avoid caffeine and carbonated beverages- Caffeine acts as a diuretic and can be dehydrating as well as irritating to today.  5.  PATIENT EDUCATION:    - The patient was advised to rest today and drink plenty of water.  Normal activities can be resumed as tolerated tomorrow.    - The patient was told that soreness following the treatment is normal and should improve within a few days.  Ice should be used (as opposed to heat) if soreness occurs.  If the soreness is concerning, the clinic should be notified so further instructions can be given.  6.  FOLLOW-UP: The patient is asked to follow-up in 4 weeks. If there are any questions/concerns or any significant worsening of pain prior to that time, the patient is asked to call the clinic via the nurse navigation line or via [employer].     Subjective:     Holly Wood (AKA Ama)  is a 71 year old female who presents today for follow-up regarding chronic bilateral low back pain without radicular/sciatic type leg symptoms.  She does report that she got good relief with her last osteopathic manipulation treatment on August 10 of 2021.  She has had some increased soreness in her low back recently, going across her low back (typically it has been more on the left side), which she attributes to a possible bike ride.  She says that at times it can be a stabbing or sharp pain, but in general it is more of a sore or muscle type pain.  She denies any radiation of pain going down into the legs.  No numbness tingling or weakness in the legs.  She is rating her pain today at a 4 out of 10.  At worst it is a 4 out of 10.  At best is a 1 out of 10.  Her pain is worse with sudden movement and with lifting.  She does feel better with osteopathic manipulation, doing her physical therapy exercises, and taking medication.  Other than the sharp pains with sudden movements, she denies any new symptoms.  She continues to take aspirin as needed.  She is taking gabapentin and amitriptyline at bedtime which she finds are helpful.    Past medical history is reviewed and is unchanged in the interim.    Family  history is reviewed and is unchanged in the interim.    Review of Systems: Pertinent negatives include no numbness, tingling, weakness, headaches, fevers, chills, unexplained weight loss, bowel incontinence, bladder incontinence, trips, stumbles, falls.  All others reviewed and are negative.     Objective:   CONSTITUTIONAL:  Vital signs as above.  No acute distress.  The patient is well nourished and well groomed.    PSYCHIATRIC:  The patient is awake, alert, oriented to person, place and time.  The patient is answering questions appropriately with clear speech.  Normal affect.  SKIN:  Skin over the face, posterior torso, bilateral upper and lower extremities is clean, dry, intact without rashes.  MUSCULOSKELETAL:  Gait is non-antalgic.  The patient is able to transfer independently.      OSTEOPATHIC STRUCTURAL EXAM:  Negative standing flexion test.  Symmetric iliac crest.  Lumbar spine: L3-5 flex, rotated/side bent left  Sacrum: Right unilateral sacral flexion  Innominates/Pelvis: Anterior/inferior right, posterior/superior left  Thoracic spine: T10-12 flexed, rotated/side bent right  Rib cage: First rib elevated on the left  Cervical spine: C2 flexed, rotated/side bent right  Head/OA joint: Side bent right/rotated left    OMT:  TREATMENTS IN PARENTHESES  Lumbar spine: L3-5 flex, rotated/side bent left  (Muscle energy, patient in lateral recumbent)  Sacrum: Right unilateral sacral flexion (Myofascial release, patient prone).  Innominates/Pelvis: Anterior/inferior right, posterior/superior left  (Muscle energy with the patient supine).  Thoracic spine: T10-12 flexed, rotated/side bent right (Myofascial release, patient prone).  Rib cage: First rib elevated on the left (Myofascial release, patient prone).  Cervical spine: C2 flexed, rotated/side bent right  (Muscle energy with the patient supine).  Head/OA joint: Side bent right/rotated left (Myofascial release, patient prone).      Again, thank you for allowing me  to participate in the care of your patient.        Sincerely,        Sylvia Young, DO

## 2021-09-21 NOTE — PATIENT INSTRUCTIONS
Ama,    Please rest today and drink plenty of water.      It is normal to have soreness following the treatment.  Please use ice over the sore areas.  You may take your usual pain medications.  Please call the clinic at [phone] or contact Dr. Gao via [employer] if the soreness is concerning to you.      Please follow-up in 4 weeks.

## 2021-10-12 NOTE — PROGRESS NOTES
Assessment:   Holly Wood (AKA Ama)  is a 71 year old y.o. female with past medical history significant for macular degeneration who presents today for follow-up regarding chronic bilateral low back pain secondary to sacroiliac joint pain.  She is thinking it is time for repeat sacroiliac joint injections which would be reasonable given that her last injections were about 15 months ago.  She was last seen on September 21 of 2021, at which time she was treated with osteopathic manipulation.  She continues to note good relief with this type of treatment.  She reports that she did tripped in her garage over a pool and landed fairly hard on her knees, her left elbow and her left shoulder.  These areas seem to be somewhat improving.  She continues to have multiple areas of osteopathic somatic dysfunction as listed below.  She remains without any red flag symptoms..      PSP:  Dr. Gao (transfer of care from [facility])       Plan:     A shared decision making plan was used.  The patient's values and choices were respected.  The following represents what was discussed and decided upon by the physician and the patient.      1.  DIAGNOSTIC TESTS:  No further diagnostic tests are necessary at this time.  She does not have any radicular or red flag symptoms that would necessitate MRI imaging.  -I did offer her x-ray imaging of her left shoulder and her left wrist after her fall, but she declined stating that it seems to be improving, and does not believe that she fractured anything.  We can order x-rays of her left shoulder and/or wrist and or bilateral knees if these do not improve as expected.  2.  PHYSICAL THERAPY: No further physical therapy at this time.  The patient has previously completed physical therapy in the fall 2020 and is encouraged to continue doing the home exercise program daily.    3.  MEDICATIONS:  No changes to the medications today.    -She can continue with gabapentin 300 mg at bedtime.  -She  can continue with Elavil 25 mg at bedtime.  4.  INTERVENTIONS: She is deemed appropriate for continued osteopathic manipulation.  Osteopathic manipulation was performed to 7 areas today.  The patient tolerated the treatment well and reported relief immediately afterwards.   Please see below for the osteopathic manipulation that was performed today.  -She would like to proceed with repeat bilateral sacroiliac joint injections.  The last time she had these done were in July 2020 and she had significant relief with them.  I did place an order for this and she can schedule this 1 week out.  -She had a dose of her Covid vaccine on March 12 of 2021.  5.  PATIENT EDUCATION:    - The patient was advised to rest today and drink plenty of water.  Normal activities can be resumed as tolerated tomorrow.    - The patient was told that soreness following the treatment is normal and should improve within a few days.  Ice should be used (as opposed to heat) if soreness occurs.  If the soreness is concerning, the clinic should be notified so further instructions can be given.  6.  FOLLOW-UP: Patient follow-up in 1 week for the bilateral sacroiliac joint injections.  The patient is asked to follow-up in 4 weeks for further OMT.. If there are any questions/concerns or any significant worsening of pain prior to that time, the patient is asked to call the clinic via the nurse navigation line or via [employer].     Subjective:     Holly Wood (AKA Ama) is a 71 year old female who presents today for follow-up regarding chronic bilateral low back pain without radicular/sciatic type leg symptoms.  The patient reports that this pain has been worsening recently, and she questions if it is time to repeat the bilateral sacroiliac joint injections.  She reports that she had very good relief with the bilateral SI joint injections that were performed in July 2020.  She does report that she has had some increased pain in her back and then some  new pain in her left shoulder and her left wrist after a fall on Sunday.  She says that she tripped over a tool in her dark garage.  She has been icing and taking over-the-counter analgesics and these are slowly improving.  She does not think that she fractured anything.  She is having some increase in pain in her bilateral knees.  She notices this mainly with going up and down the stairs.  She questions if it is time to get repeat knee injections.  Overall she is rating her pain today at a 4 out of 10.  At worst is an 8 out of 10.  Best is a 2 out of 10.  Her pain is worse with lifting, housework, walking.  Again the stairs aggravate her knee pain.  She does feel better with medications (aspirin and gabapentin) as well as stretching, strengthening exercises and doing osteopathic manipulation.  She denies any other new symptoms at this time.    Past medical history is reviewed and is unchanged in the interim.    Family history is reviewed and is unchanged in the interim.    Review of Systems: Positive for a fall as stated above..  Pertinent negatives include no headaches, numbness, tingling, weakness, fevers, chills, unexplained weight loss, bowel incontinence, bladder incontinence. All others reviewed and are negative.     Objective:   CONSTITUTIONAL:  Vital signs as above.  No acute distress.  The patient is well nourished and well groomed.    PSYCHIATRIC:  The patient is awake, alert, oriented to person, place and time.  The patient is answering questions appropriately with clear speech.  Normal affect.  SKIN:  Skin over the face, posterior torso, bilateral upper and lower extremities is clean, dry, intact without rashes.  MUSCULOSKELETAL:  Gait is non-antalgic.  The patient is able to transfer independently.      OSTEOPATHIC STRUCTURAL EXAM:  Negative standing flexion test.  Symmetric iliac crest.  Lumbar spine: L4-5 flex, rotated/side bent left  Sacrum: Right unilateral sacral flexion  Innominates/Pelvis:  Anterior/inferior right, posterior/superior left  Thoracic spine: T1 flexed, rotated/side bent right  Rib cage: First rib elevated on the left  Cervical spine: C2-3 flexed, rotated/side bent right  Head/OA joint: Sidebent right/rotated left    OMT:  TREATMENTS IN PARENTHESES  Lumbar spine: L4-5 flex, rotated/side bent left  (Muscle energy, patient in lateral recumbent)  Sacrum: Right unilateral sacral flexion (Myofascial release, patient prone).  Innominates/Pelvis: Anterior/inferior right, posterior/superior left  (Muscle energy with the patient supine).  Thoracic spine: T1 flexed, rotated/side bent right (Myofascial release, patient supine)  Rib cage: First rib elevated on the left (Myofascial release, patient supine)  Cervical spine: C2-3 flexed, rotated/side bent right  (Muscle energy with the patient supine).  Head/OA joint: Sidebent right/rotated left  (Muscle energy with the patient supine).

## 2021-10-19 ENCOUNTER — OFFICE VISIT (OUTPATIENT)
Dept: PHYSICAL MEDICINE AND REHAB | Facility: CLINIC | Age: 71
End: 2021-10-19
Payer: COMMERCIAL

## 2021-10-19 VITALS — TEMPERATURE: 98.3 F | HEART RATE: 89 BPM | DIASTOLIC BLOOD PRESSURE: 83 MMHG | SYSTOLIC BLOOD PRESSURE: 174 MMHG

## 2021-10-19 DIAGNOSIS — M99.03 LUMBAR REGION SOMATIC DYSFUNCTION: ICD-10-CM

## 2021-10-19 DIAGNOSIS — G89.29 CHRONIC BILATERAL LOW BACK PAIN WITHOUT SCIATICA: Primary | ICD-10-CM

## 2021-10-19 DIAGNOSIS — M54.50 CHRONIC BILATERAL LOW BACK PAIN WITHOUT SCIATICA: Primary | ICD-10-CM

## 2021-10-19 DIAGNOSIS — M53.3 SACROILIAC JOINT PAIN: ICD-10-CM

## 2021-10-19 DIAGNOSIS — M99.04 SACRAL REGION SOMATIC DYSFUNCTION: ICD-10-CM

## 2021-10-19 DIAGNOSIS — M99.01 CERVICAL SOMATIC DYSFUNCTION: ICD-10-CM

## 2021-10-19 DIAGNOSIS — M99.00 HEAD REGION SOMATIC DYSFUNCTION: ICD-10-CM

## 2021-10-19 DIAGNOSIS — M99.02 THORACIC REGION SOMATIC DYSFUNCTION: ICD-10-CM

## 2021-10-19 DIAGNOSIS — M99.05 SOMATIC DYSFUNCTION OF PELVIS REGION: ICD-10-CM

## 2021-10-19 DIAGNOSIS — M99.08 RIB CAGE REGION SOMATIC DYSFUNCTION: ICD-10-CM

## 2021-10-19 PROCEDURE — 99213 OFFICE O/P EST LOW 20 MIN: CPT | Mod: 25 | Performed by: PHYSICAL MEDICINE & REHABILITATION

## 2021-10-19 PROCEDURE — 98928 OSTEOPATH MANJ 7-8 REGIONS: CPT | Performed by: PHYSICAL MEDICINE & REHABILITATION

## 2021-10-19 ASSESSMENT — PAIN SCALES - GENERAL: PAINLEVEL: MODERATE PAIN (4)

## 2021-10-19 NOTE — PATIENT INSTRUCTIONS
Ama    Bilateral SI joint injections have  been ordered today.      Please note that since your immune system is weakened from the cortisone, having the FLU or COVID-19 vaccine/shot may be less effective if you have a vaccine within 2 weeks from your cortisone injection.  It is advised to wait 2 weeks after your cortisone injection to have the  FLU or COVID-19 vaccine/shot (or if you have the vaccine/shot first, wait 2 weeks before you have the cortisone injection).    Please schedule this injection at least 1 week  from now to allow time for insurance prior authorization.  On the day of your injection, you cannot be sick or taking antibiotics.  If you become sick and are prescribed, please call the clinic so your injection can be rescheduled for once you have completed your antibiotics.  You will need to bring a  with you for your injection.   If you have any questions or concerns prior to your injection, please do not hesitate to call the nurse navigation line at [phone] or contact Dr. Gao through [employer].    Please rest today and drink plenty of water.      It is normal to have soreness following the treatment.  Please use ice over the sore areas.  You may take your usual pain medications.  Please call the clinic at [phone] or contact Dr. Gao via [employer] if the soreness is concerning to you.      Please follow-up in 4 weeks for OMM.

## 2021-10-19 NOTE — LETTER
10/19/2021         RE: Holly Wood  [address] [address]        Dear Colleague,    Thank you for referring your patient, Holly Wood, to the [facility] [place]. Please see a copy of my visit note below.    Assessment:   Holly Wood (AKA Ama)  is a 71 year old y.o. female with past medical history significant for macular degeneration who presents today for follow-up regarding chronic bilateral low back pain secondary to sacroiliac joint pain.  She is thinking it is time for repeat sacroiliac joint injections which would be reasonable given that her last injections were about 15 months ago.  She was last seen on September 21 of 2021, at which time she was treated with osteopathic manipulation.  She continues to note good relief with this type of treatment.  She reports that she did tripped in her garage over a pool and landed fairly hard on her knees, her left elbow and her left shoulder.  These areas seem to be somewhat improving.  She continues to have multiple areas of osteopathic somatic dysfunction as listed below.  She remains without any red flag symptoms..      PSP:  Dr. Gao (transfer of care from [facility])       Plan:     A shared decision making plan was used.  The patient's values and choices were respected.  The following represents what was discussed and decided upon by the physician and the patient.      1.  DIAGNOSTIC TESTS:  No further diagnostic tests are necessary at this time.  She does not have any radicular or red flag symptoms that would necessitate MRI imaging.  -I did offer her x-ray imaging of her left shoulder and her left wrist after her fall, but she declined stating that it seems to be improving, and does not believe that she fractured anything.  We can order x-rays of her left shoulder and/or wrist and or bilateral knees if these do not improve as expected.  2.  PHYSICAL THERAPY: No further physical therapy at this  time.  The patient has previously completed physical therapy in the fall 2020 and is encouraged to continue doing the home exercise program daily.    3.  MEDICATIONS:  No changes to the medications today.    -She can continue with gabapentin 300 mg at bedtime.  -She can continue with Elavil 25 mg at bedtime.  4.  INTERVENTIONS: She is deemed appropriate for continued osteopathic manipulation.  Osteopathic manipulation was performed to 7 areas today.  The patient tolerated the treatment well and reported relief immediately afterwards.   Please see below for the osteopathic manipulation that was performed today.  -She would like to proceed with repeat bilateral sacroiliac joint injections.  The last time she had these done were in July 2020 and she had significant relief with them.  I did place an order for this and she can schedule this 1 week out.  -She had a dose of her Covid vaccine on March 12 of 2021.  5.  PATIENT EDUCATION:    - The patient was advised to rest today and drink plenty of water.  Normal activities can be resumed as tolerated tomorrow.    - The patient was told that soreness following the treatment is normal and should improve within a few days.  Ice should be used (as opposed to heat) if soreness occurs.  If the soreness is concerning, the clinic should be notified so further instructions can be given.  6.  FOLLOW-UP: Patient follow-up in 1 week for the bilateral sacroiliac joint injections.  The patient is asked to follow-up in 4 weeks for further OMT.. If there are any questions/concerns or any significant worsening of pain prior to that time, the patient is asked to call the clinic via the nurse navigation line or via [employer].     Subjective:     Holly ISAAC Israel (AKA Ama) is a 71 year old female who presents today for follow-up regarding chronic bilateral low back pain without radicular/sciatic type leg symptoms.  The patient reports that this pain has been worsening recently, and she  questions if it is time to repeat the bilateral sacroiliac joint injections.  She reports that she had very good relief with the bilateral SI joint injections that were performed in July 2020.  She does report that she has had some increased pain in her back and then some new pain in her left shoulder and her left wrist after a fall on Sunday.  She says that she tripped over a tool in her dark garage.  She has been icing and taking over-the-counter analgesics and these are slowly improving.  She does not think that she fractured anything.  She is having some increase in pain in her bilateral knees.  She notices this mainly with going up and down the stairs.  She questions if it is time to get repeat knee injections.  Overall she is rating her pain today at a 4 out of 10.  At worst is an 8 out of 10.  Best is a 2 out of 10.  Her pain is worse with lifting, housework, walking.  Again the stairs aggravate her knee pain.  She does feel better with medications (aspirin and gabapentin) as well as stretching, strengthening exercises and doing osteopathic manipulation.  She denies any other new symptoms at this time.    Past medical history is reviewed and is unchanged in the interim.    Family history is reviewed and is unchanged in the interim.    Review of Systems: Positive for a fall as stated above..  Pertinent negatives include no headaches, numbness, tingling, weakness, fevers, chills, unexplained weight loss, bowel incontinence, bladder incontinence. All others reviewed and are negative.     Objective:   CONSTITUTIONAL:  Vital signs as above.  No acute distress.  The patient is well nourished and well groomed.    PSYCHIATRIC:  The patient is awake, alert, oriented to person, place and time.  The patient is answering questions appropriately with clear speech.  Normal affect.  SKIN:  Skin over the face, posterior torso, bilateral upper and lower extremities is clean, dry, intact without rashes.  MUSCULOSKELETAL:  Gait  is non-antalgic.  The patient is able to transfer independently.      OSTEOPATHIC STRUCTURAL EXAM:  Negative standing flexion test.  Symmetric iliac crest.  Lumbar spine: L4-5 flex, rotated/side bent left  Sacrum: Right unilateral sacral flexion  Innominates/Pelvis: Anterior/inferior right, posterior/superior left  Thoracic spine: T1 flexed, rotated/side bent right  Rib cage: First rib elevated on the left  Cervical spine: C2-3 flexed, rotated/side bent right  Head/OA joint: Sidebent right/rotated left    OMT:  TREATMENTS IN PARENTHESES  Lumbar spine: L4-5 flex, rotated/side bent left  (Muscle energy, patient in lateral recumbent)  Sacrum: Right unilateral sacral flexion (Myofascial release, patient prone).  Innominates/Pelvis: Anterior/inferior right, posterior/superior left  (Muscle energy with the patient supine).  Thoracic spine: T1 flexed, rotated/side bent right (Myofascial release, patient supine)  Rib cage: First rib elevated on the left (Myofascial release, patient supine)  Cervical spine: C2-3 flexed, rotated/side bent right  (Muscle energy with the patient supine).  Head/OA joint: Sidebent right/rotated left  (Muscle energy with the patient supine).        Again, thank you for allowing me to participate in the care of your patient.        Sincerely,        Sylvia Young DO

## 2021-11-23 ENCOUNTER — ANCILLARY PROCEDURE (OUTPATIENT)
Dept: PHYSICAL MEDICINE AND REHAB | Facility: CLINIC | Age: 71
End: 2021-11-23
Payer: COMMERCIAL

## 2021-11-23 VITALS
DIASTOLIC BLOOD PRESSURE: 88 MMHG | HEART RATE: 81 BPM | SYSTOLIC BLOOD PRESSURE: 164 MMHG | OXYGEN SATURATION: 98 % | TEMPERATURE: 98.4 F

## 2021-11-23 DIAGNOSIS — M53.3 SACROILIAC JOINT PAIN: ICD-10-CM

## 2021-11-23 PROCEDURE — 27096 INJECT SACROILIAC JOINT: CPT | Mod: 50 | Performed by: PHYSICAL MEDICINE & REHABILITATION

## 2021-11-23 RX ORDER — ROPIVACAINE HYDROCHLORIDE 5 MG/ML
INJECTION, SOLUTION EPIDURAL; INFILTRATION; PERINEURAL
Status: COMPLETED | OUTPATIENT
Start: 2021-11-23 | End: 2021-11-23

## 2021-11-23 RX ORDER — LIDOCAINE HYDROCHLORIDE 10 MG/ML
INJECTION, SOLUTION EPIDURAL; INFILTRATION; INTRACAUDAL; PERINEURAL
Status: COMPLETED | OUTPATIENT
Start: 2021-11-23 | End: 2021-11-23

## 2021-11-23 RX ORDER — METHYLPREDNISOLONE ACETATE 80 MG/ML
INJECTION, SUSPENSION INTRA-ARTICULAR; INTRALESIONAL; INTRAMUSCULAR; SOFT TISSUE
Status: COMPLETED | OUTPATIENT
Start: 2021-11-23 | End: 2021-11-23

## 2021-11-23 RX ADMIN — METHYLPREDNISOLONE ACETATE 80 MG: 80 INJECTION, SUSPENSION INTRA-ARTICULAR; INTRALESIONAL; INTRAMUSCULAR; SOFT TISSUE at 13:52

## 2021-11-23 RX ADMIN — ROPIVACAINE HYDROCHLORIDE 3 ML: 5 INJECTION, SOLUTION EPIDURAL; INFILTRATION; PERINEURAL at 13:52

## 2021-11-23 RX ADMIN — LIDOCAINE HYDROCHLORIDE 2 ML: 10 INJECTION, SOLUTION EPIDURAL; INFILTRATION; INTRACAUDAL; PERINEURAL at 13:52

## 2021-11-23 ASSESSMENT — PAIN SCALES - GENERAL
PAINLEVEL: MODERATE PAIN (5)
PAINLEVEL: EXTREME PAIN (8)

## 2021-11-23 NOTE — PATIENT INSTRUCTIONS
Please follow up two weeks post procedure with Dr Gao to evaluate your plan of care.       DISCHARGE INSTRUCTIONS    During office hours (8:00 a.m.- 4:00 p.m.) questions or concerns may be answered  by calling [facility] Center Navigation Nurses at  [phone].  Messages received after hours will be returned the following business day.      In the case of an emergency, please dial 911 or seek assistance at the nearest Emergency Room/Urgent Care facility.     All Patients:    ? You may experience an increase in your symptoms for the first 2 days (It may take anywhere between 2 days- 2 weeks for the steroid to have maximum effect).    ? You may use ice on the injection site, as frequently as 20 minutes each hour if needed.    ? You may take your pain medicine.    ? You may continue taking your regular medication after your injection. If you have had a Medial Branch Block you may resume pain medication once your pain diary is completed.    ? You may shower. No swimming, tub bath or hot tub for 48 hours.  You may remove your bandaid/bandage as soon as you are home.    ? You may resume light activities, as tolerated.    ? Resume your usual diet as tolerated.    ? It is strongly advised that you do not drive for 1-3 hours post injection.    ? If you have had oral sedation:  Do not drive for 8 hours post injection.      ? If you have had IV sedation:  Do not drive for 24 hours post injection.  Do not operate hazardous machinery or make important personal/business decisions for 24 hours.      POSSIBLE STEROID SIDE EFFECTS (If steroid/cortisone was used for your procedure)    -If you experience these symptoms, it should only last for a short period      Swelling of the legs                Skin redness (flushing)       Mouth (oral) irritation     Blood sugar (glucose) levels              Sweats                      Mood changes    Headache    Sleeplessness    Weakened immune system for up to 14 days, which could increase the  risk of james the COVID-19 virus and/or experiencing more severe symptoms of the disease, if exposed.    Decreased effectiveness of the flu vaccine if given within 2 weeks of the steroid.         POSSIBLE PROCEDURE SIDE EFFECTS  -Call the Spine Center if you are concerned    Increased Pain             Increased numbness/tingling        Nausea/Vomiting            Bruising/bleeding at site        Redness or swelling                                                Difficulty walking        Weakness             Fever greater than 100.5    *In the event of a severe headache after an epidural steroid injection that is relieved by lying down, please call the [facility] to speak with a clinical staff member*

## 2021-11-23 NOTE — Clinical Note
11/23/2021         RE: Holly Wood  [address] [address]        Dear Colleague,    Thank you for referring your patient, Holly Wood, to the [facility] [place]. Please see a copy of my visit note below.    No notes on file    Again, thank you for allowing me to participate in the care of your patient.        Sincerely,        Sylvia Young, DO

## 2021-12-07 NOTE — PROGRESS NOTES
Assessment:   Holly Wood (AKA Ama) is a 71 year old y.o. female with past medical history significant for macular degeneration who presents today for follow-up regarding chronic bilateral low back pain secondary to sacroiliac joint pain.  She is status post bilateral SI joint injections on 11-23-21.  She is reporting 80% relief following these injections.  She is now reporting return of her chronic bilateral knee pain secondary to primary osteoarthritis of the knees.  She has mild manageable low back pain.  She continues to have multiple areas of osteopathic somatic dysfunction, which are listed below.  She remains without any red flag symptoms.      PSP:  Dr. Gao (Transfer of care from [facility])       Plan:     A shared decision making plan was used.  The patient's values and choices were respected.  The following represents what was discussed and decided upon by the physician and the patient.      1.  DIAGNOSTIC TESTS:  No further diagnostic tests are necessary at this time.  She does not have any radicular or red flag symptoms that would necessitate MRI imaging.  2.  PHYSICAL THERAPY: No further physical therapy at this time.  The patient has previously completed physical therapy in the fall of 2020 and is encouraged to continue doing the home exercise program daily.    3.  MEDICATIONS:  No changes to the medications today.    -She can continue with gabapentin 300 mg at bedtime.  -She can continue with Elavil 25 mg at bedtime.  -She can continue with over-the-counter aspirin.  4.  INTERVENTIONS:  She is deemed appropriate for continued osteopathic manipulation..  Osteopathic manipulation was performed to 7 areas today.  The patient tolerated the treatment well and she reported relief immediately afterwards.   Please see below for the osteopathic manipulation that was performed today.  -I did place an order for bilateral intra-articular knee joint injections with corticosteroid.  She has had good  relief with these in the past.  These will be performed under ultrasound guidance with either Dr. Beltre or Dr. Warren.  - She is status post bilateral SI joint injections on 11-23-21.  5.  PATIENT EDUCATION:    -She states that she did not get the tool that was recommended the last time because she could not remember the name of it.  I did remind her that this is called an occipital release tool.  She can purchase one online.  They are usually very inexpensive.  - The patient was advised to rest today and drink plenty of water.  Normal activities can be resumed as tolerated tomorrow.    - The patient was told that soreness following the treatment is normal and should improve within a few days.  Ice should be used (as opposed to heat) if soreness occurs.  If the soreness is concerning, the clinic should be notified so further instructions can be given.  -All of her questions were answered to her satisfaction today.  She was in agreement the treatment plan.  6.  FOLLOW-UP: The patient is asked to follow-up in 4 weeks.. If there are any questions/concerns or any significant worsening of pain prior to that time, the patient is asked to call the clinic via the nurse navigation line or via [employer].     Subjective:     Holly Wood (AKA Ama) is a 71 year old female who presents today for follow-up regarding chronic bilateral low back pain secondary to sacroiliac joint pain.  She is status post bilateral sacroiliac joint injections on November 23 of 2021.  She is reporting 80% relief following these injections.  She does have a mild amount of low back pain, mainly over the sacrum.  She denies any radiation of pain going down into the legs.  No numbness tingling or weakness in the legs.  She is reporting return of her bilateral knee pain.  She reports that both knees are pretty much equally painful.  She rates her overall pain today at a 2 out of 10.  At worst is an 8 out of 10.  Best is a 2 out of 10.  Pain is  worse with lifting, walking too far, standing too long.  She does feel better with doing her exercises and stretches recommended by her physical therapist.  Her aspirin and gabapentin are helpful.  She is wondering about a repeat injection for her knees, as this has worked quite well for her in the past.    Past medical history is reviewed and is unchanged in the interim.    Family history is reviewed and is unchanged in the interim.    Review of Systems:  Pertinent negatives include no numbness, tingling, weakness, headaches, fevers, chills, unexplained weight loss, bowel incontinence, bladder incontinence, trips, stumbles, falls.  All others reviewed and are negative.     Objective:   CONSTITUTIONAL:  Vital signs as above.  No acute distress.  The patient is well nourished and well groomed.    PSYCHIATRIC:  The patient is awake, alert, oriented to person, place and time.  The patient is answering questions appropriately with clear speech.  Normal affect.  SKIN:  Skin over the face, posterior torso, bilateral upper and lower extremities is clean, dry, intact without rashes.  MUSCULOSKELETAL:  Gait is non-antalgic.  The patient is able to transfer independently.      OSTEOPATHIC STRUCTURAL EXAM:  Negative standing flexion test.  Symmetric iliac crest.  Lumbar spine: L4-5 flex, rotated/side bent left  Sacrum: Right unilateral sacral flexion, ligament tenderness over the apex of the sacrum bilaterally  Innominates/Pelvis: Anterior/inferior right, posterior/superior left  Thoracic spine: T10-12 flexed, rotated/side bent right  Rib cage: First rib elevated on the left  Cervical spine: C2-3 flexed, rotated/side bent right  Head/OA joint: Side bent right/rotated left    OMT:  TREATMENTS IN PARENTHESES  Lumbar spine: L4-5 flex, rotated/side bent left  (Muscle energy, patient in lateral recumbent)  Sacrum: Right unilateral sacral flexion (Myofascial release, patient prone)., ligament tenderness over the apex of the sacrum  bilaterally (balance ligamentous tension, patient prone)  Innominates/Pelvis: Anterior/inferior right, posterior/superior left  (Muscle energy with the patient supine).  Thoracic spine: T10-12 flexed, rotated/side bent right (Muscle energy, patient seated)  Rib cage: First rib elevated on the left (Myofascial release, patient supine)  Cervical spine: C2-3 flexed, rotated/side bent right  (Muscle energy with the patient supine).  Head/OA joint: Side bent right/rotated left (Myofascial release, patient supine)

## 2021-12-08 ENCOUNTER — OFFICE VISIT (OUTPATIENT)
Dept: PHYSICAL MEDICINE AND REHAB | Facility: CLINIC | Age: 71
End: 2021-12-08
Payer: COMMERCIAL

## 2021-12-08 VITALS — TEMPERATURE: 98.8 F | HEART RATE: 85 BPM

## 2021-12-08 DIAGNOSIS — M99.01 CERVICAL SOMATIC DYSFUNCTION: ICD-10-CM

## 2021-12-08 DIAGNOSIS — M99.08 RIB CAGE REGION SOMATIC DYSFUNCTION: ICD-10-CM

## 2021-12-08 DIAGNOSIS — M99.04 SACRAL REGION SOMATIC DYSFUNCTION: ICD-10-CM

## 2021-12-08 DIAGNOSIS — G89.29 CHRONIC BILATERAL LOW BACK PAIN WITHOUT SCIATICA: Primary | ICD-10-CM

## 2021-12-08 DIAGNOSIS — M99.02 THORACIC REGION SOMATIC DYSFUNCTION: ICD-10-CM

## 2021-12-08 DIAGNOSIS — M17.0 PRIMARY OSTEOARTHRITIS OF BOTH KNEES: ICD-10-CM

## 2021-12-08 DIAGNOSIS — M99.05 SOMATIC DYSFUNCTION OF PELVIS REGION: ICD-10-CM

## 2021-12-08 DIAGNOSIS — M54.50 CHRONIC BILATERAL LOW BACK PAIN WITHOUT SCIATICA: Primary | ICD-10-CM

## 2021-12-08 DIAGNOSIS — M99.00 HEAD REGION SOMATIC DYSFUNCTION: ICD-10-CM

## 2021-12-08 DIAGNOSIS — M99.03 LUMBAR REGION SOMATIC DYSFUNCTION: ICD-10-CM

## 2021-12-08 PROCEDURE — 99213 OFFICE O/P EST LOW 20 MIN: CPT | Mod: 25 | Performed by: PHYSICAL MEDICINE & REHABILITATION

## 2021-12-08 PROCEDURE — 98928 OSTEOPATH MANJ 7-8 REGIONS: CPT | Performed by: PHYSICAL MEDICINE & REHABILITATION

## 2021-12-08 ASSESSMENT — PAIN SCALES - GENERAL: PAINLEVEL: MILD PAIN (2)

## 2021-12-08 NOTE — PATIENT INSTRUCTIONS
Ama,        The name of the tool that we talked about is called and occipital release tool.  You can purchase 1 of these online.  There are several options on [employer].    I have ordered bilateral knee injections.  You can schedule these under ultrasound guidance with either Dr. Beltre or Dr. Warren.  Please call [phone] to schedule these.     Please rest today and drink plenty of water.      It is normal to have soreness following the treatment.  Please use ice over the sore areas.  You may take your usual pain medications.  Please call the clinic at [phone] or contact Dr. Gao via [facility] if the soreness is concerning to you.      Please follow-up in 4 weeks.

## 2022-01-07 ENCOUNTER — LAB REQUISITION (OUTPATIENT)
Dept: LAB | Facility: CLINIC | Age: 72
End: 2022-01-07

## 2022-01-07 DIAGNOSIS — R30.0 DYSURIA: ICD-10-CM

## 2022-01-07 PROCEDURE — 87086 URINE CULTURE/COLONY COUNT: CPT | Performed by: FAMILY MEDICINE

## 2022-01-09 LAB — BACTERIA UR CULT: NO GROWTH

## 2022-01-12 ENCOUNTER — RADIOLOGY INJECTION OFFICE VISIT (OUTPATIENT)
Dept: PHYSICAL MEDICINE AND REHAB | Facility: CLINIC | Age: 72
End: 2022-01-12
Attending: PHYSICAL MEDICINE & REHABILITATION
Payer: COMMERCIAL

## 2022-01-12 ENCOUNTER — OFFICE VISIT (OUTPATIENT)
Dept: PHYSICAL MEDICINE AND REHAB | Facility: CLINIC | Age: 72
End: 2022-01-12
Payer: COMMERCIAL

## 2022-01-12 VITALS — HEART RATE: 90 BPM | SYSTOLIC BLOOD PRESSURE: 173 MMHG | DIASTOLIC BLOOD PRESSURE: 90 MMHG | TEMPERATURE: 98 F

## 2022-01-12 VITALS — DIASTOLIC BLOOD PRESSURE: 90 MMHG | HEART RATE: 90 BPM | TEMPERATURE: 98 F | SYSTOLIC BLOOD PRESSURE: 173 MMHG

## 2022-01-12 DIAGNOSIS — M99.02 THORACIC REGION SOMATIC DYSFUNCTION: ICD-10-CM

## 2022-01-12 DIAGNOSIS — M99.00 HEAD REGION SOMATIC DYSFUNCTION: ICD-10-CM

## 2022-01-12 DIAGNOSIS — M99.03 LUMBAR REGION SOMATIC DYSFUNCTION: ICD-10-CM

## 2022-01-12 DIAGNOSIS — M99.04 SACRAL REGION SOMATIC DYSFUNCTION: ICD-10-CM

## 2022-01-12 DIAGNOSIS — M54.50 CHRONIC BILATERAL LOW BACK PAIN WITHOUT SCIATICA: Primary | ICD-10-CM

## 2022-01-12 DIAGNOSIS — M99.05 SOMATIC DYSFUNCTION OF PELVIS REGION: ICD-10-CM

## 2022-01-12 DIAGNOSIS — M99.08 RIB CAGE REGION SOMATIC DYSFUNCTION: ICD-10-CM

## 2022-01-12 DIAGNOSIS — M17.0 PRIMARY OSTEOARTHRITIS OF BOTH KNEES: ICD-10-CM

## 2022-01-12 DIAGNOSIS — G89.29 CHRONIC BILATERAL LOW BACK PAIN WITHOUT SCIATICA: Primary | ICD-10-CM

## 2022-01-12 DIAGNOSIS — M99.01 CERVICAL SOMATIC DYSFUNCTION: ICD-10-CM

## 2022-01-12 PROCEDURE — 99213 OFFICE O/P EST LOW 20 MIN: CPT | Mod: 25 | Performed by: PHYSICAL MEDICINE & REHABILITATION

## 2022-01-12 PROCEDURE — 98928 OSTEOPATH MANJ 7-8 REGIONS: CPT | Performed by: PHYSICAL MEDICINE & REHABILITATION

## 2022-01-12 PROCEDURE — 20611 DRAIN/INJ JOINT/BURSA W/US: CPT | Mod: 50 | Performed by: PAIN MEDICINE

## 2022-01-12 RX ORDER — LIDOCAINE HYDROCHLORIDE 10 MG/ML
INJECTION, SOLUTION EPIDURAL; INFILTRATION; INTRACAUDAL; PERINEURAL
Status: COMPLETED | OUTPATIENT
Start: 2022-01-12 | End: 2022-01-12

## 2022-01-12 RX ORDER — ROPIVACAINE HYDROCHLORIDE 5 MG/ML
INJECTION, SOLUTION EPIDURAL; INFILTRATION; PERINEURAL
Status: COMPLETED | OUTPATIENT
Start: 2022-01-12 | End: 2022-01-12

## 2022-01-12 RX ORDER — METHYLPREDNISOLONE ACETATE 80 MG/ML
INJECTION, SUSPENSION INTRA-ARTICULAR; INTRALESIONAL; INTRAMUSCULAR; SOFT TISSUE
Status: COMPLETED | OUTPATIENT
Start: 2022-01-12 | End: 2022-01-12

## 2022-01-12 RX ADMIN — METHYLPREDNISOLONE ACETATE 80 MG: 80 INJECTION, SUSPENSION INTRA-ARTICULAR; INTRALESIONAL; INTRAMUSCULAR; SOFT TISSUE at 13:27

## 2022-01-12 RX ADMIN — LIDOCAINE HYDROCHLORIDE 2 ML: 10 INJECTION, SOLUTION EPIDURAL; INFILTRATION; INTRACAUDAL; PERINEURAL at 13:27

## 2022-01-12 RX ADMIN — ROPIVACAINE HYDROCHLORIDE 8 ML: 5 INJECTION, SOLUTION EPIDURAL; INFILTRATION; PERINEURAL at 13:27

## 2022-01-12 ASSESSMENT — PAIN SCALES - GENERAL
PAINLEVEL: MILD PAIN (3)
PAINLEVEL: EXTREME PAIN (9)
PAINLEVEL: MILD PAIN (3)

## 2022-01-12 NOTE — PROGRESS NOTES
Assessment:   Holly Wood (AKA Ama)  is a 71 year old y.o. female with past medical history significant for macular degeneration who presents today for follow-up regarding chronic bilateral low back pain without radicular/sciatic type leg symptoms, secondary to sacroiliac joint pain. She reports that her pain has been largely manageable since her last treatment on December 8, 2021. She continues to have multiple areas of osteopathic somatic dysfunction which are listed below. She remains without any red flag symptoms.    The patient continues to have the chronic bilateral knee pain secondary to primary osteoarthritis of the knees. She will be having her bilateral corticosteroid injections this afternoon with Dr. Beltre.      PSP:  Dr. Gao (transfer of care from [facility])       Plan:     A shared decision making plan was used.  The patient's values and choices were respected.  The following represents what was discussed and decided upon by the physician and the patient.      1.  DIAGNOSTIC TESTS:  No further diagnostic tests are necessary at this time.  She does not have any radicular or red flag symptoms that would necessitate an MRI of the low back at this time.  2.  PHYSICAL THERAPY: No further physical therapy at this time.  The patient has previously completed physical therapy in the fall 2020 and is encouraged to continue doing the home exercise program daily.    3.  MEDICATIONS:  No changes to the medications today.    -She can continue with gabapentin 300 mg at bedtime.  -She can continue with Elavil 25 mg at bedtime.  -She can continue with over-the-counter aspirin.  4.  INTERVENTIONS: She is deemed appropriate for continued osteopathic manipulation osteopathic manipulation was performed to 7 areas today.  The patient tolerated the treatment well and she reported some relief immediately afterwards.   Please see below for the osteopathic manipulation that was performed today.  -She is scheduled for  bilateral intra-articular knee joint injections under ultrasound guidance with Dr. Beltre today.  -She is status post bilateral sacroiliac joint injections on November 23, 2021 that provided 80% relief.  -She is status post a right intra-articular knee joint injection on May 13, 2021 that provided significant relief.  5.  PATIENT EDUCATION:    -She will be having upcoming cataract surgery. I explained that she does not need to wait between the cataract surgery and the OMT, but if she cannot lay on her stomach for period of time after the cataract procedure, she should let me know so that we avoid putting her in that position during the osteopathic manipulation treatments.  - The patient was advised to rest today and drink plenty of water.  Normal activities can be resumed as tolerated tomorrow.    - The patient was told that soreness following the treatment is normal and should improve within a few days.  Ice should be used (as opposed to heat) if soreness occurs.  If the soreness is concerning, the clinic should be notified so further instructions can be given.  -She is encouraged to continue using the occipital release tool.  6.  FOLLOW-UP: The patient is asked to follow-up 4 weeks. If there are any questions/concerns or any significant worsening of pain prior to that time, the patient is asked to call the clinic via the nurse navigation line or via [employer].     Subjective:     Holly Wood I (AKA Ama) s a 71 year old female who presents today for follow-up regarding chronic bilateral low back pain without radicular/sciatic type leg symptoms. She reports that her pain has been largely manageable since her last treatment on December 8, 2021. She continues to state that her neck pain is minimal. She is having the bilateral knee pain, but is scheduled for the bilateral knee joint injections this afternoon and she is looking forward to that. She has no major questions or concerns today. She thinks that the  back pain has been more sore recently secondary to the increased activities around the holidays. Pain is located in the low back and into the buttocks. She denies any numbness tingling or weakness in the legs. She rates her pain today at a 3 out of 10. At worst it is a 5 out of 10. At best is a 2 out of 10. Pain is worse with lifting. Does feel better with medications, rest, exercise, and she states that she always feels better with osteopathic manipulation. She denies any new symptoms at this time. She is very diligent about doing her exercises for the low back and for the neck. She continues to take amitriptyline, gabapentin, and amitriptyline and states that she finds these helpful for pain relief.    Past medical history is reviewed and is unchanged in the interim.    Family history is reviewed and is unchanged in the interim.    Review of Systems: Positive for stress incontinence of the bladder that is at baseline..  Pertinent negatives include no numbness, tingling, weakness, fevers, chills, unexplained weight loss, bowel incontinence, bladder incontinence, trips, stumbles, falls.  All others reviewed and are negative.     Objective:   CONSTITUTIONAL:  Vital signs as above.  No acute distress.  The patient is well nourished and well groomed.    PSYCHIATRIC:  The patient is awake, alert, oriented to person, place and time.  The patient is answering questions appropriately with clear speech.  Normal affect.  SKIN:  Skin over the face, posterior torso, bilateral upper and lower extremities is clean, dry, intact without rashes.  MUSCULOSKELETAL:  Gait is non-antalgic.  The patient is able to transfer independently.      OSTEOPATHIC STRUCTURAL EXAM:  Positive standing flexion test on the right. Right inferior iliac crest.  Lumbar spine: Hypertonicity over the left lumbar paraspinal muscles  Sacrum: Right unilateral sacral flexion  Innominates/Pelvis: Right down slipped innominate, anterior/inferior right  Thoracic  spine: T1 flexed, rotated/side bent left  Rib cage: First rib elevated on the left  Cervical spine: C2-3 flexed, rotated/side bent right  Head/OA joint: Side bent right/rotated left    OMT:  TREATMENTS IN PARENTHESES  Lumbar spine: Hypertonicity over the left lumbar paraspinal muscles (Myofascial release, patient prone).  Sacrum: Right unilateral sacral flexion (Myofascial release, patient prone).  Innominates/Pelvis: Right down slipped innominate (Myofascial release, patient prone)., anterior/inferior right  (Muscle energy with the patient supine).  Thoracic spine: T1 flexed, rotated/side bent left (Myofascial release, patient supine)  Rib cage: First rib elevated on the left (Myofascial release, patient supine)  Cervical spine: C2-3 flexed, rotated/side bent right  (Muscle energy with the patient supine).  Head/OA joint: Side bent right/rotated left (Myofascial release, patient supine)

## 2022-01-12 NOTE — PATIENT INSTRUCTIONS
Ama,    You will be in great hands with Dr. Beltre for your knee injections this afternoon.    Please rest today and drink plenty of water.      It is normal to have soreness following the treatment.  Please use ice over the sore areas.  You may take your usual pain medications.  Please call the clinic at [phone]  if the soreness is concerning to you.      Please follow-up in 4 weeks.

## 2022-01-12 NOTE — PATIENT INSTRUCTIONS
Follow-up visit in 2 weeks with Dr. Gao to discuss injection outcome and determine care plan going forward.     DISCHARGE INSTRUCTIONS    During office hours (8:00 a.m.- 4:00 p.m.) questions or concerns may be answered  by calling [facility] Nurses at  [phone].  Messages received after hours will be returned the following business day.      In the case of an emergency, please dial 911 or seek assistance at the nearest Emergency Room/Urgent Care facility.     All Patients:    ? You may experience an increase in your symptoms for the first 2 days (It may take anywhere between 2 days- 2 weeks for the steroid to have maximum effect).    ? You may use ice on the injection site, as frequently as 20 minutes each hour if needed.    ? You may take your pain medicine.    ? You may continue taking your regular medication after your injection. If you have had a Medial Branch Block you may resume pain medication once your pain diary is completed.    ? You may shower. No swimming, tub bath or hot tub for 48 hours.  You may remove your bandaid/bandage as soon as you are home.    ? You may resume light activities, as tolerated.    ? Resume your usual diet as tolerated.    ? It is strongly advised that you do not drive for 1-3 hours post injection.    ? If you have had oral sedation:  Do not drive for 8 hours post injection.      ? If you have had IV sedation:  Do not drive for 24 hours post injection.  Do not operate hazardous machinery or make important personal/business decisions for 24 hours.      POSSIBLE STEROID SIDE EFFECTS (If steroid/cortisone was used for your procedure)    -If you experience these symptoms, it should only last for a short period      Swelling of the legs                Skin redness (flushing)       Mouth (oral) irritation     Blood sugar (glucose) levels              Sweats                      Mood changes    Headache    Sleeplessness    Weakened immune system for up to 14 days,  which could increase the risk of james the COVID-19 virus and/or experiencing more severe symptoms of the disease, if exposed.    Decreased effectiveness of the flu vaccine if given within 2 weeks of the steroid.         POSSIBLE PROCEDURE SIDE EFFECTS  -Call the Spine Center if you are concerned    Increased Pain             Increased numbness/tingling        Nausea/Vomiting            Bruising/bleeding at site        Redness or swelling                                                Difficulty walking        Weakness             Fever greater than 100.5    *In the event of a severe headache after an epidural steroid injection that is relieved by lying down, please call the [facility] to speak with a clinical staff member*

## 2022-02-03 NOTE — PROGRESS NOTES
Assessment:   Holly Wood (AKA Ama)  is a 71 year old y.o. female with past medical history significant for macular degeneration who presents today for follow-up regarding chronic bilateral low back pain without radicular/sciatica type leg symptoms secondary to sacroiliac joint pain.  This pain remains largely manageable with osteopathic manipulation, with her last treatment being on January 12 of 2022 she continues to have multiple areas of osteopathic somatic dysfunction which are listed below.  She remains without any red flag symptoms    She is also following up regarding chronic bilateral knee pain secondary to primary osteoarthritis of the knees.  She is status post bilateral corticosteroid injections into the knees with Dr. Beltre on January 12 of 2022.  At this time, she is reporting good relief following these injections.  She still has some difficulty going up and down stairs and if she pivots but this is better.      PSP:  Dr. Gao        Plan:     A shared decision making plan was used.  The patient's values and choices were respected.  The following represents what was discussed and decided upon by the physician and the patient.      1.  DIAGNOSTIC TESTS:  No further diagnostic tests are necessary at this time.  She does not have any radicular or red flag symptoms that would necessitate an MRI of the low back.  2.  PHYSICAL THERAPY: No further physical therapy at this time.  The patient has previously completed physical therapy in the fall 2020 and is encouraged to continue doing the home exercise program daily.    3.  MEDICATIONS:  No changes to the medications today.    -She can continue with gabapentin 300 mg at bedtime.  -She can continue with Elavil 25 mg at bedtime.  -She can continue with over-the-counter aspirin.  4.  INTERVENTIONS: She is deemed appropriate for continued osteopathic manipulation.  Osteopathic manipulation was performed to 7 areas today.  The patient tolerated the  treatment well and reported some relief immediately afterwards.   Please see below for the osteopathic manipulation that was performed today.  -She underwent bilateral intra-articular corticosteroid knee injections on January 12 of 2022 which provided significant relief of her bilateral knee pain.  -She is status post bilateral sacroiliac joint injections on November 23 of 2021 which provided significant relief of her low back pain.  -She underwent a right intra-articular knee joint injection on May 13 of 2021 which provided significant relief of her knee pain.  5.  PATIENT EDUCATION:    -I did discuss that if the corticosteroid injections into the knees failed to provide long-lasting relief, that she would be a candidate for hyaluronic acid injections into the knees.  - The patient was advised to rest today and drink plenty of water.  Normal activities can be resumed as tolerated tomorrow.    - The patient was told that soreness following the treatment is normal and should improve within a few days.  Ice should be used (as opposed to heat) if soreness occurs.  If the soreness is concerning, the clinic should be notified so further instructions can be given.  -She did bring in her occipital release tool that she recently purchased.  This has been beneficial for her and she can continue to use this  6.  FOLLOW-UP: The patient is asked to follow-up in 4 weeks. If there are any questions/concerns or any significant worsening of pain prior to that time, the patient is asked to call the clinic via the nurse navigation line or via "[employer]     Subjective:     Holly Ramirezhima (AKA Ama) is a 71 year old female who presents today for follow-up regarding chronic bilateral low back pain secondary to sacroiliac joint pain.  Reports that this pain remains largely manageable with osteopathic manipulation.  She is feeling better after her knee injections that she had on January 12 of 2022.  She says that she still has some pain  with going up and down stairs, but overall this is better.  She also notices it if she twists or pivots, that she can have some increased pain in the knee, but again overall this is better.  She has been doing quite a bit of cleaning which she thinks may have aggravated both areas of pain.  She rates her pain today at a 5 out of 10.  At worst is a 9 out of 10.  Best is a 2 out of 10.  Her pain is worse with cleaning, standing, walking.  She does feel better with taking her medications as well as doing her exercises.  She denies any new symptoms.  She has completed physical therapy and is doing exercises on her own at home.  No changes to her medications in the interim.    Past medical history is reviewed and is unchanged in the interim.    Family history is reviewed and is unchanged in the interim.    Review of Systems:  Pertinent negatives include no numbness, tingling, weakness, headaches, fevers, chills, unexplained weight loss, bowel incontinence, bladder incontinence, trips, stumbles, falls.  All others reviewed and are negative.     Objective:   CONSTITUTIONAL:  Vital signs as above.  No acute distress.  The patient is well nourished and well groomed.    PSYCHIATRIC:  The patient is awake, alert, oriented to person, place and time.  The patient is answering questions appropriately with clear speech.  Normal affect.  SKIN:  Skin over the face, posterior torso, bilateral upper and lower extremities is clean, dry, intact without rashes.  MUSCULOSKELETAL:  Gait is non-antalgic.  The patient is able to transfer independently.      OSTEOPATHIC STRUCTURAL EXAM:  Positive standing flexion test on the left.  Left superior iliac crest.  Lumbar spine: Hypertonic left lumbar paraspinal muscles compared to the right  Sacrum: Right unilateral sacral flexion  Innominates/Pelvis: Left up slipped innominate, posterior/superior left  Thoracic spine: T1 flexed, rotated/side bent right  Rib cage: First rib elevated on the  left  Cervical spine: C2-4 flexed, rotated/side bent right  Head/OA joint: Side bent right/rotated left    OMT:  TREATMENTS IN PARENTHESES  Lumbar spine: Hypertonic left lumbar paraspinal muscles compared to the right (Myofascial release, patient prone).  Sacrum: Right unilateral sacral flexion (Myofascial release, patient prone).  Innominates/Pelvis: Left up slipped innominate (Still technique, patient prone)., posterior/superior left  (Muscle energy with the patient supine).  Thoracic spine: T1 flexed, rotated/side bent right (Myofascial release, patient supine)  Rib cage: First rib elevated on the left (Myofascial release, patient supine)  Cervical spine: C2-4 flexed, rotated/side bent right  (Muscle energy with the patient supine).  Head/OA joint: Side bent right/rotated left (Myofascial release, patient supine)

## 2022-02-08 ENCOUNTER — OFFICE VISIT (OUTPATIENT)
Dept: PHYSICAL MEDICINE AND REHAB | Facility: CLINIC | Age: 72
End: 2022-02-08
Payer: COMMERCIAL

## 2022-02-08 VITALS — DIASTOLIC BLOOD PRESSURE: 98 MMHG | SYSTOLIC BLOOD PRESSURE: 154 MMHG | TEMPERATURE: 98.1 F | HEART RATE: 87 BPM

## 2022-02-08 DIAGNOSIS — M54.50 CHRONIC BILATERAL LOW BACK PAIN WITHOUT SCIATICA: Primary | ICD-10-CM

## 2022-02-08 DIAGNOSIS — M99.03 LUMBAR REGION SOMATIC DYSFUNCTION: ICD-10-CM

## 2022-02-08 DIAGNOSIS — M99.08 RIB CAGE REGION SOMATIC DYSFUNCTION: ICD-10-CM

## 2022-02-08 DIAGNOSIS — M99.02 THORACIC REGION SOMATIC DYSFUNCTION: ICD-10-CM

## 2022-02-08 DIAGNOSIS — M99.04 SACRAL REGION SOMATIC DYSFUNCTION: ICD-10-CM

## 2022-02-08 DIAGNOSIS — M99.01 CERVICAL SOMATIC DYSFUNCTION: ICD-10-CM

## 2022-02-08 DIAGNOSIS — M99.00 HEAD REGION SOMATIC DYSFUNCTION: ICD-10-CM

## 2022-02-08 DIAGNOSIS — G89.29 CHRONIC BILATERAL LOW BACK PAIN WITHOUT SCIATICA: Primary | ICD-10-CM

## 2022-02-08 DIAGNOSIS — M99.05 SOMATIC DYSFUNCTION OF PELVIS REGION: ICD-10-CM

## 2022-02-08 DIAGNOSIS — M17.0 PRIMARY OSTEOARTHRITIS OF BOTH KNEES: ICD-10-CM

## 2022-02-08 PROCEDURE — 99213 OFFICE O/P EST LOW 20 MIN: CPT | Mod: 25 | Performed by: PHYSICAL MEDICINE & REHABILITATION

## 2022-02-08 PROCEDURE — 98928 OSTEOPATH MANJ 7-8 REGIONS: CPT | Performed by: PHYSICAL MEDICINE & REHABILITATION

## 2022-02-08 ASSESSMENT — PAIN SCALES - GENERAL: PAINLEVEL: MODERATE PAIN (5)

## 2022-02-08 NOTE — LETTER
2/8/2022         RE: Holly Wood  [address] [facility] [address]        Dear Colleague,    Thank you for referring your patient, Holly Wood, to the [facility] [place]. Please see a copy of my visit note below.    Assessment:   Holly Wood (AKA Ama)  is a 71 year old y.o. female with past medical history significant for macular degeneration who presents today for follow-up regarding chronic bilateral low back pain without radicular/sciatica type leg symptoms secondary to sacroiliac joint pain.  This pain remains largely manageable with osteopathic manipulation, with her last treatment being on January 12 of 2022 she continues to have multiple areas of osteopathic somatic dysfunction which are listed below.  She remains without any red flag symptoms    She is also following up regarding chronic bilateral knee pain secondary to primary osteoarthritis of the knees.  She is status post bilateral corticosteroid injections into the knees with Dr. Beltre on January 12 of 2022.  At this time, she is reporting good relief following these injections.  She still has some difficulty going up and down stairs and if she pivots but this is better.      PSP:  Dr. Gao        Plan:     A shared decision making plan was used.  The patient's values and choices were respected.  The following represents what was discussed and decided upon by the physician and the patient.      1.  DIAGNOSTIC TESTS:  No further diagnostic tests are necessary at this time.  She does not have any radicular or red flag symptoms that would necessitate an MRI of the low back.  2.  PHYSICAL THERAPY: No further physical therapy at this time.  The patient has previously completed physical therapy in the fall 2020 and is encouraged to continue doing the home exercise program daily.    3.  MEDICATIONS:  No changes to the medications today.    -She can continue with gabapentin 300 mg at bedtime.  -She  can continue with Elavil 25 mg at bedtime.  -She can continue with over-the-counter aspirin.  4.  INTERVENTIONS: She is deemed appropriate for continued osteopathic manipulation.  Osteopathic manipulation was performed to 7 areas today.  The patient tolerated the treatment well and reported some relief immediately afterwards.   Please see below for the osteopathic manipulation that was performed today.  -She underwent bilateral intra-articular corticosteroid knee injections on January 12 of 2022 which provided significant relief of her bilateral knee pain.  -She is status post bilateral sacroiliac joint injections on November 23 of 2021 which provided significant relief of her low back pain.  -She underwent a right intra-articular knee joint injection on May 13 of 2021 which provided significant relief of her knee pain.  5.  PATIENT EDUCATION:    -I did discuss that if the corticosteroid injections into the knees failed to provide long-lasting relief, that she would be a candidate for hyaluronic acid injections into the knees.  - The patient was advised to rest today and drink plenty of water.  Normal activities can be resumed as tolerated tomorrow.    - The patient was told that soreness following the treatment is normal and should improve within a few days.  Ice should be used (as opposed to heat) if soreness occurs.  If the soreness is concerning, the clinic should be notified so further instructions can be given.  -She did bring in her occipital release tool that she recently purchased.  This has been beneficial for her and she can continue to use this  6.  FOLLOW-UP: The patient is asked to follow-up in 4 weeks. If there are any questions/concerns or any significant worsening of pain prior to that time, the patient is asked to call the clinic via the nurse navigation line or via [employer].     Subjective:     Holly MARLIN Wood (AKA Ama) is a 71 year old female who presents today for follow-up regarding chronic  bilateral low back pain secondary to sacroiliac joint pain.  Reports that this pain remains largely manageable with osteopathic manipulation.  She is feeling better after her knee injections that she had on January 12 of 2022.  She says that she still has some pain with going up and down stairs, but overall this is better.  She also notices it if she twists or pivots, that she can have some increased pain in the knee, but again overall this is better.  She has been doing quite a bit of cleaning which she thinks may have aggravated both areas of pain.  She rates her pain today at a 5 out of 10.  At worst is a 9 out of 10.  Best is a 2 out of 10.  Her pain is worse with cleaning, standing, walking.  She does feel better with taking her medications as well as doing her exercises.  She denies any new symptoms.  She has completed physical therapy and is doing exercises on her own at home.  No changes to her medications in the interim.    Past medical history is reviewed and is unchanged in the interim.    Family history is reviewed and is unchanged in the interim.    Review of Systems:  Pertinent negatives include no numbness, tingling, weakness, headaches, fevers, chills, unexplained weight loss, bowel incontinence, bladder incontinence, trips, stumbles, falls.  All others reviewed and are negative.     Objective:   CONSTITUTIONAL:  Vital signs as above.  No acute distress.  The patient is well nourished and well groomed.    PSYCHIATRIC:  The patient is awake, alert, oriented to person, place and time.  The patient is answering questions appropriately with clear speech.  Normal affect.  SKIN:  Skin over the face, posterior torso, bilateral upper and lower extremities is clean, dry, intact without rashes.  MUSCULOSKELETAL:  Gait is non-antalgic.  The patient is able to transfer independently.      OSTEOPATHIC STRUCTURAL EXAM:  Positive standing flexion test on the left.  Left superior iliac crest.  Lumbar spine:  Hypertonic left lumbar paraspinal muscles compared to the right  Sacrum: Right unilateral sacral flexion  Innominates/Pelvis: Left up slipped innominate, posterior/superior left  Thoracic spine: T1 flexed, rotated/side bent right  Rib cage: First rib elevated on the left  Cervical spine: C2-4 flexed, rotated/side bent right  Head/OA joint: Side bent right/rotated left    OMT:  TREATMENTS IN PARENTHESES  Lumbar spine: Hypertonic left lumbar paraspinal muscles compared to the right (Myofascial release, patient prone).  Sacrum: Right unilateral sacral flexion (Myofascial release, patient prone).  Innominates/Pelvis: Left up slipped innominate (Still technique, patient prone)., posterior/superior left  (Muscle energy with the patient supine).  Thoracic spine: T1 flexed, rotated/side bent right (Myofascial release, patient supine)  Rib cage: First rib elevated on the left (Myofascial release, patient supine)  Cervical spine: C2-4 flexed, rotated/side bent right  (Muscle energy with the patient supine).  Head/OA joint: Side bent right/rotated left (Myofascial release, patient supine)        Again, thank you for allowing me to participate in the care of your patient.        Sincerely,        Sylvia Young DO

## 2022-02-08 NOTE — PATIENT INSTRUCTIONS
Ama,    I'm sure everything will go great with your cataract surgeries.  Please make sure to find out your restrictions and then please let me know what they are when you come in next to see me.  I am happy to see you in about 4 weeks or whenever works with your schedule around these two cataract surgeries.      Please rest today and drink plenty of water.      It is normal to have soreness following the treatment.  Please use ice over the sore areas.  You may take your usual pain medications.  Please call the clinic at [phone] or contact me via [email or web address] if the soreness is concerning to you.      Please follow-up in approximately 4 weeks.    Thanks, Ama!  Dr. Gao

## 2022-03-29 ENCOUNTER — LAB REQUISITION (OUTPATIENT)
Dept: LAB | Facility: CLINIC | Age: 72
End: 2022-03-29

## 2022-03-29 DIAGNOSIS — Z13.220 ENCOUNTER FOR SCREENING FOR LIPOID DISORDERS: ICD-10-CM

## 2022-03-29 DIAGNOSIS — I10 ESSENTIAL (PRIMARY) HYPERTENSION: ICD-10-CM

## 2022-03-29 LAB
ALBUMIN SERPL-MCNC: 4 G/DL (ref 3.5–5)
ALP SERPL-CCNC: 108 U/L (ref 45–120)
ALT SERPL W P-5'-P-CCNC: 22 U/L (ref 0–45)
ANION GAP SERPL CALCULATED.3IONS-SCNC: 12 MMOL/L (ref 5–18)
AST SERPL W P-5'-P-CCNC: 21 U/L (ref 0–40)
BILIRUB SERPL-MCNC: 0.4 MG/DL (ref 0–1)
BUN SERPL-MCNC: 14 MG/DL (ref 8–28)
CALCIUM SERPL-MCNC: 10 MG/DL (ref 8.5–10.5)
CHLORIDE BLD-SCNC: 105 MMOL/L (ref 98–107)
CHOLEST SERPL-MCNC: 212 MG/DL
CO2 SERPL-SCNC: 25 MMOL/L (ref 22–31)
CREAT SERPL-MCNC: 0.73 MG/DL (ref 0.6–1.1)
GFR SERPL CREATININE-BSD FRML MDRD: 86 ML/MIN/1.73M2
GLUCOSE BLD-MCNC: 96 MG/DL (ref 70–125)
HDLC SERPL-MCNC: 54 MG/DL
LDLC SERPL CALC-MCNC: 125 MG/DL
MAGNESIUM SERPL-MCNC: 2.3 MG/DL (ref 1.8–2.6)
POTASSIUM BLD-SCNC: 4.5 MMOL/L (ref 3.5–5)
PROT SERPL-MCNC: 7.2 G/DL (ref 6–8)
SODIUM SERPL-SCNC: 142 MMOL/L (ref 136–145)
TRIGL SERPL-MCNC: 163 MG/DL

## 2022-03-29 PROCEDURE — 80061 LIPID PANEL: CPT | Performed by: NURSE PRACTITIONER

## 2022-03-29 PROCEDURE — 83735 ASSAY OF MAGNESIUM: CPT | Performed by: NURSE PRACTITIONER

## 2022-03-29 PROCEDURE — 80053 COMPREHEN METABOLIC PANEL: CPT | Performed by: NURSE PRACTITIONER

## 2022-03-29 PROCEDURE — 82306 VITAMIN D 25 HYDROXY: CPT | Performed by: NURSE PRACTITIONER

## 2022-03-30 LAB — DEPRECATED CALCIDIOL+CALCIFEROL SERPL-MC: 47 UG/L (ref 20–75)

## 2022-04-05 ENCOUNTER — OFFICE VISIT (OUTPATIENT)
Dept: PHYSICAL MEDICINE AND REHAB | Facility: CLINIC | Age: 72
End: 2022-04-05
Payer: COMMERCIAL

## 2022-04-05 VITALS — HEART RATE: 88 BPM | DIASTOLIC BLOOD PRESSURE: 93 MMHG | SYSTOLIC BLOOD PRESSURE: 158 MMHG

## 2022-04-05 DIAGNOSIS — M99.02 THORACIC REGION SOMATIC DYSFUNCTION: ICD-10-CM

## 2022-04-05 DIAGNOSIS — M99.05 SOMATIC DYSFUNCTION OF PELVIS REGION: ICD-10-CM

## 2022-04-05 DIAGNOSIS — M99.03 LUMBAR REGION SOMATIC DYSFUNCTION: ICD-10-CM

## 2022-04-05 DIAGNOSIS — G89.29 CHRONIC BILATERAL LOW BACK PAIN WITHOUT SCIATICA: Primary | ICD-10-CM

## 2022-04-05 DIAGNOSIS — M99.01 CERVICAL SOMATIC DYSFUNCTION: ICD-10-CM

## 2022-04-05 DIAGNOSIS — M54.50 CHRONIC BILATERAL LOW BACK PAIN WITHOUT SCIATICA: Primary | ICD-10-CM

## 2022-04-05 DIAGNOSIS — M99.00 HEAD REGION SOMATIC DYSFUNCTION: ICD-10-CM

## 2022-04-05 DIAGNOSIS — M99.04 SACRAL REGION SOMATIC DYSFUNCTION: ICD-10-CM

## 2022-04-05 DIAGNOSIS — M99.08 RIB CAGE REGION SOMATIC DYSFUNCTION: ICD-10-CM

## 2022-04-05 DIAGNOSIS — M25.561 BILATERAL CHRONIC KNEE PAIN: ICD-10-CM

## 2022-04-05 DIAGNOSIS — M17.0 PRIMARY OSTEOARTHRITIS OF BOTH KNEES: ICD-10-CM

## 2022-04-05 DIAGNOSIS — G89.29 BILATERAL CHRONIC KNEE PAIN: ICD-10-CM

## 2022-04-05 DIAGNOSIS — M25.562 BILATERAL CHRONIC KNEE PAIN: ICD-10-CM

## 2022-04-05 PROCEDURE — 99214 OFFICE O/P EST MOD 30 MIN: CPT | Mod: 25 | Performed by: PHYSICAL MEDICINE & REHABILITATION

## 2022-04-05 PROCEDURE — 98928 OSTEOPATH MANJ 7-8 REGIONS: CPT | Performed by: PHYSICAL MEDICINE & REHABILITATION

## 2022-04-05 RX ORDER — LISINOPRIL 10 MG/1
10 TABLET ORAL DAILY
COMMUNITY
Start: 2022-03-29 | End: 2024-01-09 | Stop reason: ALTCHOICE

## 2022-04-05 ASSESSMENT — PAIN SCALES - GENERAL: PAINLEVEL: MODERATE PAIN (5)

## 2022-04-05 NOTE — LETTER
4/5/2022         RE: Holly Wood  [address] [facility] [address]        Dear Colleague,    Thank you for referring your patient, Holly Wood, to the [facility]. Please see a copy of my visit note below.    Assessment:   Holly Wood (AKA Ama) is a 72 year old y.o. female with past medical history significant for macular degeneration who presents today for follow-up regarding chronic bilateral low back pain without radicular/sciatic type leg symptoms secondary to sacroiliac joint pain.  This pain remains largely manageable with osteopathic manipulation, with her last treatment being on February 8 of 2022.  Her main concern today is return of worsening of bilateral knee pain secondary to primary osteoarthritis.  Reports that she had good relief with the bilateral intra-articular corticosteroid knee joint injections on January 12 of 2022, but the relief only lasted for about 6 weeks and her knee pain has been progressively worsening.She continues to have multiple areas of osteopathic somatic dysfunction which are listed below.  Remains without any red flag symptoms.    PSP:  Dr. Gao        Plan:     A shared decision making plan was used.  The patient's values and choices were respected.  The following represents what was discussed and decided upon by the physician and the patient.      1.  DIAGNOSTIC TESTS:  No further diagnostic tests are necessary at this time.  She does not have any radicular or red flag symptoms that would necessitate an MRI of the low back.  -She did have x-rays of her bilateral knees on May 8 of 2019 which do show mild osteoarthritis.  Updated imaging of the knees is not necessary at this time.  2.  PHYSICAL THERAPY: The patient has previously completed physical therapy in the fall 2020 for both the low back and the knees, but she states that she does not remember any specific exercises for the knees.  I did offer to have her  return to physical therapy to focus specifically on the knees for 2-4 visits.  She would like to do this so an order was provided to the [facility] rehab.  She is encouraged to continue doing the home exercise program daily.  She should bring in her handouts to her physical therapy appointments with a physical therapist can review and modify any exercises that she is currently doing  3.  MEDICATIONS:  No changes to the medications today.  -She can continue to take gabapentin 300 mg at bedtime.  A PDMP check was run today and she is deemed more risk and appropriate for continued gabapentin use  -She can continue to take Elavil 25 mg at bedtime.  -She can continue to take over-the-counter aspirin.  4.  INTERVENTIONS:  The patient is considered appropriate for continued osteopathic manipulation.  Osteopathic manipulation was performed to 7 areas today.  The patient tolerated the treatment well and she reported relief immediately afterwards..   Please see below for the osteopathic manipulation that was performed today.  -I did offer her bilateral Synvisc injections (hyaluronic acid) to the bilateral knees under ultrasound guidance.  We will obtain insurance authorization before scheduling her for these.  I did explain that this is a series of 3 injections, all scheduled 1 week apart.  She verbalized understanding.  If she does not hear from me within 2 weeks, she should contact me so I can check the status of the authorization  -She is status post bilateral intra-articular corticosteroid knee injections on January 12 of 2022 which has provided significant relief of her bilateral knee pain.  -She is status post bilateral sacroiliac joint injections on November 23 of 2021 which provided significant relief of her low back pain.  -She underwent a right intra-articular knee joint injection on May 13 of 2021 which provided significant relief of her right knee pain.  5.  PATIENT EDUCATION:    - The patient was advised  to rest today and drink plenty of water.  Normal activities can be resumed as tolerated tomorrow.    - The patient was told that soreness following the treatment is normal and should improve within a few days.  Ice should be used (as opposed to heat) if soreness occurs.  If the soreness is concerning, the clinic should be notified so further instructions can be given.  -I did explain the importance of trying to maintain the strength of her knees.  This will hopefully help the Synvisc injections last as long as possible.  If she does need to undergo a knee replacement surgery, being as strong as possible before hand will help with her recovery from that type of surgery  6.  FOLLOW-UP: We will contact her to schedule the Synvisc injections after we obtain authorization.  The patient is asked to follow-up with me in 4 weeks for reevaluation. If there are any questions/concerns or any significant worsening of pain prior to that time, the patient is asked to call the clinic via the nurse navigation line or via [employer].     Subjective:     Holly ISAAC Israel (AKA Ama) is a 72 year old female who presents today for follow-up regarding chronic bilateral low back pain without radicular/sciatic type leg symptoms.  Reports that her low back pain remains largely manageable with osteopathic manipulation and her medications.  Her main concern today is that she has had worsening of her knee pain.  Reports that she did get good relief with her bilateral knee joint injections on January 12 of 2022.  However the relief only lasted about 6 weeks and then her pain has been progressively worsening since that time.  She is wondering if there is something else that can be done will give her longer lasting relief.  She is interested in potentially hyaluronic acid injections, which were briefly mentioned at the last visit.  She is rating her pain today at a 5 out of 10.  At worst it is a 9 out of 10.  At best is a 2 out of 10.  Her pain is  worse with standing, walking, lifting.  She does feel better with medication, rest, injections.  She denies any new symptoms.  She has not done any physical therapy recently, but states that she is pretty diligent in doing a home exercise program on a regular basis.  She is taking aspirin and gabapentin which she does find helpful for managing her overall pain.    She does state that she recently underwent cataract surgery.  Procedure went really well.  She states that she has been diagnosed with high blood pressure and recently started lisinopril.  She is continuing to follow with her primary care physician for management of this medication    Family history is reviewed and is unchanged in the interim.    Review of Systems: Positive for pain that makes it difficult to sleep at night..  Pertinent negatives include no numbness, tingling, weakness, headaches, fevers, chills, unexplained weight loss, bowel incontinence, bladder incontinence, trips, stumbles, falls.  All others reviewed and are negative.     Objective:   CONSTITUTIONAL:  Vital signs as above.  No acute distress.  The patient is well nourished and well groomed.    PSYCHIATRIC:  The patient is awake, alert, oriented to person, place and time.  The patient is answering questions appropriately with clear speech.  Normal affect.  SKIN:  Skin over the face, posterior torso, bilateral upper and lower extremities is clean, dry, intact without rashes.  MUSCULOSKELETAL:  Gait is non-antalgic.  The patient is able to transfer independently.      OSTEOPATHIC STRUCTURAL EXAM:  Positive standing flexion test on the left.  Left superior iliac crest.  Lumbar spine: L5 flexed, rotated/side bent  Sacrum: Left on left forward sacral torsion  Innominates/Pelvis: Left up slipped innominate.  Posterior/superior left.  Thoracic spine: T1 flexed, rotated/side bent right  Rib cage: First rib elevated on the left  Cervical spine: C2-3 flexed, rotated/side bent right  Head/OA  joint: Side bent right/rotated left    OMT:  TREATMENTS IN PARENTHESES  Lumbar spine: L5 flexed, rotated/side bent  (Muscle energy, patient in lateral recumbent)  Sacrum: Left on left forward sacral torsion  (Muscle energy, patient in lateral recumbent)  Innominates/Pelvis: Left up slipped innominate (Still technique, patient prone)..  Posterior/superior left.  (Muscle energy with the patient supine).  Thoracic spine: T1 flexed, rotated/side bent right (Myofascial release, patient supine)  Rib cage: First rib elevated on the left (Myofascial release, patient supine)  Cervical spine: C2-3 flexed, rotated/side bent right  (Muscle energy with the patient supine).  Head/OA joint: Side bent right/rotated left  (Muscle energy with the patient supine).        Again, thank you for allowing me to participate in the care of your patient.        Sincerely,        Sylvia Young, DO

## 2022-04-05 NOTE — PATIENT INSTRUCTIONS
Ama    An order for physical therapy has been provided today to refresh the exercises for your knees.  Someone will call you to schedule physical therapy or you can call [phone] to schedule physical therapy.  It will be very important for you to do your physical therapy exercises on a regular basis to decrease your pain and prevent future flares of pain.      You were treated with Osteopathic Manipulative Medicine (OMM) today.    Please rest today and drink plenty of water.   It is okay to do light activities but please do not do any intensive exercises/activities.    It is normal to have soreness following the treatment.  Please use ice over the sore areas.  You may take your usual pain medications.  Please call the clinic at [phone] or contact me (Dr. Gao) via [employer] if the soreness is concerning to you.      Please follow-up with me (Dr. Gao) in 4 weeks for more OMT.      I will order Synvisc injections for your knees to be done under ultrasound guidance with Dr. Beltre.  We need to get the prior authorization for this type of medication before we schedule you for the injection.  I will contact you once we have the prior authorization and then you can schedule.  If you have not heard from me within 2 weeks, please send me a [employer] message or the call the clinic at [phone] so I can check the status.    Thanks, Ama!  Dr. Gao

## 2022-04-05 NOTE — PROGRESS NOTES
Assessment:   Holly Wood (AKA Ama) is a 72 year old y.o. female with past medical history significant for macular degeneration who presents today for follow-up regarding chronic bilateral low back pain without radicular/sciatic type leg symptoms secondary to sacroiliac joint pain.  This pain remains largely manageable with osteopathic manipulation, with her last treatment being on February 8 of 2022.  Her main concern today is return of worsening of bilateral knee pain secondary to primary osteoarthritis.  Reports that she had good relief with the bilateral intra-articular corticosteroid knee joint injections on January 12 of 2022, but the relief only lasted for about 6 weeks and her knee pain has been progressively worsening.She continues to have multiple areas of osteopathic somatic dysfunction which are listed below.  Remains without any red flag symptoms.    PSP:  Dr. Gao        Plan:     A shared decision making plan was used.  The patient's values and choices were respected.  The following represents what was discussed and decided upon by the physician and the patient.      1.  DIAGNOSTIC TESTS:  No further diagnostic tests are necessary at this time.  She does not have any radicular or red flag symptoms that would necessitate an MRI of the low back.  -She did have x-rays of her bilateral knees on May 8 of 2019 which do show mild osteoarthritis.  Updated imaging of the knees is not necessary at this time.  2.  PHYSICAL THERAPY: The patient has previously completed physical therapy in the fall 2020 for both the low back and the knees, but she states that she does not remember any specific exercises for the knees.  I did offer to have her return to physical therapy to focus specifically on the knees for 2-4 visits.  She would like to do this so an order was provided to the [facility] rehab.  She is encouraged to continue doing the home exercise program daily.  She should bring in her handouts  to her physical therapy appointments with a physical therapist can review and modify any exercises that she is currently doing  3.  MEDICATIONS:  No changes to the medications today.  -She can continue to take gabapentin 300 mg at bedtime.  A PDMP check was run today and she is deemed more risk and appropriate for continued gabapentin use  -She can continue to take Elavil 25 mg at bedtime.  -She can continue to take over-the-counter aspirin.  4.  INTERVENTIONS:  The patient is considered appropriate for continued osteopathic manipulation.  Osteopathic manipulation was performed to 7 areas today.  The patient tolerated the treatment well and she reported relief immediately afterwards..   Please see below for the osteopathic manipulation that was performed today.  -I did offer her bilateral Synvisc injections (hyaluronic acid) to the bilateral knees under ultrasound guidance.  We will obtain insurance authorization before scheduling her for these.  I did explain that this is a series of 3 injections, all scheduled 1 week apart.  She verbalized understanding.  If she does not hear from me within 2 weeks, she should contact me so I can check the status of the authorization  -She is status post bilateral intra-articular corticosteroid knee injections on January 12 of 2022 which has provided significant relief of her bilateral knee pain.  -She is status post bilateral sacroiliac joint injections on November 23 of 2021 which provided significant relief of her low back pain.  -She underwent a right intra-articular knee joint injection on May 13 of 2021 which provided significant relief of her right knee pain.  5.  PATIENT EDUCATION:    - The patient was advised to rest today and drink plenty of water.  Normal activities can be resumed as tolerated tomorrow.    - The patient was told that soreness following the treatment is normal and should improve within a few days.  Ice should be used (as opposed to heat) if soreness  occurs.  If the soreness is concerning, the clinic should be notified so further instructions can be given.  -I did explain the importance of trying to maintain the strength of her knees.  This will hopefully help the Synvisc injections last as long as possible.  If she does need to undergo a knee replacement surgery, being as strong as possible before hand will help with her recovery from that type of surgery  6.  FOLLOW-UP: We will contact her to schedule the Synvisc injections after we obtain authorization.  The patient is asked to follow-up with me in 4 weeks for reevaluation. If there are any questions/concerns or any significant worsening of pain prior to that time, the patient is asked to call the clinic via the nurse navigation line or via [place]     Subjective:     Holly Wood (AKA Ama) is a 72 year old female who presents today for follow-up regarding chronic bilateral low back pain without radicular/sciatic type leg symptoms.  Reports that her low back pain remains largely manageable with osteopathic manipulation and her medications.  Her main concern today is that she has had worsening of her knee pain.  Reports that she did get good relief with her bilateral knee joint injections on January 12 of 2022.  However the relief only lasted about 6 weeks and then her pain has been progressively worsening since that time.  She is wondering if there is something else that can be done will give her longer lasting relief.  She is interested in potentially hyaluronic acid injections, which were briefly mentioned at the last visit.  She is rating her pain today at a 5 out of 10.  At worst it is a 9 out of 10.  At best is a 2 out of 10.  Her pain is worse with standing, walking, lifting.  She does feel better with medication, rest, injections.  She denies any new symptoms.  She has not done any physical therapy recently, but states that she is pretty diligent in doing a home exercise program on a regular  basis.  She is taking aspirin and gabapentin which she does find helpful for managing her overall pain.    She does state that she recently underwent cataract surgery.  Procedure went really well.  She states that she has been diagnosed with high blood pressure and recently started lisinopril.  She is continuing to follow with her primary care physician for management of this medication    Family history is reviewed and is unchanged in the interim.    Review of Systems: Positive for pain that makes it difficult to sleep at night..  Pertinent negatives include no numbness, tingling, weakness, headaches, fevers, chills, unexplained weight loss, bowel incontinence, bladder incontinence, trips, stumbles, falls.  All others reviewed and are negative.     Objective:   CONSTITUTIONAL:  Vital signs as above.  No acute distress.  The patient is well nourished and well groomed.    PSYCHIATRIC:  The patient is awake, alert, oriented to person, place and time.  The patient is answering questions appropriately with clear speech.  Normal affect.  SKIN:  Skin over the face, posterior torso, bilateral upper and lower extremities is clean, dry, intact without rashes.  MUSCULOSKELETAL:  Gait is non-antalgic.  The patient is able to transfer independently.      OSTEOPATHIC STRUCTURAL EXAM:  Positive standing flexion test on the left.  Left superior iliac crest.  Lumbar spine: L5 flexed, rotated/side bent  Sacrum: Left on left forward sacral torsion  Innominates/Pelvis: Left up slipped innominate.  Posterior/superior left.  Thoracic spine: T1 flexed, rotated/side bent right  Rib cage: First rib elevated on the left  Cervical spine: C2-3 flexed, rotated/side bent right  Head/OA joint: Side bent right/rotated left    OMT:  TREATMENTS IN PARENTHESES  Lumbar spine: L5 flexed, rotated/side bent  (Muscle energy, patient in lateral recumbent)  Sacrum: Left on left forward sacral torsion  (Muscle energy, patient in lateral  recumbent)  Innominates/Pelvis: Left up slipped innominate (Still technique, patient prone)..  Posterior/superior left.  (Muscle energy with the patient supine).  Thoracic spine: T1 flexed, rotated/side bent right (Myofascial release, patient supine)  Rib cage: First rib elevated on the left (Myofascial release, patient supine)  Cervical spine: C2-3 flexed, rotated/side bent right  (Muscle energy with the patient supine).  Head/OA joint: Side bent right/rotated left  (Muscle energy with the patient supine).

## 2022-04-07 ENCOUNTER — TELEPHONE (OUTPATIENT)
Dept: PHYSICAL MEDICINE AND REHAB | Facility: CLINIC | Age: 72
End: 2022-04-07
Payer: COMMERCIAL

## 2022-04-07 NOTE — TELEPHONE ENCOUNTER
PSP:  Dr. Gao   Last clinic visit:  4/5/22  Reason for call: Wanted to ask if there is any problems with having the Synvisc injections and going to PT between the injections  Clinical information:  Starts PT on 4/27/22 (earliest available) and scheduled for the series on 4/25, 5/2, and 5/9.  Advice given to patient: I did state I wasn't aware that patients should limit activity with the injections. She asks as the schedulers for PT asked about it. Explained I would pose her question to both Adela Gao and Alexsander and call her back with response. OK to leave a detailed message on call back.  Provider to address: Synvisc injections and PT ok simultaneously

## 2022-04-11 NOTE — TELEPHONE ENCOUNTER
Phone call to patient to inform her that Dr. Beltre does not see a problem with patient moving through PT while receiving her knee injections. Left detailed message on dedicated voicemail. Encouraged pt to call nurse navigation line if questions or concerns arise.

## 2022-04-27 ENCOUNTER — HOSPITAL ENCOUNTER (OUTPATIENT)
Dept: PHYSICAL THERAPY | Facility: REHABILITATION | Age: 72
Discharge: HOME | End: 2022-04-27
Attending: PHYSICAL MEDICINE & REHABILITATION
Payer: COMMERCIAL

## 2022-04-27 DIAGNOSIS — M17.0 PRIMARY OSTEOARTHRITIS OF BOTH KNEES: ICD-10-CM

## 2022-04-27 PROCEDURE — 97110 THERAPEUTIC EXERCISES: CPT | Mod: GP

## 2022-04-27 PROCEDURE — 97161 PT EVAL LOW COMPLEX 20 MIN: CPT | Mod: GP

## 2022-04-29 NOTE — PROGRESS NOTES
[facility]    OUTPATIENT PHYSICAL THERAPY ORTHOPEDIC EVALUATION  PLAN OF TREATMENT FOR OUTPATIENT REHABILITATION  (COMPLETE FOR INITIAL CLAIMS ONLY)  Patient's Last Name, First Name, M.I.  YOB: 1950  Holly Wood    Provider s Name:  [facility]   Medical Record No.  [id number]   Start of Care Date:  04/27/22   Onset Date:  04/05/22   Type:     _X__PT   ___OT   ___SLP Medical Diagnosis:  Primary osteoarthritis of both knees     PT Diagnosis:  Bilateral knee pain   Visits from SOC:  1      _________________________________________________________________________________  Plan of Treatment/Functional Goals:  balance training, gait training, joint mobilization, manual therapy, neuromuscular re-education, ROM, strengthening, stretching     Cryotherapy, Electrical stimulation, Hot packs, TENS     Goals  Goal Identifier: HEP  Goal Description: Pt will be independent with HEP to improve mobility and decrease pain.  Target Date: 07/25/22    Goal Identifier: Standing  Goal Description: Pt will report ability to stand longer than 1 hour with less than 1/10 pain to improve functional mobility and increase tolerance completing ADL's and recreational activities.  Target Date: 07/25/22    Goal Identifier: Walking  Goal Description: Pt will report ability to walk longer than 10 minutes with less than 1/10 pain to improve functional mobility and improve quality of life.  Target Date: 07/25/22    Goal Identifier: Stairs  Goal Description: Pt will report ability to go down 1 flight of stairs with less than 1/10 pain to improve functional mobility and increase tolerance completing ADL's and recreational activities.  Target Date: 07/25/22                                                Therapy Frequency:  1 time/week  Predicted Duration of Therapy Intervention:  1 time a week for  minimum of 4 weeks, minimum of 4 sessions    Harmony James, PT                 I CERTIFY THE NEED FOR THESE SERVICES FURNISHED UNDER        THIS PLAN OF TREATMENT AND WHILE UNDER MY CARE     (Physician co-signature of this document indicates review and certification of the therapy plan).                     Certification Date From:  04/27/22   Certification Date To:  07/25/22    Referring Provider:  Sylvia Young, DO    Initial Assessment        See Epic Evaluation Start of Care Date: 04/27/22 04/27/22 1318   General Information   Type of Visit Initial OP Ortho PT Evaluation   Start of Care Date 04/27/22   Referring Physician Sylvia Young, DO   Patient/Family Goals Statement relief of knee pain   Orders Evaluate and Treat   Date of Order 04/05/22   Certification Required? Yes   Medical Diagnosis Primary osteoarthritis of both knees   Surgical/Medical history reviewed Yes   Precautions/Limitations no known precautions/limitations   Weight-Bearing Status - LUE full weight-bearing   Weight-Bearing Status - RUE full weight-bearing   Weight-Bearing Status - LLE full weight-bearing   Weight-Bearing Status - RLE full weight-bearing   General Information Comments Pt reports that she is overall healthy. Pt reports PMH of high blood pressure and PSH of L toe surgery.   Body Part(s)   Body Part(s) Knee   Presentation and Etiology   Pertinent history of current problem (include personal factors and/or comorbidities that impact the POC) Pt reports that she has had knee pain for about 20 years. Pt reports no specific incident or injury that caused bilateral knee pain, but that it was more than likely wear and tear over the years. Pt reports that she gets burning pain in front of both knees, right on her knee cap, when sitting. Pt also reports that the pain goes around her knees to the back of her knees. Pt reports that sometimes she gers sharp pain in her knees. Pt reports that  she has gotten injections in the past and is planning on getting bilateral knee injections next Monday. Pt reports that due to her bilateral knee pain that she doesn't walk well and that she something uses trekking poles when walking for more than 5 minutes. Pt reports that sometimes she also gets buzzing sensation in the back of her right leg at night.   Impairments A. Pain;D. Decreased ROM;E. Decreased flexibility;F. Decreased strength and endurance;G. Impaired balance;H. Impaired gait;J. Burning;L. Tingling   Functional Limitations perform activities of daily living;perform desired leisure / sports activities   Symptom Location Bilateral knees   How/Where did it occur From insidious onset   Onset date of current episode/exacerbation 04/05/22   Chronicity Chronic   Pain rating (0-10 point scale) Best (/10);Worst (/10)  (Current: 2-3/10)   Best (/10) 2-3/10, never completely gone   Worst (/10) 8/10   Pain quality D. Burning;A. Sharp  (buzzing in R leg,)   Frequency of pain/symptoms A. Constant   Pain/symptoms are: Worse during the night   Pain/symptoms exacerbated by A. Sitting;B. Walking  (standing, stairs, waling for more than 5-10 min)   Pain/symptoms eased by C. Rest;E. Changing positions;I. OTC medication(s);G. Heat;H. Cold  (ice/heat in past, aspirin, gabapentin)   Progression of symptoms since onset: Worsened  (gotten worse in past 6-8 months)   Prior Level of Function   Prior Level of Function-Mobility Independent   Prior Level of Function-ADLs Independent   Functional Level Prior Comment Independent   Current Level of Function   Patient role/employment history F. Retired   Current equipment-Gait/Locomotion   (Trekking poles/sticks)   Fall Risk Screen   Fall screen completed by PT   Have you fallen 2 or more times in the past year? No   Have you fallen and had an injury in the past year? No   Is patient a fall risk? No   Abuse Screen (yes response referral indicated)   Feels Unsafe at Home or Work/School no    Feels Threatened by Someone no   Does Anyone Try to Keep You From Having Contact with Others or Doing Things Outside Your Home? no   Physical Signs of Abuse Present no   System Outcome Measures   Outcome Measures   (LEFS: 19/80)   Knee Objective Findings   Gait/Locomotion No deviations noted   Knee/Hip Strength Comments B Hip flexion: 4/5, B DF: 4+/5   Posture Normal   Anterior Drawer Test Negative   Posterior Drawer Test Negative   Varus Stress Test Negative   Valgus Stress Test Postive on L   Side (if bilateral, select both right and left) Right;Left   Right Knee Extension AROM WFL   Right Knee Extension PROM WFL   Left Knee Extension AROM WFL   Left Knee Extension PROM WFL   Right Knee Flexion AROM WFL   Right Knee Flexion PROM WFL   Left Knee Flexion AROM WFL   Left Knee Flexion PROM WFL   Right Knee Flexion Strength 3+/5   Left Knee Flexion Strength 3+/5   Right Knee Extension Strength 4/5   Left Knee Extension Strength 4-/5   Right Hamstring Flexibility Decreased   Left Hamstring Flexibility Decreased   Right Hip Flexor Flexibility Decreased   Left Hip Flexor Flexibility Decreased   Right Quadricep Flexibility Decreased   Left Quadricep Flexibility Decreased   Planned Therapy Interventions   Planned Therapy Interventions balance training;gait training;joint mobilization;manual therapy;neuromuscular re-education;ROM;strengthening;stretching   Planned Modality Interventions   Planned Modality Interventions Cryotherapy;Electrical stimulation;Hot packs;TENS   Clinical Impression   Criteria for Skilled Therapeutic Interventions Met yes, treatment indicated   PT Diagnosis Bilateral knee pain   Influenced by the following impairments Decreased BLE strength, decreased flexibility, positive valgus stress on L, pain   Functional limitations due to impairments Impaired functional mobility, decreased tolerance completing ADL's and recreational activities   Clinical Presentation Stable/Uncomplicated   Clinical Decision  Making (Complexity) Low complexity   Therapy Frequency 1 time/week   Predicted Duration of Therapy Intervention (days/wks) 1 time a week for minimum of 4 weeks, minimum of 4 sessions   Risk & Benefits of therapy have been explained Yes   Patient, Family & other staff in agreement with plan of care Yes   Clinical Impression Comments Pt is a 72 year old female who presents to physical therapy with reports of bilateral knee pain. Pt has decreased BLE strength and decreased BLE flexibility. Pt has positive valgus stress on L. Pt scored 19/80 on LEFS wtih reports of extreme difficulty squatting, walking more than 1 mile, standing for more than 1 hour, running and hopping. Pt would benefit from physical therapy to increase strength, increase flexibility, and decrease pain to improve functional mobility and increase tolerance completing ADL's and recreational activities.   ORTHO GOALS   PT Ortho Eval Goals 1;2;3;4   Ortho Goal 1   Goal Identifier HEP   Goal Description Pt will be independent with HEP to improve mobility and decrease pain.   Target Date 07/25/22   Ortho Goal 2   Goal Identifier Standing   Goal Description Pt will report ability to stand longer than 1 hour with less than 1/10 pain to improve functional mobility and increase tolerance completing ADL's and recreational activities.   Target Date 07/25/22   Ortho Goal 3   Goal Identifier Walking   Goal Description Pt will report ability to walk longer than 10 minutes with less than 1/10 pain to improve functional mobility and improve quality of life.   Target Date 07/25/22   Ortho Goal 4   Goal Identifier Stairs   Goal Description Pt will report ability to go down 1 flight of stairs with less than 1/10 pain to improve functional mobility and increase tolerance completing ADL's and recreational activities.   Target Date 07/25/22   Total Evaluation Time   PT Eval, Low Complexity Minutes (64962) 20   Therapy Certification   Certification date from 04/27/22    Certification date to 07/25/22   Medical Diagnosis Primary osteoarthritis of both knees     Harmony James, PT, DPT

## 2022-05-02 ENCOUNTER — RADIOLOGY INJECTION OFFICE VISIT (OUTPATIENT)
Dept: PHYSICAL MEDICINE AND REHAB | Facility: CLINIC | Age: 72
End: 2022-05-02
Attending: PHYSICAL MEDICINE & REHABILITATION
Payer: COMMERCIAL

## 2022-05-02 VITALS
OXYGEN SATURATION: 98 % | DIASTOLIC BLOOD PRESSURE: 86 MMHG | SYSTOLIC BLOOD PRESSURE: 156 MMHG | HEART RATE: 82 BPM | TEMPERATURE: 97.7 F

## 2022-05-02 DIAGNOSIS — M17.0 PRIMARY OSTEOARTHRITIS OF BOTH KNEES: ICD-10-CM

## 2022-05-02 DIAGNOSIS — M25.561 BILATERAL CHRONIC KNEE PAIN: ICD-10-CM

## 2022-05-02 DIAGNOSIS — G89.29 BILATERAL CHRONIC KNEE PAIN: ICD-10-CM

## 2022-05-02 DIAGNOSIS — M25.562 BILATERAL CHRONIC KNEE PAIN: ICD-10-CM

## 2022-05-02 PROCEDURE — 20611 DRAIN/INJ JOINT/BURSA W/US: CPT | Mod: 50 | Performed by: PAIN MEDICINE

## 2022-05-02 RX ORDER — HYALURONATE SODIUM 10 MG/ML
SYRINGE (ML) INTRAARTICULAR
Status: COMPLETED | OUTPATIENT
Start: 2022-05-02 | End: 2022-05-02

## 2022-05-02 RX ORDER — LIDOCAINE HYDROCHLORIDE 10 MG/ML
INJECTION, SOLUTION EPIDURAL; INFILTRATION; INTRACAUDAL; PERINEURAL
Status: COMPLETED | OUTPATIENT
Start: 2022-05-02 | End: 2022-05-02

## 2022-05-02 RX ADMIN — Medication 20 MG: at 15:11

## 2022-05-02 RX ADMIN — Medication 20 MG: at 15:10

## 2022-05-02 RX ADMIN — LIDOCAINE HYDROCHLORIDE 2 ML: 10 INJECTION, SOLUTION EPIDURAL; INFILTRATION; INTRACAUDAL; PERINEURAL at 15:09

## 2022-05-02 ASSESSMENT — PAIN SCALES - GENERAL
PAINLEVEL: SEVERE PAIN (6)
PAINLEVEL: SEVERE PAIN (6)

## 2022-05-02 NOTE — PATIENT INSTRUCTIONS
DISCHARGE INSTRUCTIONS    During office hours (8:00 a.m.- 4:00 p.m.) questions or concerns may be answered  by calling [facility] Navigation Nurses at  [phone].  Messages received after hours will be returned the following business day.      In the case of an emergency, please dial 911 or seek assistance at the nearest Emergency Room/Urgent Care facility.     All Patients:  You may experience an increase in your symptoms for the first 2 days (It may take anywhere between 2 days- 2 weeks for the steroid to have maximum effect).    You may use ice on the injection site, as frequently as 20 minutes each hour if needed.    You may take your pain medicine.    You may continue taking your regular medication.    You may shower. No swimming, tub bath or hot tub for 48 hours.  You may remove your bandaid/bandage as soon as you are home.    You may resume light activities, as tolerated.    Resume your usual diet as tolerated.    It is strongly advised that you do not drive for 1-3 hours post injection.    If you have had oral sedation:  Do not drive for 8 hours post injection.      If you have had IV sedation:  Do not drive for 24 hours post injection.  Do not operate hazardous machinery or make important personal/business decisions for 24 hours.    POSSIBLE STEROID SIDE EFFECTS (If steroid/cortisone was used for your procedure)    -If you experience these symptoms, it should only last for a short period    Swelling of the legs              Skin redness (flushing)     Mouth (oral) irritation   Blood sugar (glucose) levels            Sweats                   Mood changes  Headache  Weakened immune system for up to 14 days, which could increase the risk of james the COVID-19 virus and/or experiencing more severe symptoms of the disease, if exposed.         POSSIBLE PROCEDURE SIDE EFFECTS    -Call the [facility] Center if you are concerned    Increased Pain           Increased numbness/tingling      Nausea/Vomiting           Bruising/bleeding at site      Redness or swelling                                              Difficulty walking      Weakness          Fever greater than 100.5    *In the event of a severe headache after an epidural steroid injection that is relieved by lying down, please call the [facility] to speak with a clinical staff member*

## 2022-05-09 ENCOUNTER — RADIOLOGY INJECTION OFFICE VISIT (OUTPATIENT)
Dept: PHYSICAL MEDICINE AND REHAB | Facility: CLINIC | Age: 72
End: 2022-05-09
Attending: PHYSICAL MEDICINE & REHABILITATION
Payer: COMMERCIAL

## 2022-05-09 VITALS
TEMPERATURE: 98.2 F | DIASTOLIC BLOOD PRESSURE: 78 MMHG | SYSTOLIC BLOOD PRESSURE: 173 MMHG | HEART RATE: 86 BPM | OXYGEN SATURATION: 96 %

## 2022-05-09 DIAGNOSIS — M17.0 PRIMARY OSTEOARTHRITIS OF BOTH KNEES: ICD-10-CM

## 2022-05-09 DIAGNOSIS — M25.561 BILATERAL CHRONIC KNEE PAIN: ICD-10-CM

## 2022-05-09 DIAGNOSIS — M25.562 BILATERAL CHRONIC KNEE PAIN: ICD-10-CM

## 2022-05-09 DIAGNOSIS — G89.29 BILATERAL CHRONIC KNEE PAIN: ICD-10-CM

## 2022-05-09 PROCEDURE — 20611 DRAIN/INJ JOINT/BURSA W/US: CPT | Mod: 50 | Performed by: PAIN MEDICINE

## 2022-05-09 RX ORDER — HYALURONATE SODIUM 10 MG/ML
SYRINGE (ML) INTRAARTICULAR
Status: COMPLETED | OUTPATIENT
Start: 2022-05-09 | End: 2022-05-09

## 2022-05-09 RX ORDER — LIDOCAINE HYDROCHLORIDE 10 MG/ML
INJECTION, SOLUTION EPIDURAL; INFILTRATION; INTRACAUDAL; PERINEURAL
Status: COMPLETED | OUTPATIENT
Start: 2022-05-09 | End: 2022-05-09

## 2022-05-09 RX ADMIN — Medication 20 MG: at 14:37

## 2022-05-09 RX ADMIN — LIDOCAINE HYDROCHLORIDE 3 ML: 10 INJECTION, SOLUTION EPIDURAL; INFILTRATION; INTRACAUDAL; PERINEURAL at 14:36

## 2022-05-09 ASSESSMENT — PAIN SCALES - GENERAL: PAINLEVEL: NO PAIN (0)

## 2022-05-09 NOTE — PATIENT INSTRUCTIONS
DISCHARGE INSTRUCTIONS    During office hours (8:00 a.m.- 4:00 p.m.) questions or concerns may be answered  by calling [facility] Navigation Nurses at  [phone].  Messages received after hours will be returned the following business day.      In the case of an emergency, please dial 911 or seek assistance at the nearest Emergency Room/Urgent Care facility.     All Patients:    You may experience an increase in your symptoms for the first 2 days (It may take 2-3 weeks for the medication to have maximum effect).    You may use ice on the injection site, as frequently as 20 minutes each hour if needed.    You may take your pain medicine.    You may continue taking your regular medication after your injection.    You may shower. No swimming, tub bath or hot tub for 48 hours.  You may remove your bandaid/bandage as soon as you are home.    You may resume light activities, as tolerated.    Resume your usual diet as tolerated.      POSSIBLE EUFLEXXA SIDE EFFECTS    -If you experience these symptoms, it should only last for a short period    Swelling at injection site  Swelling of the knee joint              Bruising under the skin     Decreased appetite  Headache  Itching  Nausea  Diarrhea  Irritation to the stomach or intestines         POSSIBLE PROCEDURE SIDE EFFECTS  -Call the [facility] Center if you are concerned  Increased Pain           Increased numbness/tingling      Nausea/Vomiting          Bruising/bleeding at site      Redness or swelling                                              Difficulty walking      Weakness           Fever greater than 100.5

## 2022-05-16 ENCOUNTER — RADIOLOGY INJECTION OFFICE VISIT (OUTPATIENT)
Dept: PHYSICAL MEDICINE AND REHAB | Facility: CLINIC | Age: 72
End: 2022-05-16
Attending: PHYSICAL MEDICINE & REHABILITATION
Payer: COMMERCIAL

## 2022-05-16 VITALS
TEMPERATURE: 98.7 F | OXYGEN SATURATION: 98 % | DIASTOLIC BLOOD PRESSURE: 86 MMHG | HEART RATE: 82 BPM | SYSTOLIC BLOOD PRESSURE: 173 MMHG

## 2022-05-16 DIAGNOSIS — G89.29 BILATERAL CHRONIC KNEE PAIN: ICD-10-CM

## 2022-05-16 DIAGNOSIS — M25.561 BILATERAL CHRONIC KNEE PAIN: ICD-10-CM

## 2022-05-16 DIAGNOSIS — M25.562 BILATERAL CHRONIC KNEE PAIN: ICD-10-CM

## 2022-05-16 DIAGNOSIS — M17.0 PRIMARY OSTEOARTHRITIS OF BOTH KNEES: ICD-10-CM

## 2022-05-16 PROCEDURE — 20611 DRAIN/INJ JOINT/BURSA W/US: CPT | Mod: 50 | Performed by: PAIN MEDICINE

## 2022-05-16 RX ORDER — HYALURONATE SODIUM 10 MG/ML
SYRINGE (ML) INTRAARTICULAR
Status: COMPLETED | OUTPATIENT
Start: 2022-05-16 | End: 2022-05-16

## 2022-05-16 RX ORDER — LIDOCAINE HYDROCHLORIDE 10 MG/ML
INJECTION, SOLUTION EPIDURAL; INFILTRATION; INTRACAUDAL; PERINEURAL
Status: COMPLETED | OUTPATIENT
Start: 2022-05-16 | End: 2022-05-16

## 2022-05-16 RX ADMIN — Medication 20 MG: at 13:49

## 2022-05-16 RX ADMIN — Medication 20 MG: at 13:46

## 2022-05-16 RX ADMIN — LIDOCAINE HYDROCHLORIDE 2 ML: 10 INJECTION, SOLUTION EPIDURAL; INFILTRATION; INTRACAUDAL; PERINEURAL at 13:46

## 2022-05-16 ASSESSMENT — PAIN SCALES - GENERAL: PAINLEVEL: MILD PAIN (2)

## 2022-05-16 NOTE — PATIENT INSTRUCTIONS
DISCHARGE INSTRUCTIONS    During office hours (8:00 a.m.- 4:00 p.m.) questions or concerns may be answered  by calling [facility] Nurses at  [phone].  Messages received after hours will be returned the following business day.      In the case of an emergency, please dial 911 or seek assistance at the nearest Emergency Room/Urgent Care facility.     All Patients:    You may experience an increase in your symptoms for the first 2 days (It may take 2-3 weeks for the medication to have maximum effect).    You may use ice on the injection site, as frequently as 20 minutes each hour if needed.    You may take your pain medicine.    You may continue taking your regular medication after your injection.    You may shower. No swimming, tub bath or hot tub for 48 hours.  You may remove your bandaid/bandage as soon as you are home.    You may resume light activities, as tolerated.    Resume your usual diet as tolerated.      POSSIBLE EUFLEXXA SIDE EFFECTS    -If you experience these symptoms, it should only last for a short period    Swelling at injection site  Swelling of the knee joint              Bruising under the skin     Decreased appetite  Headache  Itching  Nausea  Diarrhea  Irritation to the stomach or intestines         POSSIBLE PROCEDURE SIDE EFFECTS  -Call the [facility]ine Center if you are concerned  Increased Pain           Increased numbness/tingling      Nausea/Vomiting          Bruising/bleeding at site      Redness or swelling                                              Difficulty walking      Weakness           Fever greater than 100.5

## 2022-06-01 ENCOUNTER — HOSPITAL ENCOUNTER (OUTPATIENT)
Dept: PHYSICAL THERAPY | Facility: REHABILITATION | Age: 72
Discharge: HOME | End: 2022-06-01
Payer: COMMERCIAL

## 2022-06-01 DIAGNOSIS — M17.0 PRIMARY OSTEOARTHRITIS OF BOTH KNEES: Primary | ICD-10-CM

## 2022-06-01 PROCEDURE — 97110 THERAPEUTIC EXERCISES: CPT | Mod: GP

## 2022-06-01 PROCEDURE — 97112 NEUROMUSCULAR REEDUCATION: CPT | Mod: GP

## 2022-07-18 ENCOUNTER — LAB REQUISITION (OUTPATIENT)
Dept: LAB | Facility: CLINIC | Age: 72
End: 2022-07-18

## 2022-07-18 DIAGNOSIS — E55.9 VITAMIN D DEFICIENCY, UNSPECIFIED: ICD-10-CM

## 2022-07-18 PROCEDURE — 82306 VITAMIN D 25 HYDROXY: CPT | Performed by: NURSE PRACTITIONER

## 2022-07-19 LAB — DEPRECATED CALCIDIOL+CALCIFEROL SERPL-MC: 45 UG/L (ref 20–75)

## 2022-07-19 NOTE — PROGRESS NOTES
Assessment:   Holly Wood (AKA Ama)  is a 72 year old y.o. female with past medical history significant for macular degeneration who presents today for follow-up regarding chronic bilateral low back pain without radicular/sciatic type leg symptoms secondary to sacroiliac joint pain.  Pain remains largely manageable with osteopathic manipulation, with her last treatment on April 5, 2022.     With regards to her bilateral chronic knee pain secondary to primary osteoarthritis of the knees, she has undergone a series of 3 hyaluronic acid injections in May 2022.  At this time, she was reporting at least 50% relief of her pain, which is some soreness in the patella area bilaterally.    She is neurologically intact and without any red flag symptoms.    PSP:  Dr. Gao (Transfer of Care from [facility])       Plan:     A shared decision making plan was used.  The patient's values and choices were respected.  The following represents what was discussed and decided upon by the physician and the patient.      1.  DIAGNOSTIC TESTS:  No further diagnostic tests are necessary at this time.  She does not have any radicular or red flag symptoms that would necessitate an MRI of the low back  -She did have x-rays of her bilateral knees on May 8, 2019 which showed mild osteoarthritis.  2.  PHYSICAL THERAPY:   -She can complete the refresher course of physical therapy for the knees.  -   The patient has previously completed physical therapy for both the back and the knees in the fall 2020 and and is encouraged to continue doing the home exercise program daily.    3.  MEDICATIONS:  No changes to the medications today.    -She can continue to take gabapentin 300 mg at bedtime.  A PDMP check was run today and she is deemed low risk and appropriate for continued gabapentin use  -She has discontinued the Elavil 25 mg at bedtime.  -She can continue to take over-the-counter aspirin.  4.  INTERVENTIONS:  The patient is considered  appropriate for continued osteopathic manipulation.  Osteopathic manipulation was performed to 7 areas today.  The patient tolerated the treatment well and she reported some relief immediately afterwards.   Please see below for the osteopathic manipulation that was performed today.  -She underwent a series of 3 hyaluronic acid injections on May 2, May 9, and May 16, 2022.  She is reporting greater than 50% relief following these injections.  -She underwent bilateral knee corticosteroid injections on January 12, 2022.  These injections provided greater than 80% relief, but the relief only lasted for about 6 weeks.  -She is status post bilateral SI joint injections on November 23, 2021 which provided significant relief of her low back pain.  5.  PATIENT EDUCATION:    - The patient was advised to rest today and drink plenty of water.  Normal activities can be resumed as tolerated tomorrow.    - The patient was told that soreness following the treatment is normal and should improve within a few days.  Ice should be used (as opposed to heat) if soreness occurs.  If the soreness is concerning, the clinic should be notified so further instructions can be given.  -She is encouraged to continue using the occipital release tool as needed.  6.  FOLLOW-UP: The patient is asked to follow-up in 4 weeks for reevaluation. If there are any questions/concerns or any significant worsening of pain prior to that time, the patient is asked to call the clinic via the nurse navigation line or via [employer].     Subjective:     Holly ISAAC Chiraghima (AKA Ama) is a 72 year old female who presents today for follow-up regarding chronic bilateral low back pain without radicular/sciatica type leg symptoms as well as chronic bilateral knee pain.  She is status post a series of 3 hyaluronic acid injections in May 2022.  At this time, she reports that she has had significant relief following these injections.  It took a little while to notice relief but  she definitely feels like she is better.  She says that she has noticed a significant amount of pain in the joint.  She still has some pain in the patellar area bilaterally, but overall she does feel like the improvement is significant.  She continues to have the chronic bilateral low back pain, slightly worse on the left side compared to the right.  It does not radiate into the legs.  She denies any numbness tingling or weakness in the legs.  She is rating her pain today at a 3 out of 10.  At worst it is a 5 out of 10.  At best is a 2 out of 10.  Her back pain is worse with standing and walking for more than 10 to 15 minutes.  Prolonged sitting also aggravates her pain.  She does feel better with aspirin.  She continues to take gabapentin 300 mg at bedtime which helps her sleep.  She states that her neck pain remains under good control.  She would like to continue with osteopathic manipulation and she is always had good relief with this.    She apologizes that she was not able to follow-up sooner but she states that she kept getting exposed to COVID and so she did not want to come in if she potentially could spread the virus.    Past medical history is reviewed and is unchanged in the interim.    Family history is reviewed and is unchanged in the interim.    Review of Systems:.  Pertinent negatives include no numbness, tingling, weakness, headaches, fevers, chills, unexplained weight loss, bowel incontinence, bladder incontinence, trips, stumbles, falls.  All others reviewed and are negative.     Objective:   CONSTITUTIONAL:  Vital signs as above.  No acute distress.  The patient is well nourished and well groomed.    PSYCHIATRIC:  The patient is awake, alert, oriented to person, place and time.  The patient is answering questions appropriately with clear speech.  Normal affect.  SKIN:  Skin over the face, posterior torso, bilateral upper and lower extremities is clean, dry, intact without rashes.  MUSCULOSKELETAL:   Gait is non-antalgic.  The patient is able to transfer independently.      OSTEOPATHIC STRUCTURAL EXAM:  Positive standing flexion test on the left.  Left superior iliac crest.  Lumbar spine: L3-5 flexed, rotated/side bent left  Sacrum: Left on left forward sacral torsion  Innominates/Pelvis: Left up slipped innominate, posterior/superior left  Thoracic spine: T12 flexed, rotated/side bent left  Rib cage: First rib elevated on the left  Cervical spine: C3 flexed, rotated/side bent right, C7 flexed, rotated/side bent  Head/OA joint: Side bent right/rotated left    OMT:  TREATMENTS IN PARENTHESES  Lumbar spine: L3-5 flexed, rotated/side bent left  (Muscle energy, patient in lateral recumbent)  Sacrum: Left on left forward sacral torsion (Myofascial release, patient prone).  Innominates/Pelvis: Left up slipped innominate (Still technique, patient prone)., posterior/superior left  (Muscle energy with the patient supine).  Thoracic spine: T12 flexed, rotated/side bent left  (Muscle energy, patient in lateral recumbent)  Rib cage: First rib elevated on the left (Myofascial release, patient supine)  Cervical spine: C3 flexed, rotated/side bent right, C7 flexed, rotated/side bent  (Muscle energy with the patient supine).  Head/OA joint: Side bent right/rotated left (Myofascial release, patient supine; cranial stacking technique with the patient supine)

## 2022-07-20 ENCOUNTER — OFFICE VISIT (OUTPATIENT)
Dept: PHYSICAL MEDICINE AND REHAB | Facility: CLINIC | Age: 72
End: 2022-07-20
Payer: COMMERCIAL

## 2022-07-20 VITALS — TEMPERATURE: 98.2 F | HEART RATE: 87 BPM | DIASTOLIC BLOOD PRESSURE: 76 MMHG | SYSTOLIC BLOOD PRESSURE: 155 MMHG

## 2022-07-20 DIAGNOSIS — G89.29 CHRONIC BILATERAL LOW BACK PAIN WITHOUT SCIATICA: Primary | ICD-10-CM

## 2022-07-20 DIAGNOSIS — M99.02 THORACIC REGION SOMATIC DYSFUNCTION: ICD-10-CM

## 2022-07-20 DIAGNOSIS — M99.05 SOMATIC DYSFUNCTION OF PELVIS REGION: ICD-10-CM

## 2022-07-20 DIAGNOSIS — M54.50 CHRONIC BILATERAL LOW BACK PAIN WITHOUT SCIATICA: Primary | ICD-10-CM

## 2022-07-20 DIAGNOSIS — G89.29 BILATERAL CHRONIC KNEE PAIN: ICD-10-CM

## 2022-07-20 DIAGNOSIS — M99.04 SACRAL REGION SOMATIC DYSFUNCTION: ICD-10-CM

## 2022-07-20 DIAGNOSIS — M99.08 RIB CAGE REGION SOMATIC DYSFUNCTION: ICD-10-CM

## 2022-07-20 DIAGNOSIS — M17.0 PRIMARY OSTEOARTHRITIS OF BOTH KNEES: ICD-10-CM

## 2022-07-20 DIAGNOSIS — M99.00 HEAD REGION SOMATIC DYSFUNCTION: ICD-10-CM

## 2022-07-20 DIAGNOSIS — M25.562 BILATERAL CHRONIC KNEE PAIN: ICD-10-CM

## 2022-07-20 DIAGNOSIS — M99.03 LUMBAR REGION SOMATIC DYSFUNCTION: ICD-10-CM

## 2022-07-20 DIAGNOSIS — M99.01 CERVICAL SOMATIC DYSFUNCTION: ICD-10-CM

## 2022-07-20 DIAGNOSIS — M25.561 BILATERAL CHRONIC KNEE PAIN: ICD-10-CM

## 2022-07-20 PROCEDURE — 99214 OFFICE O/P EST MOD 30 MIN: CPT | Mod: 25 | Performed by: PHYSICAL MEDICINE & REHABILITATION

## 2022-07-20 PROCEDURE — 98928 OSTEOPATH MANJ 7-8 REGIONS: CPT | Performed by: PHYSICAL MEDICINE & REHABILITATION

## 2022-07-20 ASSESSMENT — PAIN SCALES - GENERAL: PAINLEVEL: MILD PAIN (3)

## 2022-07-20 NOTE — PATIENT INSTRUCTIONS
Ama,    I am so good to see you today.  I am so glad that you have been staying healthy.    You were treated with Osteopathic Manipulative Medicine (OMM) today.    Please rest today and drink plenty of water.   It is okay to do light activities but please do not do any intensive exercises/activities.    It is normal to have soreness following the treatment.  Please use ice over the sore areas.  You may take your usual pain medications.  Please call the clinic at [phone] or contact me (Dr. Gao) via [employer] if the soreness is concerning to you.      Please follow-up with me (Dr. Gao) in 4 weeks.

## 2022-07-20 NOTE — LETTER
7/20/2022         RE: Holly Wood  [address] [address]        Dear Colleague,    Thank you for referring your patient, Holly Wood, to the [facility]. Please see a copy of my visit note below.    Assessment:   Holly Wood (AKKALPANA Serrato)  is a 72 year old y.o. female with past medical history significant for macular degeneration who presents today for follow-up regarding chronic bilateral low back pain without radicular/sciatic type leg symptoms secondary to sacroiliac joint pain.  Pain remains largely manageable with osteopathic manipulation, with her last treatment on April 5, 2022.     With regards to her bilateral chronic knee pain secondary to primary osteoarthritis of the knees, she has undergone a series of 3 hyaluronic acid injections in May 2022.  At this time, she was reporting at least 50% relief of her pain, which is some soreness in the patella area bilaterally.    She is neurologically intact and without any red flag symptoms.    PSP:  Dr. Gao (Transfer of Care from [facility])       Plan:     A shared decision making plan was used.  The patient's values and choices were respected.  The following represents what was discussed and decided upon by the physician and the patient.      1.  DIAGNOSTIC TESTS:  No further diagnostic tests are necessary at this time.  She does not have any radicular or red flag symptoms that would necessitate an MRI of the low back  -She did have x-rays of her bilateral knees on May 8, 2019 which showed mild osteoarthritis.  2.  PHYSICAL THERAPY:   -She can complete the refresher course of physical therapy for the knees.  -   The patient has previously completed physical therapy for both the back and the knees in the fall 2020 and and is encouraged to continue doing the home exercise program daily.    3.  MEDICATIONS:  No changes to the medications today.    -She can continue to take gabapentin 300 mg at  bedtime.  A PDMP check was run today and she is deemed low risk and appropriate for continued gabapentin use  -She has discontinued the Elavil 25 mg at bedtime.  -She can continue to take over-the-counter aspirin.  4.  INTERVENTIONS:  The patient is considered appropriate for continued osteopathic manipulation.  Osteopathic manipulation was performed to 7 areas today.  The patient tolerated the treatment well and she reported some relief immediately afterwards.   Please see below for the osteopathic manipulation that was performed today.  -She underwent a series of 3 hyaluronic acid injections on May 2, May 9, and May 16, 2022.  She is reporting greater than 50% relief following these injections.  -She underwent bilateral knee corticosteroid injections on January 12, 2022.  These injections provided greater than 80% relief, but the relief only lasted for about 6 weeks.  -She is status post bilateral SI joint injections on November 23, 2021 which provided significant relief of her low back pain.  5.  PATIENT EDUCATION:    - The patient was advised to rest today and drink plenty of water.  Normal activities can be resumed as tolerated tomorrow.    - The patient was told that soreness following the treatment is normal and should improve within a few days.  Ice should be used (as opposed to heat) if soreness occurs.  If the soreness is concerning, the clinic should be notified so further instructions can be given.  -She is encouraged to continue using the occipital release tool as needed.  6.  FOLLOW-UP: The patient is asked to follow-up in 4 weeks for reevaluation. If there are any questions/concerns or any significant worsening of pain prior to that time, the patient is asked to call the clinic via the nurse navigation line or via [employer].     Subjective:     Holly MARLIN Wood (AKA Ama) is a 72 year old female who presents today for follow-up regarding chronic bilateral low back pain without radicular/sciatica type  leg symptoms as well as chronic bilateral knee pain.  She is status post a series of 3 hyaluronic acid injections in May 2022.  At this time, she reports that she has had significant relief following these injections.  It took a little while to notice relief but she definitely feels like she is better.  She says that she has noticed a significant amount of pain in the joint.  She still has some pain in the patellar area bilaterally, but overall she does feel like the improvement is significant.  She continues to have the chronic bilateral low back pain, slightly worse on the left side compared to the right.  It does not radiate into the legs.  She denies any numbness tingling or weakness in the legs.  She is rating her pain today at a 3 out of 10.  At worst it is a 5 out of 10.  At best is a 2 out of 10.  Her back pain is worse with standing and walking for more than 10 to 15 minutes.  Prolonged sitting also aggravates her pain.  She does feel better with aspirin.  She continues to take gabapentin 300 mg at bedtime which helps her sleep.  She states that her neck pain remains under good control.  She would like to continue with osteopathic manipulation and she is always had good relief with this.    She apologizes that she was not able to follow-up sooner but she states that she kept getting exposed to COVID and so she did not want to come in if she potentially could spread the virus.    Past medical history is reviewed and is unchanged in the interim.    Family history is reviewed and is unchanged in the interim.    Review of Systems:.  Pertinent negatives include no numbness, tingling, weakness, headaches, fevers, chills, unexplained weight loss, bowel incontinence, bladder incontinence, trips, stumbles, falls.  All others reviewed and are negative.     Objective:   CONSTITUTIONAL:  Vital signs as above.  No acute distress.  The patient is well nourished and well groomed.    PSYCHIATRIC:  The patient is awake,  alert, oriented to person, place and time.  The patient is answering questions appropriately with clear speech.  Normal affect.  SKIN:  Skin over the face, posterior torso, bilateral upper and lower extremities is clean, dry, intact without rashes.  MUSCULOSKELETAL:  Gait is non-antalgic.  The patient is able to transfer independently.      OSTEOPATHIC STRUCTURAL EXAM:  Positive standing flexion test on the left.  Left superior iliac crest.  Lumbar spine: L3-5 flexed, rotated/side bent left  Sacrum: Left on left forward sacral torsion  Innominates/Pelvis: Left up slipped innominate, posterior/superior left  Thoracic spine: T12 flexed, rotated/side bent left  Rib cage: First rib elevated on the left  Cervical spine: C3 flexed, rotated/side bent right, C7 flexed, rotated/side bent  Head/OA joint: Side bent right/rotated left    OMT:  TREATMENTS IN PARENTHESES  Lumbar spine: L3-5 flexed, rotated/side bent left  (Muscle energy, patient in lateral recumbent)  Sacrum: Left on left forward sacral torsion (Myofascial release, patient prone).  Innominates/Pelvis: Left up slipped innominate (Still technique, patient prone)., posterior/superior left  (Muscle energy with the patient supine).  Thoracic spine: T12 flexed, rotated/side bent left  (Muscle energy, patient in lateral recumbent)  Rib cage: First rib elevated on the left (Myofascial release, patient supine)  Cervical spine: C3 flexed, rotated/side bent right, C7 flexed, rotated/side bent  (Muscle energy with the patient supine).  Head/OA joint: Side bent right/rotated left (Myofascial release, patient supine; cranial stacking technique with the patient supine)        Again, thank you for allowing me to participate in the care of your patient.        Sincerely,        Sylvia Young, DO

## 2022-08-30 NOTE — PROGRESS NOTES
Assessment:   Holly Wood (AKA Ama) is a 72 year old y.o. female with past medical history significant for macular degeneration who presents today for follow-up regarding chronic bilateral low back pain without radicular/sciatic type leg symptoms secondary to sacroiliac joint pain.  Her pain remains largely manageable since her last treatment on July 20, 2022.  She continues to have multiple areas of osteopathic somatic dysfunction as listed below.  She remains without any red flag symptoms.    Her chronic knee pain secondary to primary osteoarthritis of the knees remains manageable.          PSP:  Dr. Gao (transfer of care from [facility]).       Plan:     A shared decision making plan was used.  The patient's values and choices were respected.  The following represents what was discussed and decided upon by the physician and the patient.      1.  DIAGNOSTIC TESTS:  No further diagnostic tests are necessary at this time.  She does not have any radicular symptoms or red flag symptoms that would necessitate an MRI of the low back.  -She did have x-rays of her bilateral knees on May 8, 2019 which showed mild osteoarthritis.  2.  PHYSICAL THERAPY: She has completed physical therapy for the knees.  -She has previously completed physical therapy for the back and the knees in the fall 2020.    -She is encouraged to continue doing the home exercise program daily.    3.  MEDICATIONS:  No changes to the medications today.    -She can continue to take gabapentin 300 mg at bedtime.  A PDMP check was done today and she is deemed low risk and appropriate for continued gabapentin use.  -She has discontinued the Elavil at bedtime.  -She can continue to take over-the-counter aspirin as needed.  4.  INTERVENTIONS:  The patient is considered appropriate for continued osteopathic manipulation.  Osteopathic manipulation was performed to 7 areas today.  The patient tolerated the treatment well and she reported relief  immediately afterwards..   Please see below for the osteopathic manipulation that was performed today.  -She underwent a series of 3 hyaluronic acid injections on May 2, May 9, and the 16th of 2022.  She was reporting greater than 50% relief following these injections.  -She is status post bilateral knee corticosteroid injections on January 12, 2022 which provided greater than 80% relief but only for 6 weeks.  -She is status post bilateral SI joint injections on November 23, 2021 which provided greater than 80% relief and continues to provide significant relief  5.  PATIENT EDUCATION:    -I did encourage her to do her physical therapy exercises on a regular basis.  She has gotten away from doing these on a regular basis  - The patient was advised to rest today and drink plenty of water.  Normal activities can be resumed as tolerated tomorrow.    - The patient was told that soreness following the treatment is normal and should improve within a few days.  Ice should be used (as opposed to heat) if soreness occurs.  If the soreness is concerning, the clinic should be notified so further instructions can be given.  -She can continue to use the occipital release tool as needed.  6.  FOLLOW-UP: The patient is asked to follow-up in approximately 4 weeks for reevaluation.. If there are any questions/concerns or any significant worsening of pain prior to that time, the patient is asked to call the clinic via the nurse navigation line or via [employer].     Subjective:     Holly ISAAC Chiraghima (AKA Ama) is a 72 year old female who presents today for follow-up regarding chronic bilateral low back pain..  Since her last visit on July 23, 2022, she reports that her pain has been largely manageable.  She still has her chronic low back pain, but overall she feels that it is controlled and does not significantly limit her.  She continues to have some knee pain, but this is largely better and continues to be improved after her hyaluronic  acid injections.  Overall she is rating her pain today at a 3 out of 10.  At worst is a 7 out of 10.  Best is a 2 out of 10.  Her pain is worse with standing in 1 place.  Stairs and lifting heavy objects also aggravate her pain.  She does feel better with aspirin, sleeping, and elevating her legs and taking the gabapentin.  She always finds osteopathic epilation helpful.  She has completed physical therapy at this time.    Past medical history is reviewed and is unchanged in the interim.    Family history is reviewed and is unchanged in the interim.    Review of Systems:  Pertinent negatives include no numbness, tingling, weakness, headaches, fevers, chills, unexplained weight loss, bowel incontinence, bladder incontinence, trips, stumbles, falls.  All others reviewed and are negative.     Objective:   CONSTITUTIONAL:  Vital signs as above.  No acute distress.  The patient is well nourished and well groomed.    PSYCHIATRIC:  The patient is awake, alert, oriented to person, place and time.  The patient is answering questions appropriately with clear speech.  Normal affect.  SKIN:  Skin over the face, posterior torso, bilateral upper and lower extremities is clean, dry, intact without rashes.  MUSCULOSKELETAL:  Gait is non-antalgic.  The patient is able to transfer independently.      OSTEOPATHIC STRUCTURAL EXAM:  Positive standing flexion test on the left.  Left superior iliac crest.  Lumbar spine: L4-5 flexed, rotated/side bent left  Sacrum: Left unilateral sacral flexion  Innominates/Pelvis: Left up slipped innominate, posterior/superior  Thoracic spine: T4-8 flexed, rotated/side bent left  Rib cage: First rib elevated on the left  Cervical spine: C6/7 flexed, rotated/side bent right, C2 flexed, rotated/side bent right  Head/OA joint: Side bent right/rotated left    OMT:  TREATMENTS IN PARENTHESES  Lumbar spine: L4-5 flexed, rotated/side bent left  (Muscle energy, patient in lateral recumbent)  Sacrum: Left  unilateral sacral flexion (Myofascial release, patient prone).  Innominates/Pelvis: Left up slipped innominate (Still technique, patient prone)., posterior/superior  (Muscle energy with the patient supine).  Thoracic spine: T4-8 flexed, rotated/side bent left (myofascial release with patient seated)  Rib cage: First rib elevated on the left (Myofascial release, patient supine)  Cervical spine: C6/7 flexed, rotated/side bent right, C2 flexed, rotated/side bent right (Muscle energy with the patient supine).  Head/OA joint: Side bent right/rotated left    (Myofascial release, patient supine)

## 2022-08-31 ENCOUNTER — OFFICE VISIT (OUTPATIENT)
Dept: PHYSICAL MEDICINE AND REHAB | Facility: CLINIC | Age: 72
End: 2022-08-31
Payer: COMMERCIAL

## 2022-08-31 VITALS — TEMPERATURE: 98.5 F | DIASTOLIC BLOOD PRESSURE: 85 MMHG | SYSTOLIC BLOOD PRESSURE: 183 MMHG | HEART RATE: 83 BPM

## 2022-08-31 DIAGNOSIS — M99.05 SOMATIC DYSFUNCTION OF PELVIS REGION: ICD-10-CM

## 2022-08-31 DIAGNOSIS — M54.50 CHRONIC BILATERAL LOW BACK PAIN WITHOUT SCIATICA: Primary | ICD-10-CM

## 2022-08-31 DIAGNOSIS — M99.01 CERVICAL SOMATIC DYSFUNCTION: ICD-10-CM

## 2022-08-31 DIAGNOSIS — M99.02 THORACIC REGION SOMATIC DYSFUNCTION: ICD-10-CM

## 2022-08-31 DIAGNOSIS — M99.08 RIB CAGE REGION SOMATIC DYSFUNCTION: ICD-10-CM

## 2022-08-31 DIAGNOSIS — M99.03 LUMBAR REGION SOMATIC DYSFUNCTION: ICD-10-CM

## 2022-08-31 DIAGNOSIS — M99.04 SACRAL REGION SOMATIC DYSFUNCTION: ICD-10-CM

## 2022-08-31 DIAGNOSIS — G89.29 CHRONIC BILATERAL LOW BACK PAIN WITHOUT SCIATICA: Primary | ICD-10-CM

## 2022-08-31 DIAGNOSIS — M99.00 HEAD REGION SOMATIC DYSFUNCTION: ICD-10-CM

## 2022-08-31 PROCEDURE — 98928 OSTEOPATH MANJ 7-8 REGIONS: CPT | Performed by: PHYSICAL MEDICINE & REHABILITATION

## 2022-08-31 PROCEDURE — 99213 OFFICE O/P EST LOW 20 MIN: CPT | Mod: 25 | Performed by: PHYSICAL MEDICINE & REHABILITATION

## 2022-08-31 ASSESSMENT — PAIN SCALES - GENERAL: PAINLEVEL: MILD PAIN (3)

## 2022-08-31 NOTE — LETTER
8/31/2022         RE: Holly Wood  [address] [facility] [address]        Dear Colleague,    Thank you for referring your patient, Holly Wood, to the [facility]. Please see a copy of my visit note below.    Assessment:   Holly Wood (AKKALPANA Serrato) is a 72 year old y.o. female with past medical history significant for macular degeneration who presents today for follow-up regarding chronic bilateral low back pain without radicular/sciatic type leg symptoms secondary to sacroiliac joint pain.  Her pain remains largely manageable since her last treatment on July 20, 2022.  She continues to have multiple areas of osteopathic somatic dysfunction as listed below.  She remains without any red flag symptoms.    Her chronic knee pain secondary to primary osteoarthritis of the knees remains manageable.          PSP:  Dr. Gao (transfer of care from [facility]).       Plan:     A shared decision making plan was used.  The patient's values and choices were respected.  The following represents what was discussed and decided upon by the physician and the patient.      1.  DIAGNOSTIC TESTS:  No further diagnostic tests are necessary at this time.  She does not have any radicular symptoms or red flag symptoms that would necessitate an MRI of the low back.  -She did have x-rays of her bilateral knees on May 8, 2019 which showed mild osteoarthritis.  2.  PHYSICAL THERAPY: She has completed physical therapy for the knees.  -She has previously completed physical therapy for the back and the knees in the fall 2020.    -She is encouraged to continue doing the home exercise program daily.    3.  MEDICATIONS:  No changes to the medications today.    -She can continue to take gabapentin 300 mg at bedtime.  A PDMP check was done today and she is deemed low risk and appropriate for continued gabapentin use.  -She has discontinued the Elavil at bedtime.  -She can continue to take  over-the-counter aspirin as needed.  4.  INTERVENTIONS:  The patient is considered appropriate for continued osteopathic manipulation.  Osteopathic manipulation was performed to 7 areas today.  The patient tolerated the treatment well and she reported relief immediately afterwards..   Please see below for the osteopathic manipulation that was performed today.  -She underwent a series of 3 hyaluronic acid injections on May 2, May 9, and the 16th of 2022.  She was reporting greater than 50% relief following these injections.  -She is status post bilateral knee corticosteroid injections on January 12, 2022 which provided greater than 80% relief but only for 6 weeks.  -She is status post bilateral SI joint injections on November 23, 2021 which provided greater than 80% relief and continues to provide significant relief  5.  PATIENT EDUCATION:    -I did encourage her to do her physical therapy exercises on a regular basis.  She has gotten away from doing these on a regular basis  - The patient was advised to rest today and drink plenty of water.  Normal activities can be resumed as tolerated tomorrow.    - The patient was told that soreness following the treatment is normal and should improve within a few days.  Ice should be used (as opposed to heat) if soreness occurs.  If the soreness is concerning, the clinic should be notified so further instructions can be given.  -She can continue to use the occipital release tool as needed.  6.  FOLLOW-UP: The patient is asked to follow-up in approximately 4 weeks for reevaluation.. If there are any questions/concerns or any significant worsening of pain prior to that time, the patient is asked to call the clinic via the nurse navigation line or via "[employer].     Subjective:     Holly MARLIN Wood (AKA Ama) is a 72 year old female who presents today for follow-up regarding chronic bilateral low back pain..  Since her last visit on July 23, 2022, she reports that her pain has been  largely manageable.  She still has her chronic low back pain, but overall she feels that it is controlled and does not significantly limit her.  She continues to have some knee pain, but this is largely better and continues to be improved after her hyaluronic acid injections.  Overall she is rating her pain today at a 3 out of 10.  At worst is a 7 out of 10.  Best is a 2 out of 10.  Her pain is worse with standing in 1 place.  Stairs and lifting heavy objects also aggravate her pain.  She does feel better with aspirin, sleeping, and elevating her legs and taking the gabapentin.  She always finds osteopathic epilation helpful.  She has completed physical therapy at this time.    Past medical history is reviewed and is unchanged in the interim.    Family history is reviewed and is unchanged in the interim.    Review of Systems:  Pertinent negatives include no numbness, tingling, weakness, headaches, fevers, chills, unexplained weight loss, bowel incontinence, bladder incontinence, trips, stumbles, falls.  All others reviewed and are negative.     Objective:   CONSTITUTIONAL:  Vital signs as above.  No acute distress.  The patient is well nourished and well groomed.    PSYCHIATRIC:  The patient is awake, alert, oriented to person, place and time.  The patient is answering questions appropriately with clear speech.  Normal affect.  SKIN:  Skin over the face, posterior torso, bilateral upper and lower extremities is clean, dry, intact without rashes.  MUSCULOSKELETAL:  Gait is non-antalgic.  The patient is able to transfer independently.      OSTEOPATHIC STRUCTURAL EXAM:  Positive standing flexion test on the left.  Left superior iliac crest.  Lumbar spine: L4-5 flexed, rotated/side bent left  Sacrum: Left unilateral sacral flexion  Innominates/Pelvis: Left up slipped innominate, posterior/superior  Thoracic spine: T4-8 flexed, rotated/side bent left  Rib cage: First rib elevated on the left  Cervical spine: C6/7 flexed,  rotated/side bent right, C2 flexed, rotated/side bent right  Head/OA joint: Side bent right/rotated left    OMT:  TREATMENTS IN PARENTHESES  Lumbar spine: L4-5 flexed, rotated/side bent left  (Muscle energy, patient in lateral recumbent)  Sacrum: Left unilateral sacral flexion (Myofascial release, patient prone).  Innominates/Pelvis: Left up slipped innominate (Still technique, patient prone)., posterior/superior  (Muscle energy with the patient supine).  Thoracic spine: T4-8 flexed, rotated/side bent left (myofascial release with patient seated)  Rib cage: First rib elevated on the left (Myofascial release, patient supine)  Cervical spine: C6/7 flexed, rotated/side bent right, C2 flexed, rotated/side bent right (Muscle energy with the patient supine).  Head/OA joint: Side bent right/rotated left    (Myofascial release, patient supine)        Again, thank you for allowing me to participate in the care of your patient.        Sincerely,        Sylvia Young, DO

## 2022-08-31 NOTE — PATIENT INSTRUCTIONS
Ama,    You were treated with Osteopathic Manipulative Medicine (OMM) today.    Please rest today and drink plenty of water.   It is okay to do light activities but please do not do any intensive exercises/activities.    It is normal to have soreness following the treatment.  Please use ice over the sore areas.  You may take your usual pain medications.  Please call the clinic at [phone] or contact me (Dr. Gao) via [employer] if the soreness is concerning to you.      Please follow-up with me (Dr. Gao) in 4 weeks.

## 2022-09-24 DIAGNOSIS — M25.561 PAIN IN BOTH KNEES, UNSPECIFIED CHRONICITY: ICD-10-CM

## 2022-09-24 DIAGNOSIS — M25.562 PAIN IN BOTH KNEES, UNSPECIFIED CHRONICITY: ICD-10-CM

## 2022-09-24 DIAGNOSIS — M79.18 LEFT BUTTOCK PAIN: ICD-10-CM

## 2022-09-26 RX ORDER — GABAPENTIN 300 MG/1
CAPSULE ORAL
Qty: 270 CAPSULE | Refills: 1 | Status: SHIPPED | OUTPATIENT
Start: 2022-09-26 | End: 2023-12-11

## 2022-09-27 ENCOUNTER — OFFICE VISIT (OUTPATIENT)
Dept: PHYSICAL MEDICINE AND REHAB | Facility: CLINIC | Age: 72
End: 2022-09-27
Payer: COMMERCIAL

## 2022-09-27 VITALS — SYSTOLIC BLOOD PRESSURE: 152 MMHG | DIASTOLIC BLOOD PRESSURE: 84 MMHG

## 2022-09-27 DIAGNOSIS — M99.02 THORACIC REGION SOMATIC DYSFUNCTION: ICD-10-CM

## 2022-09-27 DIAGNOSIS — M53.3 SACROILIAC JOINT PAIN: ICD-10-CM

## 2022-09-27 DIAGNOSIS — M99.01 CERVICAL SOMATIC DYSFUNCTION: ICD-10-CM

## 2022-09-27 DIAGNOSIS — M99.05 SOMATIC DYSFUNCTION OF PELVIS REGION: ICD-10-CM

## 2022-09-27 DIAGNOSIS — M99.04 SACRAL REGION SOMATIC DYSFUNCTION: ICD-10-CM

## 2022-09-27 DIAGNOSIS — M99.00 HEAD REGION SOMATIC DYSFUNCTION: ICD-10-CM

## 2022-09-27 DIAGNOSIS — M99.03 LUMBAR REGION SOMATIC DYSFUNCTION: ICD-10-CM

## 2022-09-27 DIAGNOSIS — M99.08 RIB CAGE REGION SOMATIC DYSFUNCTION: ICD-10-CM

## 2022-09-27 DIAGNOSIS — G89.29 CHRONIC BILATERAL LOW BACK PAIN WITHOUT SCIATICA: Primary | ICD-10-CM

## 2022-09-27 DIAGNOSIS — M54.50 CHRONIC BILATERAL LOW BACK PAIN WITHOUT SCIATICA: Primary | ICD-10-CM

## 2022-09-27 PROCEDURE — 99213 OFFICE O/P EST LOW 20 MIN: CPT | Mod: 25 | Performed by: PHYSICAL MEDICINE & REHABILITATION

## 2022-09-27 PROCEDURE — 98928 OSTEOPATH MANJ 7-8 REGIONS: CPT | Performed by: PHYSICAL MEDICINE & REHABILITATION

## 2022-09-27 ASSESSMENT — PAIN SCALES - GENERAL: PAINLEVEL: MILD PAIN (3)

## 2022-09-27 NOTE — PROGRESS NOTES
Assessment:   Holly Wood (AKA Ama) is a 72 year old y.o. female with past medical history significant for macular degeneration who presents today for follow-up regarding chronic bilateral low back pain without radicular/sciatic type leg symptoms secondary to sacroiliac joint pain.  Since her last visit on August 31, 2022, she has had some worsening of her back pain.  She continues to have multiple areas of osteopathic somatic dysfunction as listed below.  She remains without any red flag symptoms.    Her chronic knee pain secondary to primary osteoarthritis of the knees intermittently bothers her, but is overall manageable        PSP:  Dr. Gao (transfer of care from [facility]).       Plan:     A shared decision making plan was used.  The patient's values and choices were respected.  The following represents what was discussed and decided upon by the physician and the patient.      1.  DIAGNOSTIC TESTS:  No further diagnostic tests are necessary at this time.  She does not have any radicular symptoms or red flag symptoms that would necessitate an MRI of the low back.  -She did have x-rays of her bilateral knees on May 8, 2019 which showed mild osteoarthritis.  2.  PHYSICAL THERAPY: She has completed physical therapy for the knees.  -She has previously completed physical therapy for the back and the knees in the fall 2020.    -She is encouraged to continue doing the home exercise program daily.    3.  MEDICATIONS:  No changes to the medications today.    -She can continue to take gabapentin 300 mg at bedtime.  A PDMP check was done today and she is deemed low risk and appropriate for continued gabapentin use.  -She has discontinued the Elavil at bedtime.  -She can continue to take over-the-counter aspirin as needed.  4.  INTERVENTIONS:   -She was offered repeat bilateral SI joint injections given that she had significant relief with these previously.  She did want to move forward with these so she can  schedule these at least 1 week out to allow time for insurance authorization  -  The patient is considered appropriate for continued osteopathic manipulation.  Osteopathic manipulation was performed to 7 areas today.  The patient tolerated the treatment well and she reported relief immediately afterwards..   Please see below for the osteopathic manipulation that was performed today.  -She underwent a series of 3 hyaluronic acid injections on May 2, May 9, and the 16th of 2022.  She was reporting greater than 50% relief following these injections.  -She is status post bilateral knee corticosteroid injections on January 12, 2022 which provided greater than 80% relief but only for 6 weeks.  -She is status post bilateral SI joint injections on November 23, 2021 which provided greater than 80% relief for 9 months  5.  PATIENT EDUCATION:    - The patient was advised to rest today and drink plenty of water.  Normal activities can be resumed as tolerated tomorrow.    - The patient was told that soreness following the treatment is normal and should improve within a few days.  Ice should be used (as opposed to heat) if soreness occurs.  If the soreness is concerning, the clinic should be notified so further instructions can be given.  -She can continue to use the occipital release tool as needed.  6.  FOLLOW-UP: The patient is asked to follow-up  in 1 week for the injection and in approximately 4 weeks for reevaluation.. If there are any questions/concerns or any significant worsening of pain prior to that time, the patient is asked to call the clinic via the nurse navigation line or via [employer].     Subjective:     Holly ISAAC Chiraghima (AKA Ama) is a 72 year old female who presents today for follow-up regarding chronic bilateral low back pain without radicular/sciatic type leg symptoms.  She reports that the back pain bothers her the most.  Reports that it is achy almost all of the time.  Does go across her low back but she  "denies any sciatica type pain going down into the legs.  She rates her pain today at a 3 out of 10.  At worst is a 6 out of 10.  At best is a 2 out of 10.  Her pain is worse with lifting, sitting, walking, standing.  She does feel better with aspirin and taking the gabapentin.  She does remember that she had quite good relief with previous bilateral SI joint injections.  She does not remember when she last had those but would be interested in repeating those.  She would like to continue with osteopathic manipulation as she always gets good relief with this.    She continues to have intermittent right knee pain.  She says that the knee \"pops out\" though states that it does not actually pop out, but she means that she has really terrible pain.  Reports that it lasts for a very brief period of time but then it is completely fine.  She does feel fatigue in her knees at the end of the day, but the pain she says is \"tricky\" as it is only there intermittently.    Past medical history is reviewed and is unchanged in the interim.    Family history is reviewed and is unchanged in the interim.    Review of Systems:  Pertinent negatives include no numbness, tingling, weakness, headaches, fevers, chills, unexplained weight loss, bowel incontinence, bladder incontinence, trips, stumbles, falls.  All others reviewed and are negative.     Objective:   CONSTITUTIONAL:  Vital signs as above.  No acute distress.  The patient is well nourished and well groomed.    PSYCHIATRIC:  The patient is awake, alert, oriented to person, place and time.  The patient is answering questions appropriately with clear speech.  Normal affect.  SKIN:  Skin over the face, posterior torso, bilateral upper and lower extremities is clean, dry, intact without rashes.  MUSCULOSKELETAL:  Gait is non-antalgic.  The patient is able to transfer independently.      OSTEOPATHIC STRUCTURAL EXAM:  Positive standing flexion test on the left.  Left superior iliac " crest  Lumbar spine: L4-5 flexed, rotated/side bent left  Sacrum: Right unilateral sacral flexion  Innominates/Pelvis: Left up slipped innominate, posterior/superior left  Thoracic spine: T1 flexed, rotated/side bent right  Rib cage: First rib elevated on the left  Cervical spine: C5-7 flexed, rotated/side bent right  Head/OA joint: Side bent right/rotated left    OMT:  TREATMENTS IN PARENTHESES   Lumbar spine: L4-5 flexed, rotated/side bent left  (Muscle energy, patient in lateral recumbent)  Sacrum: Right unilateral sacral flexion (Myofascial release, patient prone).  Innominates/Pelvis: Left up slipped innominate (Still technique, patient prone)., posterior/superior left  (Muscle energy with the patient supine).  Thoracic spine: T1 flexed, rotated/side bent right (Myofascial release, patient supine)  Rib cage: First rib elevated on the left (Myofascial release, patient supine)  Cervical spine: C5-7 flexed, rotated/side bent right  (Muscle energy with the patient supine).  Head/OA joint: Side bent right/rotated left  (Muscle energy with the patient supine).

## 2022-09-27 NOTE — PATIENT INSTRUCTIONS
Ama,    It has been 10 months since we did the sacroiliac joint injections.  I did order repeat bilateral sacroiliac joint injections for you today.    Please note that this injection uses cortisone.  The cortisone may somewhat weaken the immune system.  It is unknown how much the immune system is weakened.  It is unknown if it is weakened to the point that you may be more likely to get the COVID-19 virus, or if you do get the COVID-19 virus, if you would be sicker than you would have been if you had not had the cortisone injection.  If you do not wish to proceed with the injection, please let the nurse/physician know and do NOT schedule the injection.    Please note that since your immune system is weakened from the cortisone, having the FLU or COVID-19 vaccine/shot may be less effective if you have a vaccine within 2 weeks from your cortisone injection.  It is advised to wait 2 weeks after your cortisone injection to have the  FLU or COVID-19 vaccine/shot (or if you have the vaccine/shot first, wait 2 weeks before you have the cortisone injection).    Please schedule this injection at least 1 week  from now to allow time for insurance prior authorization.  On the day of your injection, you cannot be sick or taking antibiotics.  If you become sick and antibiotics are prescribed, please call the clinic so your injection can be rescheduled for once you have completed your antibiotics.  You will need to bring a  with you for your injection.   If you have any questions or concerns prior to your injection, please do not hesitate to call the nurse navigation line at [phone] or contact me through [email or web address].       You were treated with Osteopathic Manipulative Medicine (OMM) today.    Please rest today and drink plenty of water.   It is okay to do light activities but please do not do any intensive exercises/activities.    It is normal to have soreness following the treatment.  Please use ice over the sore areas.   You may take your usual pain medications.  Please call the clinic at [phone] or contact me (Dr. Gao) via [email or web address] if the soreness is concerning to you.      Please follow-up in 1 week for the injection and then again with me (Dr. Gao) in 4 weeks.

## 2022-09-27 NOTE — LETTER
9/27/2022         RE: Holly Wood  [address] [facility] [address]        Dear Colleague,    Thank you for referring your patient, Holly Wood, to the [facility]. Please see a copy of my visit note below.    Assessment:   Holly Wood (AKKALPANA Serrato) is a 72 year old y.o. female with past medical history significant for macular degeneration who presents today for follow-up regarding chronic bilateral low back pain without radicular/sciatic type leg symptoms secondary to sacroiliac joint pain.  Since her last visit on August 31, 2022, she has had some worsening of her back pain.  She continues to have multiple areas of osteopathic somatic dysfunction as listed below.  She remains without any red flag symptoms.    Her chronic knee pain secondary to primary osteoarthritis of the knees intermittently bothers her, but is overall manageable        PSP:  Dr. Gao (transfer of care from [facility]).       Plan:     A shared decision making plan was used.  The patient's values and choices were respected.  The following represents what was discussed and decided upon by the physician and the patient.      1.  DIAGNOSTIC TESTS:  No further diagnostic tests are necessary at this time.  She does not have any radicular symptoms or red flag symptoms that would necessitate an MRI of the low back.  -She did have x-rays of her bilateral knees on May 8, 2019 which showed mild osteoarthritis.  2.  PHYSICAL THERAPY: She has completed physical therapy for the knees.  -She has previously completed physical therapy for the back and the knees in the fall 2020.    -She is encouraged to continue doing the home exercise program daily.    3.  MEDICATIONS:  No changes to the medications today.    -She can continue to take gabapentin 300 mg at bedtime.  A PDMP check was done today and she is deemed low risk and appropriate for continued gabapentin use.  -She has discontinued the Elavil at  bedtime.  -She can continue to take over-the-counter aspirin as needed.  4.  INTERVENTIONS:   -She was offered repeat bilateral SI joint injections given that she had significant relief with these previously.  She did want to move forward with these so she can schedule these at least 1 week out to allow time for insurance authorization  -  The patient is considered appropriate for continued osteopathic manipulation.  Osteopathic manipulation was performed to 7 areas today.  The patient tolerated the treatment well and she reported relief immediately afterwards..   Please see below for the osteopathic manipulation that was performed today.  -She underwent a series of 3 hyaluronic acid injections on May 2, May 9, and the 16th of 2022.  She was reporting greater than 50% relief following these injections.  -She is status post bilateral knee corticosteroid injections on January 12, 2022 which provided greater than 80% relief but only for 6 weeks.  -She is status post bilateral SI joint injections on November 23, 2021 which provided greater than 80% relief for 9 months  5.  PATIENT EDUCATION:    - The patient was advised to rest today and drink plenty of water.  Normal activities can be resumed as tolerated tomorrow.    - The patient was told that soreness following the treatment is normal and should improve within a few days.  Ice should be used (as opposed to heat) if soreness occurs.  If the soreness is concerning, the clinic should be notified so further instructions can be given.  -She can continue to use the occipital release tool as needed.  6.  FOLLOW-UP: The patient is asked to follow-up  in 1 week for the injection and in approximately 4 weeks for reevaluation.. If there are any questions/concerns or any significant worsening of pain prior to that time, the patient is asked to call the clinic via the nurse navigation line or via [employer].     Subjective:     Holly ISAAC Israel (AKA Ama) is a 72 year old female  "who presents today for follow-up regarding chronic bilateral low back pain without radicular/sciatic type leg symptoms.  She reports that the back pain bothers her the most.  Reports that it is achy almost all of the time.  Does go across her low back but she denies any sciatica type pain going down into the legs.  She rates her pain today at a 3 out of 10.  At worst is a 6 out of 10.  At best is a 2 out of 10.  Her pain is worse with lifting, sitting, walking, standing.  She does feel better with aspirin and taking the gabapentin.  She does remember that she had quite good relief with previous bilateral SI joint injections.  She does not remember when she last had those but would be interested in repeating those.  She would like to continue with osteopathic manipulation as she always gets good relief with this.    She continues to have intermittent right knee pain.  She says that the knee \"pops out\" though states that it does not actually pop out, but she means that she has really terrible pain.  Reports that it lasts for a very brief period of time but then it is completely fine.  She does feel fatigue in her knees at the end of the day, but the pain she says is \"tricky\" as it is only there intermittently.    Past medical history is reviewed and is unchanged in the interim.    Family history is reviewed and is unchanged in the interim.    Review of Systems:  Pertinent negatives include no numbness, tingling, weakness, headaches, fevers, chills, unexplained weight loss, bowel incontinence, bladder incontinence, trips, stumbles, falls.  All others reviewed and are negative.     Objective:   CONSTITUTIONAL:  Vital signs as above.  No acute distress.  The patient is well nourished and well groomed.    PSYCHIATRIC:  The patient is awake, alert, oriented to person, place and time.  The patient is answering questions appropriately with clear speech.  Normal affect.  SKIN:  Skin over the face, posterior torso, bilateral " upper and lower extremities is clean, dry, intact without rashes.  MUSCULOSKELETAL:  Gait is non-antalgic.  The patient is able to transfer independently.      OSTEOPATHIC STRUCTURAL EXAM:  Positive standing flexion test on the left.  Left superior iliac crest  Lumbar spine: L4-5 flexed, rotated/side bent left  Sacrum: Right unilateral sacral flexion  Innominates/Pelvis: Left up slipped innominate, posterior/superior left  Thoracic spine: T1 flexed, rotated/side bent right  Rib cage: First rib elevated on the left  Cervical spine: C5-7 flexed, rotated/side bent right  Head/OA joint: Side bent right/rotated left    OMT:  TREATMENTS IN PARENTHESES   Lumbar spine: L4-5 flexed, rotated/side bent left  (Muscle energy, patient in lateral recumbent)  Sacrum: Right unilateral sacral flexion (Myofascial release, patient prone).  Innominates/Pelvis: Left up slipped innominate (Still technique, patient prone)., posterior/superior left  (Muscle energy with the patient supine).  Thoracic spine: T1 flexed, rotated/side bent right (Myofascial release, patient supine)  Rib cage: First rib elevated on the left (Myofascial release, patient supine)  Cervical spine: C5-7 flexed, rotated/side bent right  (Muscle energy with the patient supine).  Head/OA joint: Side bent right/rotated left  (Muscle energy with the patient supine).      Again, thank you for allowing me to participate in the care of your patient.        Sincerely,        Sylvia Young DO

## 2022-10-11 ENCOUNTER — ANCILLARY PROCEDURE (OUTPATIENT)
Dept: MAMMOGRAPHY | Facility: HOSPITAL | Age: 72
End: 2022-10-11
Attending: FAMILY MEDICINE
Payer: COMMERCIAL

## 2022-10-11 DIAGNOSIS — Z12.31 VISIT FOR SCREENING MAMMOGRAM: ICD-10-CM

## 2022-10-11 PROCEDURE — 77067 SCR MAMMO BI INCL CAD: CPT

## 2022-10-13 ENCOUNTER — RADIOLOGY INJECTION OFFICE VISIT (OUTPATIENT)
Dept: PHYSICAL MEDICINE AND REHAB | Facility: CLINIC | Age: 72
End: 2022-10-13
Attending: PHYSICAL MEDICINE & REHABILITATION
Payer: COMMERCIAL

## 2022-10-13 VITALS
TEMPERATURE: 99.4 F | RESPIRATION RATE: 16 BRPM | DIASTOLIC BLOOD PRESSURE: 78 MMHG | SYSTOLIC BLOOD PRESSURE: 160 MMHG | OXYGEN SATURATION: 97 % | HEART RATE: 71 BPM

## 2022-10-13 DIAGNOSIS — M54.50 CHRONIC BILATERAL LOW BACK PAIN WITHOUT SCIATICA: ICD-10-CM

## 2022-10-13 DIAGNOSIS — G89.29 CHRONIC BILATERAL LOW BACK PAIN WITHOUT SCIATICA: ICD-10-CM

## 2022-10-13 DIAGNOSIS — M53.3 SACROILIAC JOINT PAIN: ICD-10-CM

## 2022-10-13 PROCEDURE — 27096 INJECT SACROILIAC JOINT: CPT | Mod: 50 | Performed by: PHYSICAL MEDICINE & REHABILITATION

## 2022-10-13 RX ORDER — METHYLPREDNISOLONE ACETATE 80 MG/ML
INJECTION, SUSPENSION INTRA-ARTICULAR; INTRALESIONAL; INTRAMUSCULAR; SOFT TISSUE
Status: COMPLETED | OUTPATIENT
Start: 2022-10-13 | End: 2022-10-13

## 2022-10-13 RX ORDER — ROPIVACAINE HYDROCHLORIDE 5 MG/ML
INJECTION, SOLUTION EPIDURAL; INFILTRATION; PERINEURAL
Status: COMPLETED | OUTPATIENT
Start: 2022-10-13 | End: 2022-10-13

## 2022-10-13 RX ADMIN — METHYLPREDNISOLONE ACETATE 80 MG: 80 INJECTION, SUSPENSION INTRA-ARTICULAR; INTRALESIONAL; INTRAMUSCULAR; SOFT TISSUE at 14:44

## 2022-10-13 RX ADMIN — ROPIVACAINE HYDROCHLORIDE 5 ML: 5 INJECTION, SOLUTION EPIDURAL; INFILTRATION; PERINEURAL at 14:44

## 2022-10-13 ASSESSMENT — PAIN SCALES - GENERAL
PAINLEVEL: SEVERE PAIN (7)
PAINLEVEL: SEVERE PAIN (6)

## 2022-10-13 NOTE — PATIENT INSTRUCTIONS
Follow-up visit with Dr. Gao in 2 weeks to discuss injection outcome and determine care plan going forward.       DISCHARGE INSTRUCTIONS    During office hours (8:00 a.m.- 4:00 p.m.) questions or concerns may be answered  by calling [facility] Navigation Nurses at  [phone].  Messages received after hours will be returned the following business day.      In the case of an emergency, please dial 911 or seek assistance at the nearest Emergency Room/Urgent Care facility.     All Patients:    You may experience an increase in your symptoms for the first 2 days (It may take anywhere between 2 days- 2 weeks for the steroid to have maximum effect).    You may use ice on the injection site, as frequently as 20 minutes each hour if needed.    You may take your pain medicine.    You may continue taking your regular medication after your injection. If you have had a Medial Branch Block you may resume pain medication once your pain diary is completed.    You may shower. No swimming, tub bath or hot tub for 48 hours.  You may remove your bandaid/bandage as soon as you are home.    You may resume light activities, as tolerated.    Resume your usual diet as tolerated.    It is strongly advised that you do not drive for 1-3 hours post injection.    If you have had oral sedation:  Do not drive for 8 hours post injection.      If you have had IV sedation:  Do not drive for 24 hours post injection.  Do not operate hazardous machinery or make important personal/business decisions for 24 hours.      POSSIBLE STEROID SIDE EFFECTS (If steroid/cortisone was used for your procedure)    -If you experience these symptoms, it should only last for a short period    Swelling of the legs              Skin redness (flushing)     Mouth (oral) irritation   Blood sugar (glucose) levels            Sweats                    Mood changes  Headache  Sleeplessness  Weakened immune system for up to 14 days, which could increase the risk of  james the COVID-19 virus and/or experiencing more severe symptoms of the disease, if exposed.  Decreased effectiveness of the flu vaccine if given within 2 weeks of the steroid.         POSSIBLE PROCEDURE SIDE EFFECTS  -Call the Spine Center if you are concerned  Increased Pain           Increased numbness/tingling      Nausea/Vomiting          Bruising/bleeding at site      Redness or swelling                                              Difficulty walking      Weakness           Fever greater than 100.5    *In the event of a severe headache after an epidural steroid injection that is relieved by lying down, please call the [facility] to speak with a clinical staff member*

## 2022-11-09 NOTE — PROGRESS NOTES
Assessment:   Holly Wood (AKA Ama) is a 72 year old y.o. female with past medical history significant for macular degeneration who presents today for follow-up regarding chronic bilateral low back pain without radicular/sciatic type leg symptoms secondary to sacroiliac joint pain.  Since her last visit on September 27, 2022, she has undergone bilateral sacroiliac joint injections on October 13, 2022.  At this time, she is reporting approximately 50% relief of her pain.  She continues to have multiple areas of osteopathic somatic dysfunction as listed below.  She remains without any red flag symptoms.    Her chronic knee pain secondary to primary osteoarthritis of the knees intermittently bothers her, but is overall manageable.       PSP:  Dr. Gao (transfer of care from [facility]).       Plan:     A shared decision making plan was used.  The patient's values and choices were respected.  The following represents what was discussed and decided upon by the physician and the patient.      1.  DIAGNOSTIC TESTS:  No further diagnostic tests are necessary at this time.  She does not have any radicular symptoms or red flag symptoms that would necessitate an MRI of the low back.  -She did have x-rays of her bilateral knees on May 8, 2019 which showed mild osteoarthritis.  2.  PHYSICAL THERAPY: She has completed physical therapy for the knees.   -She has previously completed physical therapy for the back and the knees in the fall 2020.    -She is encouraged to continue doing the home exercise program daily.    3.  MEDICATIONS:  No changes to the medications today.    -She can continue to take gabapentin 300 mg at bedtime.  A PDMP check was done today and she is deemed low risk and appropriate for continued gabapentin use.  -She has discontinued the Elavil at bedtime.  -She can continue to take over-the-counter aspirin as needed.  4.  INTERVENTIONS:   -  The patient is considered appropriate for continued  osteopathic manipulation.  Osteopathic manipulation was performed to 7 areas today.  The patient tolerated the treatment well and she reported relief immediately afterwards..   Please see below for the osteopathic manipulation that was performed today.  -She is status post bilateral SI joint injections on October 13, 2022.  At this time, she is reporting greater than 50% relief from these injections.  -She underwent a series of 3 hyaluronic acid injections on May 2, May 9, and the 16th of 2022.  She was reporting greater than 50% relief following these injections.  -She is status post bilateral knee corticosteroid injections on January 12, 2022 which provided greater than 80% relief but only for 6 weeks.  -She is status post bilateral SI joint injections on November 23, 2021 which provided greater than 80% relief for 9 months  5.  PATIENT EDUCATION:    - The patient was advised to rest today and drink plenty of water.  Normal activities can be resumed as tolerated tomorrow.    - The patient was told that soreness following the treatment is normal and should improve within a few days.  Ice should be used (as opposed to heat) if soreness occurs.  If the soreness is concerning, the clinic should be notified so further instructions can be given.  -She can continue to use the occipital release tool as needed.  6.  FOLLOW-UP: Patient is asked to follow-up in 4 weeks. If there are any questions/concerns or any significant worsening of pain prior to that time, the patient is asked to call the clinic via the nurse navigation line or via [employer].     Subjective:     Holly Wood (AKA Ama) is a 72 year old female who presents today for follow-up regarding chronic bilateral low back pain without radicular/sciatic type leg symptoms.  Since her last visit on September 27, 2022, she did undergo bilateral sacroiliac joint injections on October 13, 2022.  Reports that she has had at least 50% relief of her pain following these  injections.  She still has some aching over the bilateral SI joints.  She denies any radiation of pain going down into the legs.  No numbness tingling or weakness in the legs.  She rates her pain at a 3 out of 10.  At worst it is an 8 out of 10.  At best is a 2 out of 10.  Her pain is worse with lifting, standing for long periods of time and walking too long.  She does report that she has probably overdone it in the last couple of weeks which is why her pain has been slightly aggravated.  She is pleased with the amount of relief that she had with the injections.  She feels better with taking gabapentin, being in the prone position or sitting with her legs elevated.  She is completed physical therapy at this time.  She is managing her pain with gabapentin 300 mg at bedtime as well as aspirin as needed for pain.  She would like to continue with the osteopathic manipulation.    She reports that she occasionally has some burning in her knees, but overall she feels that the knee pain is manageable.    Past medical history is reviewed and is unchanged in the interim.    Family history is reviewed and is unchanged in the interim.    Review of Systems:  Pertinent negatives include no numbness, tingling, weakness, headaches, fevers, chills, unexplained weight loss, bowel incontinence, bladder incontinence, trips, stumbles, falls.  All others reviewed and are negative.     Objective:   CONSTITUTIONAL:  Vital signs as above.  No acute distress.  The patient is well nourished and well groomed.    PSYCHIATRIC:  The patient is awake, alert, oriented to person, place and time.  The patient is answering questions appropriately with clear speech.  Normal affect.  SKIN:  Skin over the face, posterior torso, bilateral upper and lower extremities is clean, dry, intact without rashes.  MUSCULOSKELETAL:  Gait is non-antalgic.  The patient is able to transfer independently.      OSTEOPATHIC STRUCTURAL EXAM:  Positive standing flexion test  on the left.  Left superior iliac crest.  Lumbar spine: L4-5 flexed, rotated/side bent left  Sacrum: Right unilateral sacral flexion  Innominates/Pelvis: Left up slipped innominate, posterior/superior left  Thoracic spine: T2-4 flexed, rotated/side bent left  Rib cage: First rib elevated on the left  Cervical spine: C7 flexed, rotated/side bent right, C2-3 flexed, rotated/side bent right  Head/OA joint: Side bent right/rotated left    OMT:  TREATMENTS IN PARENTHESES     Lumbar spine: L4-5 flexed, rotated/side bent left (Myofascial release, patient prone).  Sacrum: Right unilateral sacral flexion (Myofascial release, patient prone).  Innominates/Pelvis: Left up slipped innominate (Still technique, patient prone)., posterior/superior left  (Muscle energy with the patient supine).  Thoracic spine: T2-4 flexed, rotated/side bent left (Myofascial release, patient supine)  Rib cage: First rib elevated on the left (Myofascial release, patient supine)  Cervical spine: C7 flexed, rotated/side bent right, C2-3 flexed, rotated/side bent right  (Muscle energy with the patient supine).  Head/OA joint: Side bent right/rotated left  (Muscle energy with the patient supine).

## 2022-11-10 ENCOUNTER — OFFICE VISIT (OUTPATIENT)
Dept: PHYSICAL MEDICINE AND REHAB | Facility: CLINIC | Age: 72
End: 2022-11-10
Payer: COMMERCIAL

## 2022-11-10 VITALS — DIASTOLIC BLOOD PRESSURE: 78 MMHG | SYSTOLIC BLOOD PRESSURE: 126 MMHG | HEART RATE: 77 BPM | OXYGEN SATURATION: 97 %

## 2022-11-10 DIAGNOSIS — M99.08 RIB CAGE REGION SOMATIC DYSFUNCTION: ICD-10-CM

## 2022-11-10 DIAGNOSIS — M53.3 SACROILIAC JOINT PAIN: ICD-10-CM

## 2022-11-10 DIAGNOSIS — M54.50 CHRONIC BILATERAL LOW BACK PAIN WITHOUT SCIATICA: Primary | ICD-10-CM

## 2022-11-10 DIAGNOSIS — M99.01 CERVICAL SOMATIC DYSFUNCTION: ICD-10-CM

## 2022-11-10 DIAGNOSIS — M99.00 HEAD REGION SOMATIC DYSFUNCTION: ICD-10-CM

## 2022-11-10 DIAGNOSIS — M99.05 SOMATIC DYSFUNCTION OF PELVIS REGION: ICD-10-CM

## 2022-11-10 DIAGNOSIS — M99.04 SACRAL REGION SOMATIC DYSFUNCTION: ICD-10-CM

## 2022-11-10 DIAGNOSIS — M99.03 LUMBAR REGION SOMATIC DYSFUNCTION: ICD-10-CM

## 2022-11-10 DIAGNOSIS — M99.02 THORACIC REGION SOMATIC DYSFUNCTION: ICD-10-CM

## 2022-11-10 DIAGNOSIS — G89.29 CHRONIC BILATERAL LOW BACK PAIN WITHOUT SCIATICA: Primary | ICD-10-CM

## 2022-11-10 PROCEDURE — 98928 OSTEOPATH MANJ 7-8 REGIONS: CPT | Performed by: PHYSICAL MEDICINE & REHABILITATION

## 2022-11-10 ASSESSMENT — PAIN SCALES - GENERAL: PAINLEVEL: MILD PAIN (3)

## 2022-11-10 NOTE — PATIENT INSTRUCTIONS
Ama,    You were treated with Osteopathic Manipulative Medicine (OMM) today.    Please rest today and drink plenty of water.   It is okay to do light activities but please do not do any intensive exercises/activities.    It is normal to have soreness following the treatment.  Please use ice over the sore areas.  You may take your usual pain medications.  Please call the clinic at [phone] or contact me (Dr. Gao) via [employer] if the soreness is concerning to you.      Please follow-up with me (Dr. Gao) in 4 weeks.

## 2022-11-10 NOTE — LETTER
11/10/2022         RE: Holly Wood  [address] [facility] [address]        Dear Colleague,    Thank you for referring your patient, Holly Wood, to the [facility]. Please see a copy of my visit note below.    Assessment:   Holly Wood (AKKALPANA Serrato) is a 72 year old y.o. female with past medical history significant for macular degeneration who presents today for follow-up regarding chronic bilateral low back pain without radicular/sciatic type leg symptoms secondary to sacroiliac joint pain.  Since her last visit on September 27, 2022, she has undergone bilateral sacroiliac joint injections on October 13, 2022.  At this time, she is reporting approximately 50% relief of her pain.  She continues to have multiple areas of osteopathic somatic dysfunction as listed below.  She remains without any red flag symptoms.    Her chronic knee pain secondary to primary osteoarthritis of the knees intermittently bothers her, but is overall manageable.       PSP:  Dr. Gao (transfer of care from [facility]).       Plan:     A shared decision making plan was used.  The patient's values and choices were respected.  The following represents what was discussed and decided upon by the physician and the patient.      1.  DIAGNOSTIC TESTS:  No further diagnostic tests are necessary at this time.  She does not have any radicular symptoms or red flag symptoms that would necessitate an MRI of the low back.  -She did have x-rays of her bilateral knees on May 8, 2019 which showed mild osteoarthritis.  2.  PHYSICAL THERAPY: She has completed physical therapy for the knees.   -She has previously completed physical therapy for the back and the knees in the fall 2020.    -She is encouraged to continue doing the home exercise program daily.    3.  MEDICATIONS:  No changes to the medications today.    -She can continue to take gabapentin 300 mg at bedtime.  A PDMP check was done today and  she is deemed low risk and appropriate for continued gabapentin use.  -She has discontinued the Elavil at bedtime.  -She can continue to take over-the-counter aspirin as needed.  4.  INTERVENTIONS:   -  The patient is considered appropriate for continued osteopathic manipulation.  Osteopathic manipulation was performed to 7 areas today.  The patient tolerated the treatment well and she reported relief immediately afterwards..   Please see below for the osteopathic manipulation that was performed today.  -She is status post bilateral SI joint injections on October 13, 2022.  At this time, she is reporting greater than 50% relief from these injections.  -She underwent a series of 3 hyaluronic acid injections on May 2, May 9, and the 16th of 2022.  She was reporting greater than 50% relief following these injections.  -She is status post bilateral knee corticosteroid injections on January 12, 2022 which provided greater than 80% relief but only for 6 weeks.  -She is status post bilateral SI joint injections on November 23, 2021 which provided greater than 80% relief for 9 months  5.  PATIENT EDUCATION:    - The patient was advised to rest today and drink plenty of water.  Normal activities can be resumed as tolerated tomorrow.    - The patient was told that soreness following the treatment is normal and should improve within a few days.  Ice should be used (as opposed to heat) if soreness occurs.  If the soreness is concerning, the clinic should be notified so further instructions can be given.  -She can continue to use the occipital release tool as needed.  6.  FOLLOW-UP: Patient is asked to follow-up in 4 weeks. If there are any questions/concerns or any significant worsening of pain prior to that time, the patient is asked to call the clinic via the nurse navigation line or via [employer].     Subjective:     Holly ISAAC Chiraghima (AKA Ama) is a 72 year old female who presents today for follow-up regarding chronic  bilateral low back pain without radicular/sciatic type leg symptoms.  Since her last visit on September 27, 2022, she did undergo bilateral sacroiliac joint injections on October 13, 2022.  Reports that she has had at least 50% relief of her pain following these injections.  She still has some aching over the bilateral SI joints.  She denies any radiation of pain going down into the legs.  No numbness tingling or weakness in the legs.  She rates her pain at a 3 out of 10.  At worst it is an 8 out of 10.  At best is a 2 out of 10.  Her pain is worse with lifting, standing for long periods of time and walking too long.  She does report that she has probably overdone it in the last couple of weeks which is why her pain has been slightly aggravated.  She is pleased with the amount of relief that she had with the injections.  She feels better with taking gabapentin, being in the prone position or sitting with her legs elevated.  She is completed physical therapy at this time.  She is managing her pain with gabapentin 300 mg at bedtime as well as aspirin as needed for pain.  She would like to continue with the osteopathic manipulation.    She reports that she occasionally has some burning in her knees, but overall she feels that the knee pain is manageable.    Past medical history is reviewed and is unchanged in the interim.    Family history is reviewed and is unchanged in the interim.    Review of Systems:  Pertinent negatives include no numbness, tingling, weakness, headaches, fevers, chills, unexplained weight loss, bowel incontinence, bladder incontinence, trips, stumbles, falls.  All others reviewed and are negative.     Objective:   CONSTITUTIONAL:  Vital signs as above.  No acute distress.  The patient is well nourished and well groomed.    PSYCHIATRIC:  The patient is awake, alert, oriented to person, place and time.  The patient is answering questions appropriately with clear speech.  Normal affect.  SKIN:  Skin  over the face, posterior torso, bilateral upper and lower extremities is clean, dry, intact without rashes.  MUSCULOSKELETAL:  Gait is non-antalgic.  The patient is able to transfer independently.      OSTEOPATHIC STRUCTURAL EXAM:  Positive standing flexion test on the left.  Left superior iliac crest.  Lumbar spine: L4-5 flexed, rotated/side bent left  Sacrum: Right unilateral sacral flexion  Innominates/Pelvis: Left up slipped innominate, posterior/superior left  Thoracic spine: T2-4 flexed, rotated/side bent left  Rib cage: First rib elevated on the left  Cervical spine: C7 flexed, rotated/side bent right, C2-3 flexed, rotated/side bent right  Head/OA joint: Side bent right/rotated left    OMT:  TREATMENTS IN PARENTHESES     Lumbar spine: L4-5 flexed, rotated/side bent left (Myofascial release, patient prone).  Sacrum: Right unilateral sacral flexion (Myofascial release, patient prone).  Innominates/Pelvis: Left up slipped innominate (Still technique, patient prone)., posterior/superior left  (Muscle energy with the patient supine).  Thoracic spine: T2-4 flexed, rotated/side bent left (Myofascial release, patient supine)  Rib cage: First rib elevated on the left (Myofascial release, patient supine)  Cervical spine: C7 flexed, rotated/side bent right, C2-3 flexed, rotated/side bent right  (Muscle energy with the patient supine).  Head/OA joint: Side bent right/rotated left  (Muscle energy with the patient supine).      Again, thank you for allowing me to participate in the care of your patient.        Sincerely,        Sylvia Young DO

## 2022-11-21 NOTE — LETTER
1/12/2022         RE: Holly Wood  [address] [facility] [address]        Dear Colleague,    Thank you for referring your patient, Holly Wood, to the [facility] [place]. Please see a copy of my visit note below.    Assessment:   Holly Wood (AKA Ama)  is a 71 year old y.o. female with past medical history significant for macular degeneration who presents today for follow-up regarding chronic bilateral low back pain without radicular/sciatic type leg symptoms, secondary to sacroiliac joint pain. She reports that her pain has been largely manageable since her last treatment on December 8, 2021. She continues to have multiple areas of osteopathic somatic dysfunction which are listed below. She remains without any red flag symptoms.    The patient continues to have the chronic bilateral knee pain secondary to primary osteoarthritis of the knees. She will be having her bilateral corticosteroid injections this afternoon with Dr. Beltre.      PSP:  Dr. Gao (transfer of care from [place])       Plan:     A shared decision making plan was used.  The patient's values and choices were respected.  The following represents what was discussed and decided upon by the physician and the patient.      1.  DIAGNOSTIC TESTS:  No further diagnostic tests are necessary at this time.  She does not have any radicular or red flag symptoms that would necessitate an MRI of the low back at this time.  2.  PHYSICAL THERAPY: No further physical therapy at this time.  The patient has previously completed physical therapy in the fall 2020 and is encouraged to continue doing the home exercise program daily.    3.  MEDICATIONS:  No changes to the medications today.    -She can continue with gabapentin 300 mg at bedtime.  -She can continue with Elavil 25 mg at bedtime.  -She can continue with over-the-counter aspirin.  4.  INTERVENTIONS: She is deemed appropriate for continued osteopathic  manipulation osteopathic manipulation was performed to 7 areas today.  The patient tolerated the treatment well and she reported some relief immediately afterwards.   Please see below for the osteopathic manipulation that was performed today.  -She is scheduled for bilateral intra-articular knee joint injections under ultrasound guidance with Dr. Beltre today.  -She is status post bilateral sacroiliac joint injections on November 23, 2021 that provided 80% relief.  -She is status post a right intra-articular knee joint injection on May 13, 2021 that provided significant relief.  5.  PATIENT EDUCATION:    -She will be having upcoming cataract surgery. I explained that she does not need to wait between the cataract surgery and the OMT, but if she cannot lay on her stomach for period of time after the cataract procedure, she should let me know so that we avoid putting her in that position during the osteopathic manipulation treatments.  - The patient was advised to rest today and drink plenty of water.  Normal activities can be resumed as tolerated tomorrow.    - The patient was told that soreness following the treatment is normal and should improve within a few days.  Ice should be used (as opposed to heat) if soreness occurs.  If the soreness is concerning, the clinic should be notified so further instructions can be given.  -She is encouraged to continue using the occipital release tool.  6.  FOLLOW-UP: The patient is asked to follow-up 4 weeks. If there are any questions/concerns or any significant worsening of pain prior to that time, the patient is asked to call the clinic via the nurse navigation line or via [employer].     Subjective:     Holly Wood I (AKA Ama) s a 71 year old female who presents today for follow-up regarding chronic bilateral low back pain without radicular/sciatic type leg symptoms. She reports that her pain has been largely manageable since her last treatment on December 8, 2021.  She continues to state that her neck pain is minimal. She is having the bilateral knee pain, but is scheduled for the bilateral knee joint injections this afternoon and she is looking forward to that. She has no major questions or concerns today. She thinks that the back pain has been more sore recently secondary to the increased activities around the holidays. Pain is located in the low back and into the buttocks. She denies any numbness tingling or weakness in the legs. She rates her pain today at a 3 out of 10. At worst it is a 5 out of 10. At best is a 2 out of 10. Pain is worse with lifting. Does feel better with medications, rest, exercise, and she states that she always feels better with osteopathic manipulation. She denies any new symptoms at this time. She is very diligent about doing her exercises for the low back and for the neck. She continues to take amitriptyline, gabapentin, and amitriptyline and states that she finds these helpful for pain relief.    Past medical history is reviewed and is unchanged in the interim.    Family history is reviewed and is unchanged in the interim.    Review of Systems: Positive for stress incontinence of the bladder that is at baseline..  Pertinent negatives include no numbness, tingling, weakness, fevers, chills, unexplained weight loss, bowel incontinence, bladder incontinence, trips, stumbles, falls.  All others reviewed and are negative.     Objective:   CONSTITUTIONAL:  Vital signs as above.  No acute distress.  The patient is well nourished and well groomed.    PSYCHIATRIC:  The patient is awake, alert, oriented to person, place and time.  The patient is answering questions appropriately with clear speech.  Normal affect.  SKIN:  Skin over the face, posterior torso, bilateral upper and lower extremities is clean, dry, intact without rashes.  MUSCULOSKELETAL:  Gait is non-antalgic.  The patient is able to transfer independently.      OSTEOPATHIC STRUCTURAL  EXAM:  Positive standing flexion test on the right. Right inferior iliac crest.  Lumbar spine: Hypertonicity over the left lumbar paraspinal muscles  Sacrum: Right unilateral sacral flexion  Innominates/Pelvis: Right down slipped innominate, anterior/inferior right  Thoracic spine: T1 flexed, rotated/side bent left  Rib cage: First rib elevated on the left  Cervical spine: C2-3 flexed, rotated/side bent right  Head/OA joint: Side bent right/rotated left    OMT:  TREATMENTS IN PARENTHESES  Lumbar spine: Hypertonicity over the left lumbar paraspinal muscles (Myofascial release, patient prone).  Sacrum: Right unilateral sacral flexion (Myofascial release, patient prone).  Innominates/Pelvis: Right down slipped innominate (Myofascial release, patient prone)., anterior/inferior right  (Muscle energy with the patient supine).  Thoracic spine: T1 flexed, rotated/side bent left (Myofascial release, patient supine)  Rib cage: First rib elevated on the left (Myofascial release, patient supine)  Cervical spine: C2-3 flexed, rotated/side bent right  (Muscle energy with the patient supine).  Head/OA joint: Side bent right/rotated left (Myofascial release, patient supine)        Again, thank you for allowing me to participate in the care of your patient.        Sincerely,        Sylvia Young DO     show

## 2022-11-22 ENCOUNTER — LAB REQUISITION (OUTPATIENT)
Dept: LAB | Facility: CLINIC | Age: 72
End: 2022-11-22

## 2022-11-22 DIAGNOSIS — R30.9 PAINFUL MICTURITION, UNSPECIFIED: ICD-10-CM

## 2022-11-22 PROCEDURE — 87086 URINE CULTURE/COLONY COUNT: CPT | Performed by: FAMILY MEDICINE

## 2022-11-24 LAB — BACTERIA UR CULT: ABNORMAL

## 2022-12-13 NOTE — PROGRESS NOTES
[facility]    Outpatient Physical Therapy Discharge Note  Patient: Holly Wood  : 1950    Beginning/End Dates of Reporting Period:  2022 to 2022    Referring Provider: Sylvia Young, DO [place] Diagnosis: Bilateral knee pain     Client Self Report: Pt reports that she was completing her exercises yesterday and had pain in her knees when completing long arc quads. Pt reports burning in her knees. Pt reports that going up stairs is okay, pt reports that going down the stairs hurts. Pt reports that she recently had knee injections and that they helped.      Goals:  Goal Identifier HEP   Goal Description Pt will be independent with HEP to improve mobility and decrease pain.   Target Date 22   Date Met      Progress (detail required for progress note): Not met, pt failed to schedule more appointments     Goal Identifier Standing   Goal Description Pt will report ability to stand longer than 1 hour with less than 1/10 pain to improve functional mobility and increase tolerance completing ADL's and recreational activities.   Target Date 22   Date Met      Progress (detail required for progress note): Not met, pt failed to schedule more appointments     Goal Identifier Walking   Goal Description Pt will report ability to walk longer than 10 minutes with less than 1/10 pain to improve functional mobility and improve quality of life.   Target Date 22   Date Met      Progress (detail required for progress note): Not met, pt failed to schedule more appointments     Goal Identifier Stairs   Goal Description Pt will report ability to go down 1 flight of stairs with less than 1/10 pain to improve functional mobility and increase tolerance completing ADL's and recreational activities.   Target Date 22   Date Met      Progress (detail required for progress note):  Not met, pt failed to schedule more appointments       Plan:  Discharge from therapy.    Discharge:    Reason for Discharge: Patient has failed to schedule further appointments.    Equipment Issued: none    Discharge Plan: Patient to continue home program.  Pt to receive new referral for PT from doctor to schedule future PT appointments.       06/01/22 1530   Signing Clinician's Name / Credentials   Signing clinician's name / credentials Harmony James, PT, DPT   Session Number   Session Number 2   Progress Note/Recertification   Progress Note Due Date   (10th visit)   Recertification Due Date 07/25/22   Adult Goals   PT Ortho Eval Goals 1;2;3;4   Ortho Goal 1   Goal Identifier HEP   Goal Description Pt will be independent with HEP to improve mobility and decrease pain.   Goal Progress Progressing   Target Date 07/25/22   Ortho Goal 2   Goal Identifier Standing   Goal Description Pt will report ability to stand longer than 1 hour with less than 1/10 pain to improve functional mobility and increase tolerance completing ADL's and recreational activities.   Goal Progress Progressing   Target Date 07/25/22   Ortho Goal 3   Goal Identifier Walking   Goal Description Pt will report ability to walk longer than 10 minutes with less than 1/10 pain to improve functional mobility and improve quality of life.   Goal Progress Progressing   Target Date 07/25/22   Ortho Goal 4   Goal Identifier Stairs   Goal Description Pt will report ability to go down 1 flight of stairs with less than 1/10 pain to improve functional mobility and increase tolerance completing ADL's and recreational activities.   Goal Progress Progressing   Target Date 07/25/22   Subjective Report   Subjective Report Pt reports that she was completing her exercises yesterday and had pain in her knees when completing long arc quads. Pt reports burning in her knees. Pt reports that going up stairs is okay, pt reports that going down the stairs hurts. Pt reports that  she recently had knee injections and that they helped.   Treatment Interventions   Interventions Therapeutic Procedure/Exercise;Neuromuscular Re-education   Therapeutic Procedure/exercise   Therapeutic Procedures: strength, endurance, ROM, flexibillity minutes (80049) 30   Skilled Intervention Education on exercises for HEP. Verbal cues, tactile cues and demonstration of correct technique. Pt educated to complete exercises within pain. Pt educated to stop exercises if causes symptoms/pain to worsen.   Patient Response Tolerated well   Treatment Detail NuStep x lvl 3 x 3 minutes, review over HEP, isometric quad x 10 bilaterally, short arc quad x 10 bilaterally, abdominal brace transverse abdominis x 10, bridges x 10, straight leg raise x 10 bilaterally, hip abduction SLR x 10 bilaterally, hip adduction SLR bilaterally x 10, clamshell x 10 bilaterally, reverse clamshells x 10 bilaterally, hooklying roll outs x 10, hooklying roll ins x 10, step downs forward x 2 inch step x 10 bilaterally, step ups forward and step downs backwards x 10 each bilaterally on 4 inch step to 2 inch step   Neuromuscular Re-education   Neuromuscular re-ed of mvmt, balance, coord, kinesthetic sense, posture, proprioception minutes (76288) 12   Skilled Intervention Balance exercises, standing balance   Patient Response Tolerated well   Treatment Detail Wide base of support standing balance x 30 second holds, narrow base of support x 30 second holds, semi-tandem stance x 30 second holds each, wide base of support on foam x 30 second holds, narrow base of support on foam x 30 second holds, semi-tandem stance on foam x 30 second holds each   Equipment Needs   Equipment Needs Pt provided with lvl 1 theraband   Education   Learner Patient   Readiness Eager;Acceptance   Method Booklet/handout;Demonstration   Response Verbalizes Understanding;Demonstrates Understanding   Plan   Homework PTRx   Home program Sit<>stand, seated hamstring stretch,  isometric quad, short arc quad, clamshells, reverse clamshells, SLR, hip abduction SLR, hip adduction SLR, bridges, abdominal brace transverse abdominis   Updates to plan of care Removed long arc quad due to reports of increase in pain   Plan for next session Therapeutic exercise: strengthening, flexibility, ROM; neuro re-ed: balance and gait training   Comments   Comments Pt is a 72 year old female who presents to physical therapy with reports of bilateral knee pain. Pt has decreased BLE strength and decreased BLE flexibility. Pt has positive valgus stress on L. Pt scored 19/80 on LEFS wtih reports of extreme difficulty squatting, walking more than 1 mile, standing for more than 1 hour, running and hopping. Pt would benefit from physical therapy to increase strength, increase flexibility, and decrease pain to improve functional mobility and increase tolerance completing ADL's and recreational activities.   Total Session Time   Timed Code Treatment Minutes 42   Total Treatment Time (sum of timed and untimed services) 42   Medicare Claim Information   Medical Diagnosis Primary osteoarthritis of both knees   PT Diagnosis Bilateral knee pain     Harmony James, PT, DPT

## 2023-02-09 ENCOUNTER — OFFICE VISIT (OUTPATIENT)
Dept: PHYSICAL MEDICINE AND REHAB | Facility: CLINIC | Age: 73
End: 2023-02-09
Payer: COMMERCIAL

## 2023-02-09 VITALS
DIASTOLIC BLOOD PRESSURE: 80 MMHG | HEART RATE: 67 BPM | HEIGHT: 61 IN | SYSTOLIC BLOOD PRESSURE: 150 MMHG | WEIGHT: 203 LBS | BODY MASS INDEX: 38.33 KG/M2

## 2023-02-09 DIAGNOSIS — M79.18 MYOFASCIAL PAIN: ICD-10-CM

## 2023-02-09 DIAGNOSIS — M54.50 CHRONIC BILATERAL LOW BACK PAIN WITHOUT SCIATICA: Primary | ICD-10-CM

## 2023-02-09 DIAGNOSIS — M99.04 SOMATIC DYSFUNCTION OF SACROILIAC JOINT: ICD-10-CM

## 2023-02-09 DIAGNOSIS — M99.00 SOMATIC DYSFUNCTION OF HEAD REGION: ICD-10-CM

## 2023-02-09 DIAGNOSIS — M99.07 SOMATIC DYSFUNCTION OF UPPER EXTREMITY: ICD-10-CM

## 2023-02-09 DIAGNOSIS — M99.08 SOMATIC DYSFUNCTION OF RIB REGION: ICD-10-CM

## 2023-02-09 DIAGNOSIS — M99.02 SOMATIC DYSFUNCTION OF THORACIC REGION: ICD-10-CM

## 2023-02-09 DIAGNOSIS — G89.29 CHRONIC BILATERAL LOW BACK PAIN WITHOUT SCIATICA: Primary | ICD-10-CM

## 2023-02-09 DIAGNOSIS — M99.01 SOMATIC DYSFUNCTION OF CERVICAL REGION: ICD-10-CM

## 2023-02-09 DIAGNOSIS — M43.8X9 SAGITTAL PLANE IMBALANCE: ICD-10-CM

## 2023-02-09 DIAGNOSIS — M99.03 SOMATIC DYSFUNCTION OF LUMBAR REGION: ICD-10-CM

## 2023-02-09 DIAGNOSIS — M54.2 CERVICAL SPINE PAIN: ICD-10-CM

## 2023-02-09 DIAGNOSIS — M99.06 SOMATIC DYSFUNCTION OF LOWER EXTREMITY: ICD-10-CM

## 2023-02-09 DIAGNOSIS — M99.05 SOMATIC DYSFUNCTION OF PELVIS REGION: ICD-10-CM

## 2023-02-09 PROCEDURE — 99214 OFFICE O/P EST MOD 30 MIN: CPT | Performed by: PHYSICAL MEDICINE & REHABILITATION

## 2023-02-09 ASSESSMENT — PAIN SCALES - GENERAL: PAINLEVEL: MILD PAIN (3)

## 2023-02-09 NOTE — PATIENT INSTRUCTIONS
An xray  was ordered for you today.  Please call Radiology at [phone].      Osteopathic manual medicine today

## 2023-02-09 NOTE — PROGRESS NOTES
Assessment/Plan:      Holly was seen today for back pain.    Diagnoses and all orders for this visit:    Chronic bilateral low back pain without sciatica    Myofascial pain    Sagittal plane imbalance    Cervical spine pain    Somatic dysfunction of head region  -     XR Spine Complete Scoliosis 2 Views; Future    Somatic dysfunction of cervical region  -     XR Spine Complete Scoliosis 2 Views; Future    Somatic dysfunction of rib region  -     XR Spine Complete Scoliosis 2 Views; Future    Somatic dysfunction of upper extremity  -     XR Spine Complete Scoliosis 2 Views; Future    Somatic dysfunction of thoracic region  -     XR Spine Complete Scoliosis 2 Views; Future    Somatic dysfunction of lumbar region  -     XR Spine Complete Scoliosis 2 Views; Future    Somatic dysfunction of sacroiliac joint  -     XR Spine Complete Scoliosis 2 Views; Future    Somatic dysfunction of pelvis region  -     XR Spine Complete Scoliosis 2 Views; Future    Somatic dysfunction of lower extremity  -     XR Spine Complete Scoliosis 2 Views; Future         Assessment: Pleasant 72 year old female with past medical history significant for macular degeneration with:    1.  Chronic lumbar spine pain  at the lumbosacral junction and sacroiliac joint.  Consistent with facet arthropathy versus SI pain likely setting of sagittal plane imbalance.  She has thoracic kyphosis.    2.  Sacroiliac joint pain bilaterally.  Contributing to her low back and leg symptoms.    3.  Diffuse whole body pain consistent with myofascial pain likely superimposed polyarticular  osteoarthritis.      4.  Somatic dysfunctions of the cranium, cervical spine, rib cage, upper extremities, thoracic spine, lumbar spine, sacrum, pelvis, lower extremities that contribute to the patient's pain complaints.      Discussion:    1.  We discussed the diagnosis and treatment options.  Discussed the options of imaging such as plain films or MRI.  At this point I would like to  evaluate sagittal plane as well as get a general idea of the osteoarthritis and structure of the lumbar spine.      2.  Scoliosis x-ray imaging of the  spine recommended and ordered.    3.  She does well with Osteopathic manipulative medicine she would like full body treatment today.  This has been helpful in the past with Dr. Gao.  She agrees to proceed.  Please see attached procedure note.    4.  Follow-up with me as needed.  We will follow-up by telephone with results of imaging and further recommendation.          It was our pleasure caring for your patient today, if there any questions or concerns please do not hesitate to contact us.      Subjective:   Patient ID: Holly Wood is a 72 year old female.    History of Present Illness: patient presents for an evaluation of low back pain but also describes cervical spine pain in the arms legs hands feet basically diffuse whole body pain.  She has been a patient of Dr. Gao.  The most significant pain is low back lumbosacral junction and buttock worse with standing or walking.  Better with medication, sitting in correct posture.  Pain is an 8/10 at worst 3/10 today 2/10 at best.  Denies paresthesias or weakness in the arms or legs.  She has polyarthralgias and has Euflexxa injections in the knees with Dr. Beltre which worked great for her and also has had SI joint injections in the past.  Osteopathic manipulative medicine also works very well for her in general.  All of her pains are chronic nothing new.      Imaging: Hip plain films from 2018 reviewed showing mild osteoarthritic changes of the pubic symphysis and both hips.  There also appears to be degenerative disc disease at L4-5 and L5-S1 on limited imaging.    Review of Systems: Pertinent positives:  None .  Pertinent negatives: No numbness, tingling or weakness.  No bowel or bladder incontinence.  No urinary retention.  No fevers, unintentional weight loss, balance changes, headaches, frequent  "falling, difficulty swallowing, or coordination difficulties.  All others reviewed are negative.  Past Medical History:   Diagnosis Date     Breast cyst      GERD (gastroesophageal reflux disease)      Insomnia        The following portions of the patient's history were reviewed and updated as appropriate: allergies, current medications, past family history, past medical history, past social history, past surgical history and problem list.           Objective:   Physical Exam:    BP (!) 150/80   Pulse 67   Ht 5' 1\" (1.549 m)   Wt 203 lb (92.1 kg)   BMI 38.36 kg/m    Body mass index is 38.36 kg/m .       General: Alert and oriented with normal affect. Attention, knowledge, memory, and language are intact. No acute distress.   Eyes: Sclerae are clear.  Respirations: Unlabored. CV: No lower extremity edema.   Gait:  Nonantalgic, increased thoracic kyphotic curve.  May have a slight sagittal plane imbalance anteriorly.  Sensation is intact to light touch throughout the upper and lower extremities.  Reflexes are 2+ and symmetric in the biceps triceps and brachioradialis with negative Hoffmans. 2+ patellar and Achilles      Manual muscle testing reveals:  Right /Left out of 5     5/5 elbow flexors  5/5 elbow extensors  5/5 wrist extensors  5/5 interosseus  5/5 finger flexors  5/5 hip flexors  5/5 knee flexors  5/5 knee extensors  5/5 ankle plantar flexors  5/5 ankle dorsiflexors  5/5  EHL      Structural exam: Cranium: Left cranial torsion and 7 rotation.  OA sidebent left rotated right cervical spine: C2 rotated left sidebent left, C3 rotated right sidebent right, C5 rotated left sidebent left. Rib cage: Rib one elevated on the left. Thoracic spine: T1 rotated right sidebent right, T12 rotated left sidebent left. Lumbar spine: L5 rotated right sidebent left. Pelvis: Right innominate upslip, anterior inferior right innominate. Sacrum: Left on left forward sacral torsion. Lower extremity: Hypertonic hip flexors and " quadriceps bilaterally.. Upper Extremities: myofascial restrictions of the bilat upper trap, infraspinatus/parascapular muscles. bilateral glenohumeral resrictions  And carpal restrictions.    Procedure:    After discussing the risks and benefits of osteopathic manipulative medicine, verbal consent was obtained. The somatic dysfunctions listed above were treated with the following techniques: Cranium: Cranial indirect technique, VSD, and muscle energy for the OA. Cervical spine: Muscle energy, still technique, FPR, myofascial release, BLT, and soft tissue techniques. Rib cage: Myofascial release and FPR. Thoracic spine: Myofascial release, BLT.  Lumbar spine: Myofascial release technique. Pelvis: Still technique, isometrics, gentle respiratory technique. Sacrum: Myofascial release.  Lower extremities: Muscle energy.  Upper Exrtremity: MFR, FPR, BLT.  The patient tolerated the procedure well and had improved range of motion in all areas treated prior to leaving the clinic.

## 2023-02-09 NOTE — LETTER
2/9/2023         RE: Holly Wood  [address] [facility] [address]        Dear Colleague,    Thank you for referring your patient, Holly Wood, to the [facility]. Please see a copy of my visit note below.    Assessment/Plan:      Holly was seen today for back pain.    Diagnoses and all orders for this visit:    Chronic bilateral low back pain without sciatica    Myofascial pain    Sagittal plane imbalance    Cervical spine pain    Somatic dysfunction of head region  -     XR Spine Complete Scoliosis 2 Views; Future    Somatic dysfunction of cervical region  -     XR Spine Complete Scoliosis 2 Views; Future    Somatic dysfunction of rib region  -     XR Spine Complete Scoliosis 2 Views; Future    Somatic dysfunction of upper extremity  -     XR Spine Complete Scoliosis 2 Views; Future    Somatic dysfunction of thoracic region  -     XR Spine Complete Scoliosis 2 Views; Future    Somatic dysfunction of lumbar region  -     XR Spine Complete Scoliosis 2 Views; Future    Somatic dysfunction of sacroiliac joint  -     XR Spine Complete Scoliosis 2 Views; Future    Somatic dysfunction of pelvis region  -     XR Spine Complete Scoliosis 2 Views; Future    Somatic dysfunction of lower extremity  -     XR Spine Complete Scoliosis 2 Views; Future         Assessment: Pleasant 72 year old female with past medical history significant for macular degeneration with:    1.  Chronic lumbar spine pain  at the lumbosacral junction and sacroiliac joint.  Consistent with facet arthropathy versus SI pain likely setting of sagittal plane imbalance.  She has thoracic kyphosis.    2.  Sacroiliac joint pain bilaterally.  Contributing to her low back and leg symptoms.    3.  Diffuse whole body pain consistent with myofascial pain likely superimposed polyarticular  osteoarthritis.      4.  Somatic dysfunctions of the cranium, cervical spine, rib cage, upper extremities, thoracic spine,  lumbar spine, sacrum, pelvis, lower extremities that contribute to the patient's pain complaints.      Discussion:    1.  We discussed the diagnosis and treatment options.  Discussed the options of imaging such as plain films or MRI.  At this point I would like to evaluate sagittal plane as well as get a general idea of the osteoarthritis and structure of the lumbar spine.      2.  Scoliosis x-ray imaging of the  spine recommended and ordered.    3.  She does well with Osteopathic manipulative medicine she would like full body treatment today.  This has been helpful in the past with Dr. Gao.  She agrees to proceed.  Please see attached procedure note.    4.  Follow-up with me as needed.  We will follow-up by telephone with results of imaging and further recommendation.          It was our pleasure caring for your patient today, if there any questions or concerns please do not hesitate to contact us.      Subjective:   Patient ID: Holly Wood is a 72 year old female.    History of Present Illness: patient presents for an evaluation of low back pain but also describes cervical spine pain in the arms legs hands feet basically diffuse whole body pain.  She has been a patient of Dr. Gao.  The most significant pain is low back lumbosacral junction and buttock worse with standing or walking.  Better with medication, sitting in correct posture.  Pain is an 8/10 at worst 3/10 today 2/10 at best.  Denies paresthesias or weakness in the arms or legs.  She has polyarthralgias and has Euflexxa injections in the knees with Dr. Beltre which worked great for her and also has had SI joint injections in the past.  Osteopathic manipulative medicine also works very well for her in general.  All of her pains are chronic nothing new.      Imaging: Hip plain films from 2018 reviewed showing mild osteoarthritic changes of the pubic symphysis and both hips.  There also appears to be degenerative disc disease at L4-5 and  "L5-S1 on limited imaging.    Review of Systems: Pertinent positives:  None .  Pertinent negatives: No numbness, tingling or weakness.  No bowel or bladder incontinence.  No urinary retention.  No fevers, unintentional weight loss, balance changes, headaches, frequent falling, difficulty swallowing, or coordination difficulties.  All others reviewed are negative.  Past Medical History:   Diagnosis Date     Breast cyst      GERD (gastroesophageal reflux disease)      Insomnia        The following portions of the patient's history were reviewed and updated as appropriate: allergies, current medications, past family history, past medical history, past social history, past surgical history and problem list.           Objective:   Physical Exam:    BP (!) 150/80   Pulse 67   Ht 5' 1\" (1.549 m)   Wt 203 lb (92.1 kg)   BMI 38.36 kg/m    Body mass index is 38.36 kg/m .       General: Alert and oriented with normal affect. Attention, knowledge, memory, and language are intact. No acute distress.   Eyes: Sclerae are clear.  Respirations: Unlabored. CV: No lower extremity edema.   Gait:  Nonantalgic, increased thoracic kyphotic curve.  May have a slight sagittal plane imbalance anteriorly.  Sensation is intact to light touch throughout the upper and lower extremities.  Reflexes are 2+ and symmetric in the biceps triceps and brachioradialis with negative Hoffmans. 2+ patellar and Achilles      Manual muscle testing reveals:  Right /Left out of 5     5/5 elbow flexors  5/5 elbow extensors  5/5 wrist extensors  5/5 interosseus  5/5 finger flexors  5/5 hip flexors  5/5 knee flexors  5/5 knee extensors  5/5 ankle plantar flexors  5/5 ankle dorsiflexors  5/5  EHL      Structural exam: Cranium: Left cranial torsion and 7 rotation.  OA sidebent left rotated right cervical spine: C2 rotated left sidebent left, C3 rotated right sidebent right, C5 rotated left sidebent left. Rib cage: Rib one elevated on the left. Thoracic spine: T1 " rotated right sidebent right, T12 rotated left sidebent left. Lumbar spine: L5 rotated right sidebent left. Pelvis: Right innominate upslip, anterior inferior right innominate. Sacrum: Left on left forward sacral torsion. Lower extremity: Hypertonic hip flexors and quadriceps bilaterally.. Upper Extremities: myofascial restrictions of the bilat upper trap, infraspinatus/parascapular muscles. bilateral glenohumeral resrictions  And carpal restrictions.    Procedure:    After discussing the risks and benefits of osteopathic manipulative medicine, verbal consent was obtained. The somatic dysfunctions listed above were treated with the following techniques: Cranium: Cranial indirect technique, VSD, and muscle energy for the OA. Cervical spine: Muscle energy, still technique, FPR, myofascial release, BLT, and soft tissue techniques. Rib cage: Myofascial release and FPR. Thoracic spine: Myofascial release, BLT.  Lumbar spine: Myofascial release technique. Pelvis: Still technique, isometrics, gentle respiratory technique. Sacrum: Myofascial release.  Lower extremities: Muscle energy.  Upper Exrtremity: MFR, FPR, BLT.  The patient tolerated the procedure well and had improved range of motion in all areas treated prior to leaving the clinic.        Again, thank you for allowing me to participate in the care of your patient.        Sincerely,        Wesley Warren DO

## 2023-02-13 ENCOUNTER — HOSPITAL ENCOUNTER (OUTPATIENT)
Dept: RADIOLOGY | Facility: HOSPITAL | Age: 73
Discharge: HOME | End: 2023-02-13
Attending: PHYSICAL MEDICINE & REHABILITATION | Admitting: PHYSICAL MEDICINE & REHABILITATION
Payer: COMMERCIAL

## 2023-02-13 DIAGNOSIS — M99.05 SOMATIC DYSFUNCTION OF PELVIS REGION: ICD-10-CM

## 2023-02-13 DIAGNOSIS — M99.00 SOMATIC DYSFUNCTION OF HEAD REGION: ICD-10-CM

## 2023-02-13 DIAGNOSIS — M99.02 SOMATIC DYSFUNCTION OF THORACIC REGION: ICD-10-CM

## 2023-02-13 DIAGNOSIS — M99.04 SOMATIC DYSFUNCTION OF SACROILIAC JOINT: ICD-10-CM

## 2023-02-13 DIAGNOSIS — M99.03 SOMATIC DYSFUNCTION OF LUMBAR REGION: ICD-10-CM

## 2023-02-13 DIAGNOSIS — M99.07 SOMATIC DYSFUNCTION OF UPPER EXTREMITY: ICD-10-CM

## 2023-02-13 DIAGNOSIS — M99.06 SOMATIC DYSFUNCTION OF LOWER EXTREMITY: ICD-10-CM

## 2023-02-13 DIAGNOSIS — M99.08 SOMATIC DYSFUNCTION OF RIB REGION: ICD-10-CM

## 2023-02-13 DIAGNOSIS — M99.01 SOMATIC DYSFUNCTION OF CERVICAL REGION: ICD-10-CM

## 2023-02-13 PROCEDURE — 72082 X-RAY EXAM ENTIRE SPI 2/3 VW: CPT

## 2023-02-14 ENCOUNTER — TELEPHONE (OUTPATIENT)
Dept: PHYSICAL MEDICINE AND REHAB | Facility: CLINIC | Age: 73
End: 2023-02-14
Payer: COMMERCIAL

## 2023-02-14 NOTE — TELEPHONE ENCOUNTER
----- Message from Wesley Warren DO sent at 2/14/2023  1:39 PM CST -----  I have reviewed the x-ray images of her spine.  She has some wear-and-tear degenerative changes of the discs in the lower lumbar spine.  She also has a slight left lumbar scoliotic curve as well as slight upper thoracic scoliotic curve.  I do not see any significant arthritis of the hips .    I hope the Osteopathic manipulative medicine helpful for her and she can return as needed for follow-up

## 2023-02-15 NOTE — TELEPHONE ENCOUNTER
Patient returned call. Results given and explained. Discussed that she may return to see him as needed. Stated understanding and appreciation for call with results.

## 2023-03-29 ENCOUNTER — OFFICE VISIT (OUTPATIENT)
Dept: PHYSICAL MEDICINE AND REHAB | Facility: CLINIC | Age: 73
End: 2023-03-29
Payer: COMMERCIAL

## 2023-03-29 VITALS — HEART RATE: 72 BPM

## 2023-03-29 DIAGNOSIS — M51.369 DDD (DEGENERATIVE DISC DISEASE), LUMBAR: ICD-10-CM

## 2023-03-29 DIAGNOSIS — M99.03 SOMATIC DYSFUNCTION OF LUMBAR REGION: ICD-10-CM

## 2023-03-29 DIAGNOSIS — M79.18 MYOFASCIAL PAIN: ICD-10-CM

## 2023-03-29 DIAGNOSIS — M99.07 SOMATIC DYSFUNCTION OF UPPER EXTREMITY: ICD-10-CM

## 2023-03-29 DIAGNOSIS — M99.06 SOMATIC DYSFUNCTION OF LOWER EXTREMITY: ICD-10-CM

## 2023-03-29 DIAGNOSIS — M99.01 SOMATIC DYSFUNCTION OF CERVICAL REGION: ICD-10-CM

## 2023-03-29 DIAGNOSIS — M41.20 OTHER IDIOPATHIC SCOLIOSIS, UNSPECIFIED SPINAL REGION: ICD-10-CM

## 2023-03-29 DIAGNOSIS — M17.0 PRIMARY OSTEOARTHRITIS OF BOTH KNEES: ICD-10-CM

## 2023-03-29 DIAGNOSIS — M99.08 SOMATIC DYSFUNCTION OF RIB REGION: ICD-10-CM

## 2023-03-29 DIAGNOSIS — M99.00 SOMATIC DYSFUNCTION OF HEAD REGION: ICD-10-CM

## 2023-03-29 DIAGNOSIS — M99.04 SOMATIC DYSFUNCTION OF SACROILIAC JOINT: ICD-10-CM

## 2023-03-29 DIAGNOSIS — M54.50 CHRONIC BILATERAL LOW BACK PAIN WITHOUT SCIATICA: Primary | ICD-10-CM

## 2023-03-29 DIAGNOSIS — M99.05 SOMATIC DYSFUNCTION OF PELVIS REGION: ICD-10-CM

## 2023-03-29 DIAGNOSIS — M99.02 SOMATIC DYSFUNCTION OF THORACIC REGION: ICD-10-CM

## 2023-03-29 DIAGNOSIS — G89.29 CHRONIC BILATERAL LOW BACK PAIN WITHOUT SCIATICA: Primary | ICD-10-CM

## 2023-03-29 PROCEDURE — 98929 OSTEOPATH MANJ 9-10 REGIONS: CPT | Performed by: PHYSICAL MEDICINE & REHABILITATION

## 2023-03-29 PROCEDURE — 99214 OFFICE O/P EST MOD 30 MIN: CPT | Mod: 25 | Performed by: PHYSICAL MEDICINE & REHABILITATION

## 2023-03-29 RX ORDER — DULOXETIN HYDROCHLORIDE 20 MG/1
20 CAPSULE, DELAYED RELEASE ORAL DAILY
Qty: 30 CAPSULE | Refills: 1 | Status: SHIPPED | OUTPATIENT
Start: 2023-03-29 | End: 2023-06-09

## 2023-03-29 ASSESSMENT — PAIN SCALES - GENERAL: PAINLEVEL: MODERATE PAIN (5)

## 2023-03-29 NOTE — PATIENT INSTRUCTIONS
A knee injection with Euflexxa has been ordered today. Please schedule this injection at least  2 weeks from now to allow time for insurance prior authorization. On the day of your injection, you cannot be sick or taking antibiotics. If you become sick and are prescribed, please call the clinic so your injection can be rescheduled for once you have completed your antibiotics. You will need to bring a  with you for your injection. If you have any questions or concerns prior to your injection, please do not hesitate to call the nurse navigation line at [phone].   Try Cymbalta (Duloxetine) 20 mg daily (at bedtime) to see if it will help with your arthritis pains. If it keep you up at night then take it in the morning  Osteopathic manual medicine today

## 2023-03-29 NOTE — PROGRESS NOTES
Assessment/Plan:      Holly was seen today for back pain.    Diagnoses and all orders for this visit:    Chronic bilateral low back pain without sciatica    Myofascial pain  -     DULoxetine (CYMBALTA) 20 MG capsule; Take 1 capsule (20 mg) by mouth daily    Primary osteoarthritis of both knees  -     PAIN US Large Joint Injection Bilateral; Standing    Other idiopathic scoliosis, unspecified spinal region    Somatic dysfunction of head region  -     OSTEOPATHIC MANIP,9-10 BODY REGN    Somatic dysfunction of cervical region  -     OSTEOPATHIC MANIP,9-10 BODY REGN    Somatic dysfunction of rib region  -     OSTEOPATHIC MANIP,9-10 BODY REGN    Somatic dysfunction of upper extremity  -     OSTEOPATHIC MANIP,9-10 BODY REGN    Somatic dysfunction of thoracic region  -     OSTEOPATHIC MANIP,9-10 BODY REGN    Somatic dysfunction of lumbar region  -     OSTEOPATHIC MANIP,9-10 BODY REGN    Somatic dysfunction of sacroiliac joint  -     OSTEOPATHIC MANIP,9-10 BODY REGN    Somatic dysfunction of pelvis region  -     OSTEOPATHIC MANIP,9-10 BODY REGN    Somatic dysfunction of lower extremity  -     OSTEOPATHIC MANIP,9-10 BODY REGN    DDD (degenerative disc disease), lumbar  -     DULoxetine (CYMBALTA) 20 MG capsule; Take 1 capsule (20 mg) by mouth daily         Assessment: Pleasant 73 year old female with past medical history significant for macular degeneration with:     1.  Chronic lumbar spine pain  at the lumbosacral junction and sacroiliac joint.  Consistent with facet arthropathy versus SI pain likely setting of mild scoliosis and thoracic kyphosis.  She appears to have a functional sagittal plane imbalance with plain films revealing 3 cm positive sagittal plane balance.  She has a cervical thoracic and thoracolumbar mild scoliosis.       2.    Bilateral knee pain consistent with osteoarthritis.  Very mild osteoarthritis on plain films from 2019.  Has responded well to Euflexxa in the past.    3. Sacroiliac joint pain  bilaterally.  Contributing to her low back and leg symptoms.     4.  Diffuse whole body pain consistent with myofascial pain likely superimposed polyarticular  osteoarthritis.       5. Somatic dysfunctions of the cranium, cervical spine, rib cage, upper extremities, thoracic spine, lumbar spine, sacrum, pelvis, lower extremities that contribute to the patient's pain complaints..      Discussion:    1.  I discussed the diagnosis and treatment options.  Reviewed the plain films of the lumbar spine as well as prior imaging of the knees from [facility] in 2019.  We discussed her knee issues and Euflexxa injections have been helpful the last injection in May.  We also discussed Osteopathic manipulative medicine medications and further imaging for the lumbar spine.    2.  Recommend Euflexxa injection for the knees.  This will be with Dr. Beltre that she has had good results with him in the past.    3.  Trial Cymbalta 20 mg at bedtime.  If this activates her then she can switch this to the morning.  She will monitor for side effects and discontinue the medication if it causes any issues.  She is no longer taking amitriptyline.    4.  Recommend Osteopathic manipulative medicine as does offer some improvement in her symptoms.  Recommend full body treatment.  She agrees to proceed.  Please see attached procedure note.    5.  Follow-up with me in 1 month to see how she is doing with the Cymbalta.      It was our pleasure caring for your patient today, if there any questions or concerns please do not hesitate to contact us.      Subjective:   Patient ID: Holly Wood is a 73 year old female.    History of Present Illness:Patient presents for an evaluation of low back pain thoracolumbar pain knee pain.  She has whole-body myofascial pain as well.  Her back is the biggest issue along with her knees.  Her back pain is worse with standing walking lifting or doing stairs better with gabapentin aspirin and lying on  her stomach.  Sitting is helpful for her as well.    Recently she has had increasing bilateral knee pain lateral knees.  This has been an issue in the past.  She has had Euflexxa injections and her last injection was May of last year.      Imaging: I personally reviewed scoliosis imaging of the spine which reveals 15 degrees scoliosis through the thoracic spine 7 degrees through the lumbar spine.  3 cm positive sagittal balance.  No significant hip osteoarthritis.    I reviewed plain films of her bilateral knees from May 2019 [place] [facility].  Shows very mild medial joint space loss bilaterally.    Review of Systems: Pertinent positives:  None .  Pertinent negatives: No numbness, tingling or weakness.  No bowel or bladder incontinence.  No urinary retention.  No fevers, unintentional weight loss, balance changes, headaches, frequent falling, difficulty swallowing, or coordination difficulties.  All others reviewed are negative.    Past Medical History:   Diagnosis Date     Breast cyst      GERD (gastroesophageal reflux disease)      Insomnia        The following portions of the patient's history were reviewed and updated as appropriate: allergies, current medications, past family history, past medical history, past social history, past surgical history and problem list.           Objective:   Physical Exam:    Pulse 72   There is no height or weight on file to calculate BMI.      General: Alert and oriented with normal affect. Attention, knowledge, memory, and language are intact. No acute distress.   Eyes: Sclerae are clear.  Respirations: Unlabored. CV: No lower extremity edema.  Gait:  Nonantalgic, mildly flexed at the waist.  Tenderness palpation bilateral knees lateral joint space.  Baker's cyst bilaterally.  Sensation is intact to light touch throughout the upper and lower extremities.  Reflexes are  2+ patellar and 1+ Achilles      Manual muscle testing reveals:  Right /Left out of 5        5/5 hip  flexors  5/5 knee flexors  5/5 knee extensors  5/5 ankle plantar flexors  5/5 ankle dorsiflexors  5/5  EHL      Structural exam: Cranium: Left cranial torsion with left sidebending rotation OA sidebent right rotated left cervical spine: C2 rotated right sidebent right C3 rotated right sidebent right ,  Rib cage: Rib one elevated on the left. Thoracic spine: T1 rotated right sidebent right, T12 rotated left sidebent left upper thoracic myofascial restrictions.. Lumbar spine: L5 rotated right sidebent left. Pelvis: Right innominate upslip, anterior inferior right innominate. Sacrum: Left unilateral sacral shear.  Lower extremity: Hypertonic hip flexors and quadriceps bilaterally.  Hypertonic hamstrings bilaterally and external tibial torsion on the right.  Upper Extremities: myofascial restrictions of the bilat upper trap, infraspinatus/parascapular muscles.  Left posterior radial head.  Left glenohumeral joint restriction.    Procedure:    After discussing the risks and benefits of osteopathic manipulative medicine, verbal consent was obtained. The somatic dysfunctions listed above were treated with the following techniques: Cranium: Cranial indirect technique, VSD, and muscle energy for the OA. Cervical spine: Muscle energy, still technique, FPR, myofascial release, BLT, and soft tissue techniques. Rib cage: Myofascial release and FPR. Thoracic spine: Myofascial release, BLT.  Lumbar spine: Myofascial release technique, still technique. Pelvis: Still technique and Isometrics. Sacrum: Myofascial release.  Lower extremities: Muscle energy, hamstring spread, BLT.  Upper Exrtremity: MFR, FPR, BLT, mobilization.  The patient tolerated the procedure well and had improved range of motion in all areas treated prior to leaving the clinic.

## 2023-03-29 NOTE — LETTER
3/29/2023         RE: Holly Wood  [address] [facility] [address]        Dear Colleague,    Thank you for referring your patient, Holly Wood, to the [facility]. Please see a copy of my visit note below.    Assessment/Plan:      Holly was seen today for back pain.    Diagnoses and all orders for this visit:    Chronic bilateral low back pain without sciatica    Myofascial pain  -     DULoxetine (CYMBALTA) 20 MG capsule; Take 1 capsule (20 mg) by mouth daily    Primary osteoarthritis of both knees  -     PAIN US Large Joint Injection Bilateral; Standing    Other idiopathic scoliosis, unspecified spinal region    Somatic dysfunction of head region  -     OSTEOPATHIC MANIP,9-10 BODY REGN    Somatic dysfunction of cervical region  -     OSTEOPATHIC MANIP,9-10 BODY REGN    Somatic dysfunction of rib region  -     OSTEOPATHIC MANIP,9-10 BODY REGN    Somatic dysfunction of upper extremity  -     OSTEOPATHIC MANIP,9-10 BODY REGN    Somatic dysfunction of thoracic region  -     OSTEOPATHIC MANIP,9-10 BODY REGN    Somatic dysfunction of lumbar region  -     OSTEOPATHIC MANIP,9-10 BODY REGN    Somatic dysfunction of sacroiliac joint  -     OSTEOPATHIC MANIP,9-10 BODY REGN    Somatic dysfunction of pelvis region  -     OSTEOPATHIC MANIP,9-10 BODY REGN    Somatic dysfunction of lower extremity  -     OSTEOPATHIC MANIP,9-10 BODY REGN    DDD (degenerative disc disease), lumbar  -     DULoxetine (CYMBALTA) 20 MG capsule; Take 1 capsule (20 mg) by mouth daily         Assessment: Pleasant 73 year old female with past medical history significant for macular degeneration with:     1.  Chronic lumbar spine pain  at the lumbosacral junction and sacroiliac joint.  Consistent with facet arthropathy versus SI pain likely setting of mild scoliosis and thoracic kyphosis.  She appears to have a functional sagittal plane imbalance with plain films revealing 3 cm positive sagittal plane  balance.  She has a cervical thoracic and thoracolumbar mild scoliosis.       2.    Bilateral knee pain consistent with osteoarthritis.  Very mild osteoarthritis on plain films from 2019.  Has responded well to Euflexxa in the past.    3. Sacroiliac joint pain bilaterally.  Contributing to her low back and leg symptoms.     4.  Diffuse whole body pain consistent with myofascial pain likely superimposed polyarticular  osteoarthritis.       5. Somatic dysfunctions of the cranium, cervical spine, rib cage, upper extremities, thoracic spine, lumbar spine, sacrum, pelvis, lower extremities that contribute to the patient's pain complaints..      Discussion:    1.  I discussed the diagnosis and treatment options.  Reviewed the plain films of the lumbar spine as well as prior imaging of the knees from [place] [facility] in 2019.  We discussed her knee issues and Euflexxa injections have been helpful the last injection in May.  We also discussed Osteopathic manipulative medicine medications and further imaging for the lumbar spine.    2.  Recommend Euflexxa injection for the knees.  This will be with Dr. Beltre that she has had good results with him in the past.    3.  Trial Cymbalta 20 mg at bedtime.  If this activates her then she can switch this to the morning.  She will monitor for side effects and discontinue the medication if it causes any issues.  She is no longer taking amitriptyline.    4.  Recommend Osteopathic manipulative medicine as does offer some improvement in her symptoms.  Recommend full body treatment.  She agrees to proceed.  Please see attached procedure note.    5.  Follow-up with me in 1 month to see how she is doing with the Cymbalta.      It was our pleasure caring for your patient today, if there any questions or concerns please do not hesitate to contact us.      Subjective:   Patient ID: Holly Wood is a 73 year old female.    History of Present Illness:Patient presents for an evaluation  of low back pain thoracolumbar pain knee pain.  She has whole-body myofascial pain as well.  Her back is the biggest issue along with her knees.  Her back pain is worse with standing walking lifting or doing stairs better with gabapentin aspirin and lying on her stomach.  Sitting is helpful for her as well.    Recently she has had increasing bilateral knee pain lateral knees.  This has been an issue in the past.  She has had Euflexxa injections and her last injection was May of last year.      Imaging: I personally reviewed scoliosis imaging of the spine which reveals 15 degrees scoliosis through the thoracic spine 7 degrees through the lumbar spine.  3 cm positive sagittal balance.  No significant hip osteoarthritis.    I reviewed plain films of her bilateral knees from May 2019 [place] [facility].  Shows very mild medial joint space loss bilaterally.    Review of Systems: Pertinent positives:  None .  Pertinent negatives: No numbness, tingling or weakness.  No bowel or bladder incontinence.  No urinary retention.  No fevers, unintentional weight loss, balance changes, headaches, frequent falling, difficulty swallowing, or coordination difficulties.  All others reviewed are negative.    Past Medical History:   Diagnosis Date     Breast cyst      GERD (gastroesophageal reflux disease)      Insomnia        The following portions of the patient's history were reviewed and updated as appropriate: allergies, current medications, past family history, past medical history, past social history, past surgical history and problem list.           Objective:   Physical Exam:    Pulse 72   There is no height or weight on file to calculate BMI.      General: Alert and oriented with normal affect. Attention, knowledge, memory, and language are intact. No acute distress.   Eyes: Sclerae are clear.  Respirations: Unlabored. CV: No lower extremity edema.  Gait:  Nonantalgic, mildly flexed at the waist.  Tenderness palpation bilateral  knees lateral joint space.  Baker's cyst bilaterally.  Sensation is intact to light touch throughout the upper and lower extremities.  Reflexes are  2+ patellar and 1+ Achilles      Manual muscle testing reveals:  Right /Left out of 5        5/5 hip flexors  5/5 knee flexors  5/5 knee extensors  5/5 ankle plantar flexors  5/5 ankle dorsiflexors  5/5  EHL      Structural exam: Cranium: Left cranial torsion with left sidebending rotation OA sidebent right rotated left cervical spine: C2 rotated right sidebent right C3 rotated right sidebent right ,  Rib cage: Rib one elevated on the left. Thoracic spine: T1 rotated right sidebent right, T12 rotated left sidebent left upper thoracic myofascial restrictions.. Lumbar spine: L5 rotated right sidebent left. Pelvis: Right innominate upslip, anterior inferior right innominate. Sacrum: Left unilateral sacral shear.  Lower extremity: Hypertonic hip flexors and quadriceps bilaterally.  Hypertonic hamstrings bilaterally and external tibial torsion on the right.  Upper Extremities: myofascial restrictions of the bilat upper trap, infraspinatus/parascapular muscles.  Left posterior radial head.  Left glenohumeral joint restriction.    Procedure:    After discussing the risks and benefits of osteopathic manipulative medicine, verbal consent was obtained. The somatic dysfunctions listed above were treated with the following techniques: Cranium: Cranial indirect technique, VSD, and muscle energy for the OA. Cervical spine: Muscle energy, still technique, FPR, myofascial release, BLT, and soft tissue techniques. Rib cage: Myofascial release and FPR. Thoracic spine: Myofascial release, BLT.  Lumbar spine: Myofascial release technique, still technique. Pelvis: Still technique and Isometrics. Sacrum: Myofascial release.  Lower extremities: Muscle energy, hamstring spread, BLT.  Upper Exrtremity: MFR, FPR, BLT, mobilization.  The patient tolerated the procedure well and had improved range  of motion in all areas treated prior to leaving the clinic.        Again, thank you for allowing me to participate in the care of your patient.        Sincerely,        Wesley Warren, DO

## 2023-04-25 ENCOUNTER — RADIOLOGY INJECTION OFFICE VISIT (OUTPATIENT)
Dept: PHYSICAL MEDICINE AND REHAB | Facility: CLINIC | Age: 73
End: 2023-04-25
Attending: PHYSICAL MEDICINE & REHABILITATION
Payer: COMMERCIAL

## 2023-04-25 VITALS — SYSTOLIC BLOOD PRESSURE: 188 MMHG | OXYGEN SATURATION: 96 % | DIASTOLIC BLOOD PRESSURE: 86 MMHG | HEART RATE: 81 BPM

## 2023-04-25 DIAGNOSIS — M17.0 PRIMARY OSTEOARTHRITIS OF BOTH KNEES: ICD-10-CM

## 2023-04-25 PROCEDURE — 20611 DRAIN/INJ JOINT/BURSA W/US: CPT | Mod: 50 | Performed by: PAIN MEDICINE

## 2023-04-25 PROCEDURE — 99207 PAIN US LARGE JOINT INJECTION BILATERAL: CPT | Performed by: PAIN MEDICINE

## 2023-04-25 RX ORDER — HYALURONATE SODIUM 10 MG/ML
SYRINGE (ML) INTRAARTICULAR
Status: COMPLETED | OUTPATIENT
Start: 2023-04-25 | End: 2023-04-25

## 2023-04-25 RX ORDER — LIDOCAINE HYDROCHLORIDE 10 MG/ML
INJECTION, SOLUTION EPIDURAL; INFILTRATION; INTRACAUDAL; PERINEURAL
Status: COMPLETED | OUTPATIENT
Start: 2023-04-25 | End: 2023-04-25

## 2023-04-25 RX ADMIN — LIDOCAINE HYDROCHLORIDE 2 ML: 10 INJECTION, SOLUTION EPIDURAL; INFILTRATION; INTRACAUDAL; PERINEURAL at 13:28

## 2023-04-25 RX ADMIN — Medication 40 MG: at 13:29

## 2023-04-25 ASSESSMENT — PAIN SCALES - GENERAL: PAINLEVEL: MODERATE PAIN (5)

## 2023-04-25 NOTE — PATIENT INSTRUCTIONS
DISCHARGE INSTRUCTIONS    During office hours (8:00 a.m.- 4:00 p.m.) questions or concerns may be answered  by calling [facility] Navigation Nurses at  [phone].  Messages received after hours will be returned the following business day.      In the case of an emergency, please dial 911 or seek assistance at the nearest Emergency Room/Urgent Care facility.     All Patients:    You may experience an increase in your symptoms for the first 2 days (It may take 2-3 weeks for the medication to have maximum effect).    You may use ice on the injection site, as frequently as 20 minutes each hour if needed.    You may take your pain medicine.    You may continue taking your regular medication after your injection.    You may shower. No swimming, tub bath or hot tub for 48 hours.  You may remove your bandaid/bandage as soon as you are home.    You may resume light activities, as tolerated.    Resume your usual diet as tolerated.      POSSIBLE EUFLEXXA SIDE EFFECTS  -If you experience these symptoms, it should only last for a short period  Swelling at injection site  Swelling of the knee joint              Bruising under the skin     Decreased appetite  Headache  Itching  Nausea  Diarrhea  Irritation to the stomach or intestines         POSSIBLE PROCEDURE SIDE EFFECTS  -Call the Spine Center if you are concerned  Increased Pain           Increased numbness/tingling      Nausea/Vomiting          Bruising/bleeding at site      Redness or swelling                                              Difficulty walking      Weakness           Fever greater than 100.5

## 2023-05-01 ENCOUNTER — LAB REQUISITION (OUTPATIENT)
Dept: LAB | Facility: CLINIC | Age: 73
End: 2023-05-01

## 2023-05-01 DIAGNOSIS — Z13.220 ENCOUNTER FOR SCREENING FOR LIPOID DISORDERS: ICD-10-CM

## 2023-05-01 DIAGNOSIS — I10 ESSENTIAL (PRIMARY) HYPERTENSION: ICD-10-CM

## 2023-05-01 LAB
ALBUMIN SERPL BCG-MCNC: 4.2 G/DL (ref 3.5–5.2)
ALP SERPL-CCNC: 103 U/L (ref 35–104)
ALT SERPL W P-5'-P-CCNC: 27 U/L (ref 10–35)
ANION GAP SERPL CALCULATED.3IONS-SCNC: 10 MMOL/L (ref 7–15)
AST SERPL W P-5'-P-CCNC: 28 U/L (ref 10–35)
BILIRUB SERPL-MCNC: 0.2 MG/DL
BUN SERPL-MCNC: 19.8 MG/DL (ref 8–23)
CALCIUM SERPL-MCNC: 9.5 MG/DL (ref 8.8–10.2)
CHLORIDE SERPL-SCNC: 106 MMOL/L (ref 98–107)
CHOLEST SERPL-MCNC: 188 MG/DL
CREAT SERPL-MCNC: 0.66 MG/DL (ref 0.51–0.95)
DEPRECATED HCO3 PLAS-SCNC: 24 MMOL/L (ref 22–29)
GFR SERPL CREATININE-BSD FRML MDRD: >90 ML/MIN/1.73M2
GLUCOSE SERPL-MCNC: 120 MG/DL (ref 70–99)
HDLC SERPL-MCNC: 54 MG/DL
LDLC SERPL CALC-MCNC: 107 MG/DL
MAGNESIUM SERPL-MCNC: 2.1 MG/DL (ref 1.7–2.3)
NONHDLC SERPL-MCNC: 134 MG/DL
POTASSIUM SERPL-SCNC: 4 MMOL/L (ref 3.4–5.3)
PROT SERPL-MCNC: 6.5 G/DL (ref 6.4–8.3)
SODIUM SERPL-SCNC: 140 MMOL/L (ref 136–145)
TRIGL SERPL-MCNC: 133 MG/DL

## 2023-05-01 PROCEDURE — 80061 LIPID PANEL: CPT | Performed by: NURSE PRACTITIONER

## 2023-05-01 PROCEDURE — 80053 COMPREHEN METABOLIC PANEL: CPT | Performed by: NURSE PRACTITIONER

## 2023-05-01 PROCEDURE — 83735 ASSAY OF MAGNESIUM: CPT | Performed by: NURSE PRACTITIONER

## 2023-05-02 ENCOUNTER — RADIOLOGY INJECTION OFFICE VISIT (OUTPATIENT)
Dept: PHYSICAL MEDICINE AND REHAB | Facility: CLINIC | Age: 73
End: 2023-05-02
Attending: PHYSICAL MEDICINE & REHABILITATION
Payer: COMMERCIAL

## 2023-05-02 VITALS
OXYGEN SATURATION: 97 % | HEART RATE: 76 BPM | TEMPERATURE: 97.7 F | DIASTOLIC BLOOD PRESSURE: 82 MMHG | SYSTOLIC BLOOD PRESSURE: 177 MMHG

## 2023-05-02 DIAGNOSIS — M17.0 PRIMARY OSTEOARTHRITIS OF BOTH KNEES: ICD-10-CM

## 2023-05-02 PROCEDURE — 20611 DRAIN/INJ JOINT/BURSA W/US: CPT | Mod: 50 | Performed by: PAIN MEDICINE

## 2023-05-02 RX ORDER — HYALURONATE SODIUM 10 MG/ML
SYRINGE (ML) INTRAARTICULAR
Status: COMPLETED | OUTPATIENT
Start: 2023-05-02 | End: 2023-05-02

## 2023-05-02 RX ORDER — LIDOCAINE HYDROCHLORIDE 10 MG/ML
INJECTION, SOLUTION EPIDURAL; INFILTRATION; INTRACAUDAL; PERINEURAL
Status: COMPLETED | OUTPATIENT
Start: 2023-05-02 | End: 2023-05-02

## 2023-05-02 RX ADMIN — Medication 40 MG: at 13:34

## 2023-05-02 RX ADMIN — LIDOCAINE HYDROCHLORIDE 2 ML: 10 INJECTION, SOLUTION EPIDURAL; INFILTRATION; INTRACAUDAL; PERINEURAL at 13:33

## 2023-05-02 ASSESSMENT — PAIN SCALES - GENERAL: PAINLEVEL: MODERATE PAIN (5)

## 2023-05-02 NOTE — PATIENT INSTRUCTIONS
DISCHARGE INSTRUCTIONS    During office hours (8:00 a.m.- 4:00 p.m.) questions or concerns may be answered  by calling [facility] Navigation Nurses at  [phone].  Messages received after hours will be returned the following business day.      In the case of an emergency, please dial 911 or seek assistance at the nearest Emergency Room/Urgent Care facility.     All Patients:    You may experience an increase in your symptoms for the first 2 days (It may take 2-3 weeks for the medication to have maximum effect).    You may use ice on the injection site, as frequently as 20 minutes each hour if needed.    You may take your pain medicine.    You may continue taking your regular medication after your injection.    You may shower. No swimming, tub bath or hot tub for 48 hours.  You may remove your bandaid/bandage as soon as you are home.    You may resume light activities, as tolerated.    Resume your usual diet as tolerated.      POSSIBLE EUFLEXXA SIDE EFFECTS    -If you experience these symptoms, it should only last for a short period    Swelling at injection site  Swelling of the knee joint              Bruising under the skin     Decreased appetite  Headache  Itching  Nausea  Diarrhea  Irritation to the stomach or intestines         POSSIBLE PROCEDURE SIDE EFFECTS  -Call the Spine Center if you are concerned  Increased Pain           Increased numbness/tingling      Nausea/Vomiting          Bruising/bleeding at site      Redness or swelling                                              Difficulty walking      Weakness           Fever greater than 100.5

## 2023-05-12 ENCOUNTER — RADIOLOGY INJECTION OFFICE VISIT (OUTPATIENT)
Dept: PHYSICAL MEDICINE AND REHAB | Facility: CLINIC | Age: 73
End: 2023-05-12
Attending: PHYSICAL MEDICINE & REHABILITATION
Payer: COMMERCIAL

## 2023-05-12 VITALS
DIASTOLIC BLOOD PRESSURE: 78 MMHG | OXYGEN SATURATION: 96 % | TEMPERATURE: 98.6 F | HEART RATE: 83 BPM | SYSTOLIC BLOOD PRESSURE: 102 MMHG

## 2023-05-12 DIAGNOSIS — M17.0 PRIMARY OSTEOARTHRITIS OF BOTH KNEES: ICD-10-CM

## 2023-05-12 PROCEDURE — 20611 DRAIN/INJ JOINT/BURSA W/US: CPT | Mod: 50 | Performed by: PAIN MEDICINE

## 2023-05-12 RX ORDER — HYALURONATE SODIUM 10 MG/ML
SYRINGE (ML) INTRAARTICULAR
Status: COMPLETED | OUTPATIENT
Start: 2023-05-12 | End: 2023-05-12

## 2023-05-12 RX ORDER — LIDOCAINE HYDROCHLORIDE 10 MG/ML
INJECTION, SOLUTION EPIDURAL; INFILTRATION; INTRACAUDAL; PERINEURAL
Status: COMPLETED | OUTPATIENT
Start: 2023-05-12 | End: 2023-05-12

## 2023-05-12 RX ADMIN — Medication 20 MG: at 14:11

## 2023-05-12 RX ADMIN — Medication 20 MG: at 14:10

## 2023-05-12 RX ADMIN — LIDOCAINE HYDROCHLORIDE 1 ML: 10 INJECTION, SOLUTION EPIDURAL; INFILTRATION; INTRACAUDAL; PERINEURAL at 14:09

## 2023-05-12 ASSESSMENT — PAIN SCALES - GENERAL: PAINLEVEL: MODERATE PAIN (4)

## 2023-05-12 NOTE — PATIENT INSTRUCTIONS
DISCHARGE INSTRUCTIONS    During office hours (8:00 a.m.- 4:00 p.m.) questions or concerns may be answered  by calling [facility] Navigation Nurses at  [phone].  Messages received after hours will be returned the following business day.      In the case of an emergency, please dial 911 or seek assistance at the nearest Emergency Room/Urgent Care facility.     All Patients:    You may experience an increase in your symptoms for the first 2 days (It may take 2-3 weeks for the medication to have maximum effect).    You may use ice on the injection site, as frequently as 20 minutes each hour if needed.    You may take your pain medicine.    You may continue taking your regular medication after your injection.    You may shower. No swimming, tub bath or hot tub for 48 hours.  You may remove your bandaid/bandage as soon as you are home.    You may resume light activities, as tolerated.    Resume your usual diet as tolerated.      POSSIBLE EUFLEXXA SIDE EFFECTS    -If you experience these symptoms, it should only last for a short period    Swelling at injection site  Swelling of the knee joint              Bruising under the skin     Decreased appetite  Headache  Itching  Nausea  Diarrhea  Irritation to the stomach or intestines         POSSIBLE PROCEDURE SIDE EFFECTS  -Call the Spine Center if you are concerned  Increased Pain           Increased numbness/tingling      Nausea/Vomiting          Bruising/bleeding at site      Redness or swelling                                              Difficulty walking      Weakness           Fever greater than 100.5

## 2023-06-09 DIAGNOSIS — M79.18 MYOFASCIAL PAIN: ICD-10-CM

## 2023-06-09 DIAGNOSIS — M51.369 DDD (DEGENERATIVE DISC DISEASE), LUMBAR: ICD-10-CM

## 2023-06-09 RX ORDER — DULOXETIN HYDROCHLORIDE 20 MG/1
CAPSULE, DELAYED RELEASE ORAL
Qty: 30 CAPSULE | Refills: 1 | Status: SHIPPED | OUTPATIENT
Start: 2023-06-09 | End: 2024-01-09

## 2023-06-19 ENCOUNTER — OFFICE VISIT (OUTPATIENT)
Dept: PHYSICAL MEDICINE AND REHAB | Facility: CLINIC | Age: 73
End: 2023-06-19
Payer: COMMERCIAL

## 2023-06-19 VITALS — DIASTOLIC BLOOD PRESSURE: 86 MMHG | SYSTOLIC BLOOD PRESSURE: 184 MMHG | HEART RATE: 76 BPM

## 2023-06-19 DIAGNOSIS — M79.18 MYOFASCIAL PAIN: ICD-10-CM

## 2023-06-19 DIAGNOSIS — M99.05 SOMATIC DYSFUNCTION OF PELVIS REGION: ICD-10-CM

## 2023-06-19 DIAGNOSIS — M99.07 SOMATIC DYSFUNCTION OF UPPER EXTREMITY: ICD-10-CM

## 2023-06-19 DIAGNOSIS — M99.04 SOMATIC DYSFUNCTION OF SACROILIAC JOINT: ICD-10-CM

## 2023-06-19 DIAGNOSIS — M17.0 PRIMARY OSTEOARTHRITIS OF BOTH KNEES: ICD-10-CM

## 2023-06-19 DIAGNOSIS — M99.06 SOMATIC DYSFUNCTION OF LOWER EXTREMITY: ICD-10-CM

## 2023-06-19 DIAGNOSIS — S29.019A THORACIC MYOFASCIAL STRAIN, INITIAL ENCOUNTER: Primary | ICD-10-CM

## 2023-06-19 DIAGNOSIS — M51.369 DDD (DEGENERATIVE DISC DISEASE), LUMBAR: ICD-10-CM

## 2023-06-19 DIAGNOSIS — M99.02 SOMATIC DYSFUNCTION OF THORACIC REGION: ICD-10-CM

## 2023-06-19 DIAGNOSIS — M99.08 SOMATIC DYSFUNCTION OF RIB REGION: ICD-10-CM

## 2023-06-19 DIAGNOSIS — M99.01 SOMATIC DYSFUNCTION OF CERVICAL REGION: ICD-10-CM

## 2023-06-19 DIAGNOSIS — M99.03 SOMATIC DYSFUNCTION OF LUMBAR REGION: ICD-10-CM

## 2023-06-19 DIAGNOSIS — M99.00 SOMATIC DYSFUNCTION OF HEAD REGION: ICD-10-CM

## 2023-06-19 PROCEDURE — 98929 OSTEOPATH MANJ 9-10 REGIONS: CPT | Performed by: PHYSICAL MEDICINE & REHABILITATION

## 2023-06-19 PROCEDURE — 99213 OFFICE O/P EST LOW 20 MIN: CPT | Mod: 25 | Performed by: PHYSICAL MEDICINE & REHABILITATION

## 2023-06-19 ASSESSMENT — PAIN SCALES - GENERAL: PAINLEVEL: MILD PAIN (2)

## 2023-06-19 NOTE — LETTER
6/19/2023         RE: Holly Wood  [address] [facility] [address]        Dear Colleague,    Thank you for referring your patient, Holly Wood, to the [facility]UROS[facility]ERY. Please see a copy of my visit note below.    Assessment/Plan:      Holly was seen today for knee pain.    Diagnoses and all orders for this visit:    Thoracic myofascial strain, initial encounter    Primary osteoarthritis of both knees    Myofascial pain    DDD (degenerative disc disease), lumbar    Somatic dysfunction of head region  -     OSTEOPATHIC MANIP,9-10 BODY REGN    Somatic dysfunction of cervical region  -     OSTEOPATHIC MANIP,9-10 BODY REGN    Somatic dysfunction of rib region  -     OSTEOPATHIC MANIP,9-10 BODY REGN    Somatic dysfunction of upper extremity  -     OSTEOPATHIC MANIP,9-10 BODY REGN    Somatic dysfunction of thoracic region  -     OSTEOPATHIC MANIP,9-10 BODY REGN    Somatic dysfunction of lumbar region  -     OSTEOPATHIC MANIP,9-10 BODY REGN    Somatic dysfunction of sacroiliac joint  -     OSTEOPATHIC MANIP,9-10 BODY REGN    Somatic dysfunction of pelvis region  -     OSTEOPATHIC MANIP,9-10 BODY REGN    Somatic dysfunction of lower extremity  -     OSTEOPATHIC MANIP,9-10 BODY REGN         Assessment: Pleasant 73 year old female  with past medical history significant for macular degeneration with:     1.  Significant left sided thoracolumbar pain after lifting a bag of bird seed at [employer].  Consistent with myofascial strain      2.    Bilateral knee pain consistent with osteoarthritis.  Very mild osteoarthritis on plain films from 2019.  Has responded well to Euflexxa in the past.  Doing very well over 70% improvement with Euflexxa injections of the knees.      3.  Chronic lumbar spine pain  at the lumbosacral junction and sacroiliac joint.  Consistent with facet arthropathy versus SI pain likely setting of mild scoliosis and thoracic kyphosis.  She appears to have a  functional sagittal plane imbalance with plain films revealing 3 cm positive sagittal plane balance.  Did not tolerate trial of duloxetine.   Typically does well with OMT.      4. Sacroiliac joint pain bilaterally.  Contributing to her low back and leg symptoms.     5.  Diffuse whole body pain consistent with myofascial pain likely superimposed polyarticular  osteoarthritis.  Does well with OMT.     6.   Somatic dysfunctions of the cranium, cervical spine, rib cage, upper extremities, thoracic spine, lumbar spine, sacrum, pelvis, lower extremities that contribute to the patient's pain complaints.       Discussion:    1.  We discussed her thoracolumbar pain.  We discussed rest and ice.  She will try this at home.    2.  She can continue to have Euflexxa injections every 6 to 12 months for her knees as needed.  She is doing quite well with this no side effects.    3.  Recommend stopping duloxetine which she already has done due to nausea.    4.  Recommend Osteopathic manipulative medicine for thoracolumbar pain along with whole body myofascial pain as it does typically help her be more active and control pain.  She agrees to proceed.  Please see attached procedure note.    5.  Follow-up 4 to 6 weeks.    It was our pleasure caring for your patient today, if there any questions or concerns please do not hesitate to contact us.      Subjective:   Patient ID: Holly Wood is a 73 year old female.    History of Present Illness: Patient presents for follow-up of multiple pain complaints and has a new complaint of left-sided rib pain today.  She has low back pain bilateral knee pain.  Her low back pain is fairly stable and in fact has improved some along with her knee pain.  She had Euflexxa injections and did quite well with those at least 70% improved with her knees.  This is the best improvement she has had with any Euflexxa injection thus far.    She also has low back pain worse with standing walking lifting better  with medications and with sitting.  Pain is a 7/10 at worst 2/10 today and at best.  Try duloxetine because severe nausea and stopped the medication.  Currently taking gabapentin and aspirin.    2 days ago she was lifting some birdseed at [employer] and began having severe pain over the left rib cage at the thoracolumbar junction on the left around rib 10 and 11.  Feels a muscle pull or weakness in that area.  Constant pain.    Imaging: Scoliosis imaging of the spine reviewed showing 15 degree scoliosis thoracic spine 7 degrees lower lumbar spine 3 cm positive sagittal balance.    Review of Systems: Pertinent positives: None.  Pertinent negatives: No numbness, tingling or weakness.  No bowel or bladder incontinence.  No urinary retention.  No fevers, unintentional weight loss, balance changes, headaches, frequent falling, difficulty swallowing, or coordination difficulties.  All others reviewed are negative.         Past Medical History:   Diagnosis Date     Breast cyst      GERD (gastroesophageal reflux disease)      Insomnia        The following portions of the patient's history were reviewed and updated as appropriate: allergies, current medications, past family history, past medical history, past social history, past surgical history and problem list.           Objective:   Physical Exam:    BP (!) 184/86   Pulse 76   There is no height or weight on file to calculate BMI.      General: Alert and oriented with normal affect. Attention, knowledge, memory, and language are intact. No acute distress.    Respirations: Unlabored. CV: 1+ bilateral lower extremity edema.  Gait:  Nonantalgic  Tenderness to palpation left rib 10 and associated  intercostal muscles.  Sensation is intact to light touch throughout the upper and lower extremities.  Reflexes are   1+ patellar and Achilles      Manual muscle testing reveals:  Right /Left out of 5     5/5 hip flexors  5/5 knee flexors  5/5 knee extensors  5/5 ankle plantar  flexors  5/5 ankle dorsiflexors  5/5  EHL      Structural exam: Cranium: Left cranial torsion, OA sidebent right rotated left cervical spine: C2 rotated right sidebent right C3 rotated left sidebent left, Rib cage: Rib one elevated on the left.  Rib 10 posterior on the left.  Thoracic spine: T1 rotated right sidebent right, T12 rotated left sidebent left. Lumbar spine: L5 rotated right sidebent left. Pelvis: Right innominate upslip, anterior inferior right innominate. Sacrum: Left unilateral sacral shear. Lower extremity: Hypertonic hip flexors and quadriceps bilaterally. Upper Extremities: myofascial restrictions of the bilat upper trap, infraspinatus/parascapular muscles. bilateral glenohumeral resrictions.    Procedure:    After discussing the risks and benefits of osteopathic manipulative medicine, verbal consent was obtained. The somatic dysfunctions listed above were treated with the following techniques: Cranium: Cranial indirect technique, VSD, and muscle energy for the OA. Cervical spine: Muscle energy, still technique, FPR, myofascial release, BLT, and soft tissue techniques. Rib cage: Myofascial release  FPR, mobilization posteriorly with articulation.. Thoracic spine: Myofascial release, BLT.  Lumbar spine: Myofascial release technique. Pelvis: Still technique and Isometrics. Sacrum: Myofascial release.  Lower extremities: Muscle energy.  Upper Exrtremity: MFR, FPR, BLT.  The patient tolerated the procedure well and had improved range of motion in all areas treated prior to leaving the clinic.        Again, thank you for allowing me to participate in the care of your patient.        Sincerely,        Wesley Warren DO

## 2023-06-19 NOTE — PROGRESS NOTES
Assessment/Plan:      Holly was seen today for knee pain.    Diagnoses and all orders for this visit:    Thoracic myofascial strain, initial encounter    Primary osteoarthritis of both knees    Myofascial pain    DDD (degenerative disc disease), lumbar    Somatic dysfunction of head region  -     OSTEOPATHIC MANIP,9-10 BODY REGN    Somatic dysfunction of cervical region  -     OSTEOPATHIC MANIP,9-10 BODY REGN    Somatic dysfunction of rib region  -     OSTEOPATHIC MANIP,9-10 BODY REGN    Somatic dysfunction of upper extremity  -     OSTEOPATHIC MANIP,9-10 BODY REGN    Somatic dysfunction of thoracic region  -     OSTEOPATHIC MANIP,9-10 BODY REGN    Somatic dysfunction of lumbar region  -     OSTEOPATHIC MANIP,9-10 BODY REGN    Somatic dysfunction of sacroiliac joint  -     OSTEOPATHIC MANIP,9-10 BODY REGN    Somatic dysfunction of pelvis region  -     OSTEOPATHIC MANIP,9-10 BODY REGN    Somatic dysfunction of lower extremity  -     OSTEOPATHIC MANIP,9-10 BODY REGN         Assessment: Pleasant 73 year old female  with past medical history significant for macular degeneration with:     1.  Significant left sided thoracolumbar pain after lifting a bag of bird seed at [employer] ([employer]).  Consistent with myofascial strain      2.    Bilateral knee pain consistent with osteoarthritis.  Very mild osteoarthritis on plain films from 2019.  Has responded well to Euflexxa in the past.  Doing very well over 70% improvement with Euflexxa injections of the knees.      3.  Chronic lumbar spine pain  at the lumbosacral junction and sacroiliac joint.  Consistent with facet arthropathy versus SI pain likely setting of mild scoliosis and thoracic kyphosis.  She appears to have a functional sagittal plane imbalance with plain films revealing 3 cm positive sagittal plane balance.  Did not tolerate trial of duloxetine.   Typically does well with OMT.      4. Sacroiliac joint pain bilaterally.  Contributing to her low back and leg  symptoms.     5.  Diffuse whole body pain consistent with myofascial pain likely superimposed polyarticular  osteoarthritis.  Does well with OMT.     6.   Somatic dysfunctions of the cranium, cervical spine, rib cage, upper extremities, thoracic spine, lumbar spine, sacrum, pelvis, lower extremities that contribute to the patient's pain complaints.       Discussion:    1.  We discussed her thoracolumbar pain.  We discussed rest and ice.  She will try this at home.    2.  She can continue to have Euflexxa injections every 6 to 12 months for her knees as needed.  She is doing quite well with this no side effects.    3.  Recommend stopping duloxetine which she already has done due to nausea.    4.  Recommend Osteopathic manipulative medicine for thoracolumbar pain along with whole body myofascial pain as it does typically help her be more active and control pain.  She agrees to proceed.  Please see attached procedure note.    5.  Follow-up 4 to 6 weeks.    It was our pleasure caring for your patient today, if there any questions or concerns please do not hesitate to contact us.      Subjective:   Patient ID: Holly Wood is a 73 year old female.    History of Present Illness: Patient presents for follow-up of multiple pain complaints and has a new complaint of left-sided rib pain today.  She has low back pain bilateral knee pain.  Her low back pain is fairly stable and in fact has improved some along with her knee pain.  She had Euflexxa injections and did quite well with those at least 70% improved with her knees.  This is the best improvement she has had with any Euflexxa injection thus far.    She also has low back pain worse with standing walking lifting better with medications and with sitting.  Pain is a 7/10 at worst 2/10 today and at best.  Try duloxetine because severe nausea and stopped the medication.  Currently taking gabapentin and aspirin.    2 days ago she was lifting some birdseed at [employer] and  began having severe pain over the left rib cage at the thoracolumbar junction on the left around rib 10 and 11.  Feels a muscle pull or weakness in that area.  Constant pain.    Imaging: Scoliosis imaging of the spine reviewed showing 15 degree scoliosis thoracic spine 7 degrees lower lumbar spine 3 cm positive sagittal balance.    Review of Systems: Pertinent positives: None.  Pertinent negatives: No numbness, tingling or weakness.  No bowel or bladder incontinence.  No urinary retention.  No fevers, unintentional weight loss, balance changes, headaches, frequent falling, difficulty swallowing, or coordination difficulties.  All others reviewed are negative.         Past Medical History:   Diagnosis Date     Breast cyst      GERD (gastroesophageal reflux disease)      Insomnia        The following portions of the patient's history were reviewed and updated as appropriate: allergies, current medications, past family history, past medical history, past social history, past surgical history and problem list.           Objective:   Physical Exam:    BP (!) 184/86   Pulse 76   There is no height or weight on file to calculate BMI.      General: Alert and oriented with normal affect. Attention, knowledge, memory, and language are intact. No acute distress.    Respirations: Unlabored. CV: 1+ bilateral lower extremity edema.  Gait:  Nonantalgic  Tenderness to palpation left rib 10 and associated  intercostal muscles.  Sensation is intact to light touch throughout the upper and lower extremities.  Reflexes are   1+ patellar and Achilles      Manual muscle testing reveals:  Right /Left out of 5     5/5 hip flexors  5/5 knee flexors  5/5 knee extensors  5/5 ankle plantar flexors  5/5 ankle dorsiflexors  5/5  EHL      Structural exam: Cranium: Left cranial torsion, OA sidebent right rotated left cervical spine: C2 rotated right sidebent right C3 rotated left sidebent left, Rib cage: Rib one elevated on the left.  Rib 10  posterior on the left.  Thoracic spine: T1 rotated right sidebent right, T12 rotated left sidebent left. Lumbar spine: L5 rotated right sidebent left. Pelvis: Right innominate upslip, anterior inferior right innominate. Sacrum: Left unilateral sacral shear. Lower extremity: Hypertonic hip flexors and quadriceps bilaterally. Upper Extremities: myofascial restrictions of the bilat upper trap, infraspinatus/parascapular muscles. bilateral glenohumeral resrictions.    Procedure:    After discussing the risks and benefits of osteopathic manipulative medicine, verbal consent was obtained. The somatic dysfunctions listed above were treated with the following techniques: Cranium: Cranial indirect technique, VSD, and muscle energy for the OA. Cervical spine: Muscle energy, still technique, FPR, myofascial release, BLT, and soft tissue techniques. Rib cage: Myofascial release  FPR, mobilization posteriorly with articulation.. Thoracic spine: Myofascial release, BLT.  Lumbar spine: Myofascial release technique. Pelvis: Still technique and Isometrics. Sacrum: Myofascial release.  Lower extremities: Muscle energy.  Upper Exrtremity: MFR, FPR, BLT.  The patient tolerated the procedure well and had improved range of motion in all areas treated prior to leaving the clinic.

## 2023-07-22 ENCOUNTER — LAB REQUISITION (OUTPATIENT)
Dept: LAB | Facility: CLINIC | Age: 73
End: 2023-07-22

## 2023-07-22 DIAGNOSIS — R30.0 DYSURIA: ICD-10-CM

## 2023-07-22 LAB
BACTERIAL VAGINOSIS VAG-IMP: NEGATIVE
CANDIDA DNA VAG QL NAA+PROBE: DETECTED
CANDIDA GLABRATA / CANDIDA KRUSEI DNA: NOT DETECTED
T VAGINALIS DNA VAG QL NAA+PROBE: NOT DETECTED

## 2023-07-22 PROCEDURE — 87801 DETECT AGNT MULT DNA AMPLI: CPT | Performed by: PHYSICIAN ASSISTANT

## 2023-07-27 ENCOUNTER — OFFICE VISIT (OUTPATIENT)
Dept: PHYSICAL MEDICINE AND REHAB | Facility: CLINIC | Age: 73
End: 2023-07-27
Payer: COMMERCIAL

## 2023-07-27 VITALS — HEART RATE: 68 BPM | DIASTOLIC BLOOD PRESSURE: 72 MMHG | SYSTOLIC BLOOD PRESSURE: 162 MMHG

## 2023-07-27 DIAGNOSIS — M79.18 MYOFASCIAL PAIN: ICD-10-CM

## 2023-07-27 DIAGNOSIS — M17.0 PRIMARY OSTEOARTHRITIS OF BOTH KNEES: ICD-10-CM

## 2023-07-27 DIAGNOSIS — M54.50 LUMBAR SPINE PAIN: Primary | ICD-10-CM

## 2023-07-27 DIAGNOSIS — M51.369 DDD (DEGENERATIVE DISC DISEASE), LUMBAR: ICD-10-CM

## 2023-07-27 DIAGNOSIS — M99.04 SOMATIC DYSFUNCTION OF SACROILIAC JOINT: ICD-10-CM

## 2023-07-27 DIAGNOSIS — M99.06 SOMATIC DYSFUNCTION OF LOWER EXTREMITY: ICD-10-CM

## 2023-07-27 DIAGNOSIS — M99.02 SOMATIC DYSFUNCTION OF THORACIC REGION: ICD-10-CM

## 2023-07-27 DIAGNOSIS — M99.05 SOMATIC DYSFUNCTION OF PELVIS REGION: ICD-10-CM

## 2023-07-27 DIAGNOSIS — M99.03 SOMATIC DYSFUNCTION OF LUMBAR REGION: ICD-10-CM

## 2023-07-27 PROCEDURE — 99213 OFFICE O/P EST LOW 20 MIN: CPT | Mod: 25 | Performed by: PHYSICAL MEDICINE & REHABILITATION

## 2023-07-27 PROCEDURE — 98927 OSTEOPATH MANJ 5-6 REGIONS: CPT | Performed by: PHYSICAL MEDICINE & REHABILITATION

## 2023-07-27 ASSESSMENT — PAIN SCALES - GENERAL: PAINLEVEL: MILD PAIN (2)

## 2023-07-27 NOTE — LETTER
7/27/2023         RE: Holly Wood  [address] [id number]        Dear Colleague,    Thank you for referring your patient, Holly Wood, to the [facility]. Please see a copy of my visit note below.    Assessment/Plan:      Holly was seen today for back pain.    Diagnoses and all orders for this visit:    Lumbar spine pain    Myofascial pain    DDD (degenerative disc disease), lumbar    Primary osteoarthritis of both knees    Somatic dysfunction of thoracic region  -     OSTEOPATHIC MANIP,5-6 BODY REGN    Somatic dysfunction of lumbar region  -     OSTEOPATHIC MANIP,5-6 BODY REGN    Somatic dysfunction of sacroiliac joint  -     OSTEOPATHIC MANIP,5-6 BODY REGN    Somatic dysfunction of pelvis region  -     OSTEOPATHIC MANIP,5-6 BODY REGN    Somatic dysfunction of lower extremity  -     OSTEOPATHIC MANIP,5-6 BODY REGN         Assessment: Pleasant 73 year old female with past medical history significant for macular degeneration with:     1.  Chronic lumbar spine pain at the lumbosacral junction and sacroiliac joint.  Consistent with facet arthropathy versus SI pain likely setting of mild scoliosis and thoracic kyphosis.  She appears to have a functional sagittal plane imbalance with plain films revealing 3 cm positive sagittal plane balance.  Did not tolerate trial of duloxetine.  Has done well with Osteopathic manipulative medicine.  Continues to do well after Euflexxa but still having some issues.     2.    Bilateral knee pain consistent with osteoarthritis.  Very mild osteoarthritis on plain films from 2019.  Has responded well to Euflexxa in the past.  Doing very well over 70% improvement with Euflexxa injections of the knees.       3.  Diffuse whole body pain consistent with myofascial pain likely superimposed polyarticular  osteoarthritis.  Does well with OMT.     6.   Somatic dysfunctions of the  thoracic spine, lumbar spine, sacrum, pelvis, lower  extremities that contribute to the patient's pain complaints.      Discussion:    1.  We discussed the osteoarthritis of the knees is her biggest issue along with the low back.  We discussed over-the-counter options along with further imaging orthopedic evaluation for her knees or potential for cool RF.  At this point she wants to continue with home exercise and work on weight loss.    2.  Try turmeric over-the-counter for an anti-inflammatory.    3.  Can try TENS unit over-the-counter for back pain.    4.  She does well with Osteopathic manipulative medicine would like OMT treatment.  Please see attached procedure note she agrees to proceed.    5.  Follow-up as needed      It was our pleasure caring for your patient today, if there any questions or concerns please do not hesitate to contact us.      Subjective:   Patient ID: Holly Wood is a 73 year old female.    History of Present Illness:Patient presents for follow-up of low back pain as well as bilateral knee pain.   continues to have significant low back pain lumbosacral junction in the buttock.  Has bilateral knee pain lateral knees worse with standing and walking pains better with medications and sitting.  Her knees are still doing well after the injection somewhat but still has issues with prolonged walking.  She is planning on going on a trip in October and is working on weight loss and started working out at the [employer]  She goes about 2 to 3 days a week for [employer] sneakers and also has a .  She feels that she is starting to get stronger and hopeful that this will help with her back and knees.  Her pain is a 7/10 at worst 2/10 today and at best.  Osteopathic manipulative medicine [facility].  She is also on gabapentin and aspirin.  Her neck is fairly stable at this time and wants to focus on the low back today.  Her increased back pain after last visit from lifting birdseed has returned to baseline.    Imaging: Plain film report of the  knees from 2019 from [facility] reveals severe lateral joint space narrowing bilateral knees.    Review of Systems: Pertinent positives: None.  Pertinent negatives: No numbness, tingling or weakness.  No bowel or bladder incontinence.  No urinary retention.  No fevers, unintentional weight loss, balance changes, headaches, frequent falling, difficulty swallowing, or coordination difficulties.  All others reviewed are negative.       Past Medical History:   Diagnosis Date     Breast cyst      GERD (gastroesophageal reflux disease)      Insomnia        The following portions of the patient's history were reviewed and updated as appropriate: allergies, current medications, past family history, past medical history, past social history, past surgical history and problem list.           Objective:   Physical Exam:    BP (!) 162/72   Pulse 68   There is no height or weight on file to calculate BMI.      General: Alert and oriented with normal affect. Attention, knowledge, memory, and language are intact. No acute distress.    CV: No lower extremity edema.  Skin: No rashes seen.    Gait:  Nonantalgic, cautious, tenderness over the lateral joint space of the knees bilaterally.    Sensation is intact to light touch throughout the  lower extremities.  Reflexes are   2+ patellar and Achilles with downgoing toes.    Manual muscle testing reveals:  Right /Left out of 5     5/5 hip flexors  5/5 knee flexors  5/5 knee extensors  5/5 ankle plantar flexors  5/5 ankle dorsiflexors  5/5  EHL        Structural exam:  Thoracic spine:   T12 rotated left sidebent left. Lumbar spine: L5 rotated right sidebent left. Pelvis: Right innominate upslip, anterior inferior right innominate. Sacrum: Left unilateral sacral shear lower extremity: Hypertonic hip flexors, quadriceps bilaterally.      Procedure:    After discussing the risks and benefits of osteopathic manipulative medicine, verbal consent was obtained. The somatic dysfunctions  listed above were treated with the following techniques:  Thoracic spine: Myofascial release  Lumbar spine: Myofascial release technique. Pelvis: Still technique and Isometrics. Sacrum: Myofascial release.  Lower extremities: Muscle energy.    The patient tolerated the procedure well and had improved range of motion in all areas treated prior to leaving the clinic.            Again, thank you for allowing me to participate in the care of your patient.        Sincerely,        Wesley Warren, DO

## 2023-07-27 NOTE — PROGRESS NOTES
Assessment/Plan:      Holly was seen today for back pain.    Diagnoses and all orders for this visit:    Lumbar spine pain    Myofascial pain    DDD (degenerative disc disease), lumbar    Primary osteoarthritis of both knees    Somatic dysfunction of thoracic region  -     OSTEOPATHIC MANIP,5-6 BODY REGN    Somatic dysfunction of lumbar region  -     OSTEOPATHIC MANIP,5-6 BODY REGN    Somatic dysfunction of sacroiliac joint  -     OSTEOPATHIC MANIP,5-6 BODY REGN    Somatic dysfunction of pelvis region  -     OSTEOPATHIC MANIP,5-6 BODY REGN    Somatic dysfunction of lower extremity  -     OSTEOPATHIC MANIP,5-6 BODY REGN         Assessment: Pleasant 73 year old female with past medical history significant for macular degeneration with:     1.  Chronic lumbar spine pain at the lumbosacral junction and sacroiliac joint.  Consistent with facet arthropathy versus SI pain likely setting of mild scoliosis and thoracic kyphosis.  She appears to have a functional sagittal plane imbalance with plain films revealing 3 cm positive sagittal plane balance.  Did not tolerate trial of duloxetine.  Has done well with Osteopathic manipulative medicine.  Continues to do well after Euflexxa but still having some issues.     2.    Bilateral knee pain consistent with osteoarthritis.  Very mild osteoarthritis on plain films from 2019.  Has responded well to Euflexxa in the past.  Doing very well over 70% improvement with Euflexxa injections of the knees.       3.  Diffuse whole body pain consistent with myofascial pain likely superimposed polyarticular  osteoarthritis.  Does well with OMT.     6.   Somatic dysfunctions of the  thoracic spine, lumbar spine, sacrum, pelvis, lower extremities that contribute to the patient's pain complaints.      Discussion:    1.  We discussed the osteoarthritis of the knees is her biggest issue along with the low back.  We discussed over-the-counter options along with further imaging orthopedic evaluation  for her knees or potential for cool RF.  At this point she wants to continue with home exercise and work on weight loss.    2.  Try turmeric over-the-counter for an anti-inflammatory.    3.  Can try TENS unit over-the-counter for back pain.    4.  She does well with Osteopathic manipulative medicine would like OMT treatment.  Please see attached procedure note she agrees to proceed.    5.  Follow-up as needed      It was our pleasure caring for your patient today, if there any questions or concerns please do not hesitate to contact us.      Subjective:   Patient ID: Holly Wood is a 73 year old female.    History of Present Illness:Patient presents for follow-up of low back pain as well as bilateral knee pain.   continues to have significant low back pain lumbosacral junction in the buttock.  Has bilateral knee pain lateral knees worse with standing and walking pains better with medications and sitting.  Her knees are still doing well after the injection somewhat but still has issues with prolonged walking.  She is planning on going on a trip in October and is working on weight loss and started working out at the [employer].  She goes about 2 to 3 days a week for [employer] sneakers and also has a .  She feels that she is starting to get stronger and hopeful that this will help with her back and knees.  Her pain is a 7/10 at worst 2/10 today and at best.  Osteopathic manipulative medicine [facility].  She is also on gabapentin and aspirin.  Her neck is fairly stable at this time and wants to focus on the low back today.  Her increased back pain after last visit from lifting birdseed has returned to baseline.    Imaging: Plain film report of the knees from 2019 from [facility] reveals severe lateral joint space narrowing bilateral knees.    Review of Systems: Pertinent positives: None.  Pertinent negatives: No numbness, tingling or weakness.  No bowel or bladder incontinence.  No urinary retention.  No  fevers, unintentional weight loss, balance changes, headaches, frequent falling, difficulty swallowing, or coordination difficulties.  All others reviewed are negative.       Past Medical History:   Diagnosis Date    Breast cyst     GERD (gastroesophageal reflux disease)     Insomnia        The following portions of the patient's history were reviewed and updated as appropriate: allergies, current medications, past family history, past medical history, past social history, past surgical history and problem list.           Objective:   Physical Exam:    BP (!) 162/72   Pulse 68   There is no height or weight on file to calculate BMI.      General: Alert and oriented with normal affect. Attention, knowledge, memory, and language are intact. No acute distress.    CV: No lower extremity edema.  Skin: No rashes seen.    Gait:  Nonantalgic, cautious, tenderness over the lateral joint space of the knees bilaterally.    Sensation is intact to light touch throughout the  lower extremities.  Reflexes are   2+ patellar and Achilles with downgoing toes.    Manual muscle testing reveals:  Right /Left out of 5     5/5 hip flexors  5/5 knee flexors  5/5 knee extensors  5/5 ankle plantar flexors  5/5 ankle dorsiflexors  5/5  EHL        Structural exam:  Thoracic spine:   T12 rotated left sidebent left. Lumbar spine: L5 rotated right sidebent left. Pelvis: Right innominate upslip, anterior inferior right innominate. Sacrum: Left unilateral sacral shear lower extremity: Hypertonic hip flexors, quadriceps bilaterally.      Procedure:    After discussing the risks and benefits of osteopathic manipulative medicine, verbal consent was obtained. The somatic dysfunctions listed above were treated with the following techniques:  Thoracic spine: Myofascial release  Lumbar spine: Myofascial release technique. Pelvis: Still technique and Isometrics. Sacrum: Myofascial release.  Lower extremities: Muscle energy.    The patient tolerated the  procedure well and had improved range of motion in all areas treated prior to leaving the clinic.

## 2023-09-06 ENCOUNTER — TELEPHONE (OUTPATIENT)
Dept: PHYSICAL MEDICINE AND REHAB | Facility: CLINIC | Age: 73
End: 2023-09-06
Payer: COMMERCIAL

## 2023-09-06 DIAGNOSIS — M17.9 OA (OSTEOARTHRITIS) OF KNEE: Primary | ICD-10-CM

## 2023-09-06 NOTE — TELEPHONE ENCOUNTER
Call placed to pt to discuss. Unable to leave message as phone kept ringing and ringing for several minutes.

## 2023-09-06 NOTE — TELEPHONE ENCOUNTER
Reason for call:  Other   Patient called regarding (reason for call): appointment and call back  Additional comments: Pt called multiple times trying to get a hold of Dr. Warren or Dr. Carmona nurse to request a new order for her to repeat an Inj. For her knees. Please review and call pt back for advice.     Phone number to reach patient:  Home number on file [phone] (home)    Best Time:  Any    Can we leave a detailed message on this number?  YES    Ruby Cid      [facility]  Pain Management

## 2023-09-07 ENCOUNTER — LAB REQUISITION (OUTPATIENT)
Dept: LAB | Facility: CLINIC | Age: 73
End: 2023-09-07

## 2023-09-07 DIAGNOSIS — R30.0 DYSURIA: ICD-10-CM

## 2023-09-07 PROCEDURE — 87086 URINE CULTURE/COLONY COUNT: CPT | Performed by: FAMILY MEDICINE

## 2023-09-09 LAB — BACTERIA UR CULT: NORMAL

## 2023-09-11 NOTE — TELEPHONE ENCOUNTER
I agree with repeat Euflexxa injections if it has been 6 months since her last injection and insurance approves.

## 2023-09-11 NOTE — TELEPHONE ENCOUNTER
Phone call to patient to discuss. Patient reporting her knees have started to bother her again as she has been working out at the . Reports she had 50% relief with the previous injections. Relief is now 30%. Hoping to get in for injections with Dr. Beltre and not have to come for appointment with Wesley Warren DO first.     Chart reviewed. Per office note of June 19, 2023 noted that she could repeat Eufflexa injections every 6-12 months. Order placed for repeat injections. She is aware the earliest she could have them is end of October. Transferred to scheduling to make appointment.

## 2023-09-28 ENCOUNTER — LAB REQUISITION (OUTPATIENT)
Dept: LAB | Facility: CLINIC | Age: 73
End: 2023-09-28

## 2023-09-28 PROCEDURE — 87186 SC STD MICRODIL/AGAR DIL: CPT | Performed by: FAMILY MEDICINE

## 2023-09-30 LAB — BACTERIA UR CULT: ABNORMAL

## 2023-10-03 NOTE — PROGRESS NOTES
Assessment:   Holly Wood (AKKALPANA Serrato) is a 68 y.o. y.o. female with past medical history significant for macular degeneration who presents today for follow-up regarding neck pain thought to be from cervical facet joint syndrome and osteopathic somatic dysfunction.  Patient's neck pain is under good control.  She continues to have chronic left-sided low back pain and left buttock pain thought to be due to myofascial pain as well as burning sensation/pain in both of her knees though she notes that these are definitely improved with taking gabapentin 300 mg at bedtime.  She continues to have multiple areas of osteopathic somatic dysfunction as listed below.       Plan:     A shared decision making plan was used.  The patient's values and choices were respected.  The following represents what was discussed and decided upon by the physician and the patient.      1.  DIAGNOSTIC TESTS:  No further diagnostic tests are necessary at this time.    2.  PHYSICAL THERAPY: No further physical therapy at this time.  The patient has previously completed physical therapy and is encouraged to continue doing the home exercise program daily.    3.  MEDICATIONS: The patient can continue to take the gabapentin 300 mg at bedtime.  She has found that this dose works well to control her pain so there is no need for her to further increase the medication at this time.  4.  INTERVENTIONS:  Osteopathic manipulation was performed to 7 areas today.  The patient tolerated the treatment well and reported relief immediately afterwards.   Please see below for the osteopathic manipulation that was performed today.  5.  PATIENT EDUCATION:    - The patient was advised to rest today and drink plenty of water.  Normal activities can be resumed as tolerated tomorrow.    - The patient was told that soreness following the treatment is normal and should improve within a few days.  If the soreness is concerning, the clinic should be notified so further  instructions can be given.  6.  FOLLOW-UP: The patient is asked to follow-up proximally 4 weeks.  If she has any questions, concerns, or any significant worsening of her pain prior to that time she is encouraged to contact the clinic..     Subjective:     Holly Wood (AKA Ama) is a 68 y.o. female who presents today for follow-up regarding chronic neck pain as well as chronic low back pain with left buttock pain.  The patient was also complaining of a burning sensation in her knees at the last visit on November 9 and she was started on gabapentin 300 mg at bedtime at that time.  The patient reports that the gabapentin has been significantly helpful.  She reports that it has significantly helped with not only the burning sensation in the knees, but also with the back pain.  She reports that she is now able to sleep through the night.  She states that it lasts into the morning and that she is able to do her exercises without significant pain.  She is quite pleased overall with how she is doing.  States that she really could not be happier at this time.  She rates her pain at a 2 out of 10.  At best it is a 2 out of 10.  At worst it is an 8 out of 10.  His pain is worse with increased activities.  She does feel better with the gabapentin and with osteopathic manipulation.  She denies any new symptoms at this time.  She has no radicular symptoms in the arms or the legs.    Past medical history is reviewed and is unchanged in the interim.    Family history is reviewed and is unchanged in the interim.    Review of Systems:  Negative for numbness/tingling, weakness, bowel/bladder dysfunction, foot drop, headache, dizziness, nausea/vomiting, blurred vision, balance changes.     Objective:   CONSTITUTIONAL:  Vital signs as above.  No acute distress.  The patient is well nourished and well groomed.    PSYCHIATRIC:  The patient is awake, alert, oriented to person, place and time.  The patient is answering questions  Detail Level: Detailed appropriately with clear speech.  Normal affect.  SKIN:  Skin over the face, posterior torso, bilateral upper and lower extremities is clean, dry, intact without rashes.  MUSCULOSKELETAL:  Gait is non-antalgic.  The patient is able to transfer independently.  Positive for tenderness over the lumbar paraspinal muscles and over the cervical paraspinal muscles bilaterally.    OSTEOPATHIC STRUCTURAL EXAM:  Negative standing flexion test.  Symmetric iliac crests.  Lumbar spine: L5 flexed, rotated/side bent right  Sacrum: Left on left forward sacral torsion  Innominate: Anterior/inferior right, posterior/superior left  Thoracic spine: T10-12 flexed, rotated/side bent right  Rib cage: First rib elevated on the left, decreased motion over ribs 3 through 7 on the left.  Cervical spine: C3-5 flexed, rotated/side bent right  Head/OA joint: Side bent right/rotated left    OMT:  TREATMENTS IN PARENTHESES  Lumbar spine: L5 flexed, rotated/side bent right (Myofascial release, patient prone).  Sacrum: Left on left forward sacral torsion (Myofascial release, patient prone).  Innominate: Anterior/inferior right, posterior/superior left  (Muscle energy with the patient supine).  Thoracic spine: T10-12 flexed, rotated/side bent right from myofascial release with patient seated)  Rib cage: First rib elevated on the left, (Myofascial release, patient supine) decreased motion over ribs 3 through 7 on the left.  (Myofascial release, patient in the lateral recumbent position).  Cervical spine: C3-5 flexed, rotated/side bent right (myofascial release with the patient supine using an anterior very gentle Pressure)  Head/OA joint: Side bent right/rotated left (myofascial release with patient supine using a anterior very gentle pressure)

## 2023-10-12 ENCOUNTER — ANCILLARY PROCEDURE (OUTPATIENT)
Dept: MAMMOGRAPHY | Facility: HOSPITAL | Age: 73
End: 2023-10-12
Attending: FAMILY MEDICINE
Payer: COMMERCIAL

## 2023-10-12 DIAGNOSIS — Z12.31 VISIT FOR SCREENING MAMMOGRAM: ICD-10-CM

## 2023-10-12 PROCEDURE — 77067 SCR MAMMO BI INCL CAD: CPT

## 2023-11-14 ENCOUNTER — LAB REQUISITION (OUTPATIENT)
Dept: LAB | Facility: CLINIC | Age: 73
End: 2023-11-14

## 2023-11-14 DIAGNOSIS — R42 DIZZINESS AND GIDDINESS: ICD-10-CM

## 2023-11-14 DIAGNOSIS — I10 ESSENTIAL (PRIMARY) HYPERTENSION: ICD-10-CM

## 2023-11-14 PROCEDURE — 82306 VITAMIN D 25 HYDROXY: CPT | Performed by: NURSE PRACTITIONER

## 2023-11-14 PROCEDURE — 80061 LIPID PANEL: CPT | Performed by: NURSE PRACTITIONER

## 2023-11-14 PROCEDURE — 83735 ASSAY OF MAGNESIUM: CPT | Performed by: NURSE PRACTITIONER

## 2023-11-14 PROCEDURE — 80053 COMPREHEN METABOLIC PANEL: CPT | Performed by: NURSE PRACTITIONER

## 2023-11-15 LAB
ALBUMIN SERPL BCG-MCNC: 4.1 G/DL (ref 3.5–5.2)
ALP SERPL-CCNC: 99 U/L (ref 40–150)
ALT SERPL W P-5'-P-CCNC: 25 U/L (ref 0–50)
ANION GAP SERPL CALCULATED.3IONS-SCNC: 10 MMOL/L (ref 7–15)
AST SERPL W P-5'-P-CCNC: 26 U/L (ref 0–45)
BILIRUB SERPL-MCNC: 0.2 MG/DL
BUN SERPL-MCNC: 20.2 MG/DL (ref 8–23)
CALCIUM SERPL-MCNC: 9.9 MG/DL (ref 8.8–10.2)
CHLORIDE SERPL-SCNC: 104 MMOL/L (ref 98–107)
CHOLEST SERPL-MCNC: 198 MG/DL
CREAT SERPL-MCNC: 0.74 MG/DL (ref 0.51–0.95)
DEPRECATED HCO3 PLAS-SCNC: 25 MMOL/L (ref 22–29)
EGFRCR SERPLBLD CKD-EPI 2021: 84 ML/MIN/1.73M2
GLUCOSE SERPL-MCNC: 98 MG/DL (ref 70–99)
HDLC SERPL-MCNC: 53 MG/DL
LDLC SERPL CALC-MCNC: 115 MG/DL
MAGNESIUM SERPL-MCNC: 2.4 MG/DL (ref 1.7–2.3)
NONHDLC SERPL-MCNC: 145 MG/DL
POTASSIUM SERPL-SCNC: 5.1 MMOL/L (ref 3.4–5.3)
PROT SERPL-MCNC: 7.1 G/DL (ref 6.4–8.3)
SODIUM SERPL-SCNC: 139 MMOL/L (ref 135–145)
TRIGL SERPL-MCNC: 150 MG/DL
VIT D+METAB SERPL-MCNC: 36 NG/ML (ref 20–50)

## 2023-11-20 NOTE — ADDENDUM NOTE
Encounter addended by: Harmony James, PT on: 12/13/2022 12:22 PM   Actions taken: Pend clinical note, Flowsheet accepted, Clinical Note Signed, Episode resolved
21-Nov-2023 00:47

## 2023-12-01 ENCOUNTER — HOSPITAL ENCOUNTER (OUTPATIENT)
Dept: BONE DENSITY | Facility: HOSPITAL | Age: 73
Discharge: HOME | End: 2023-12-01
Attending: NURSE PRACTITIONER | Admitting: NURSE PRACTITIONER
Payer: COMMERCIAL

## 2023-12-01 DIAGNOSIS — E89.40 SURGICAL MENOPAUSE, ASYMPTOMATIC: ICD-10-CM

## 2023-12-01 PROCEDURE — 77080 DXA BONE DENSITY AXIAL: CPT

## 2023-12-01 PROCEDURE — 77081 DXA BONE DENSITY APPENDICULR: CPT | Mod: XU

## 2023-12-10 DIAGNOSIS — M25.562 PAIN IN BOTH KNEES, UNSPECIFIED CHRONICITY: ICD-10-CM

## 2023-12-10 DIAGNOSIS — M25.561 PAIN IN BOTH KNEES, UNSPECIFIED CHRONICITY: ICD-10-CM

## 2023-12-10 DIAGNOSIS — M79.18 LEFT BUTTOCK PAIN: ICD-10-CM

## 2023-12-11 RX ORDER — GABAPENTIN 300 MG/1
CAPSULE ORAL
Qty: 270 CAPSULE | Refills: 1 | Status: SHIPPED | OUTPATIENT
Start: 2023-12-11

## 2024-01-09 ENCOUNTER — RADIOLOGY INJECTION OFFICE VISIT (OUTPATIENT)
Dept: PHYSICAL MEDICINE AND REHAB | Facility: CLINIC | Age: 74
End: 2024-01-09
Payer: COMMERCIAL

## 2024-01-09 VITALS
SYSTOLIC BLOOD PRESSURE: 134 MMHG | OXYGEN SATURATION: 96 % | TEMPERATURE: 97.7 F | HEART RATE: 88 BPM | DIASTOLIC BLOOD PRESSURE: 91 MMHG

## 2024-01-09 DIAGNOSIS — M17.9 OA (OSTEOARTHRITIS) OF KNEE: ICD-10-CM

## 2024-01-09 PROCEDURE — 20611 DRAIN/INJ JOINT/BURSA W/US: CPT | Mod: 50 | Performed by: PAIN MEDICINE

## 2024-01-09 RX ORDER — CELECOXIB 100 MG/1
CAPSULE ORAL
COMMUNITY
Start: 2023-11-28 | End: 2024-02-26

## 2024-01-09 RX ORDER — HYALURONATE SODIUM 10 MG/ML
SYRINGE (ML) INTRAARTICULAR
Status: COMPLETED | OUTPATIENT
Start: 2024-01-09 | End: 2024-01-09

## 2024-01-09 RX ORDER — LIDOCAINE HYDROCHLORIDE 10 MG/ML
INJECTION, SOLUTION EPIDURAL; INFILTRATION; INTRACAUDAL; PERINEURAL
Status: COMPLETED | OUTPATIENT
Start: 2024-01-09 | End: 2024-01-09

## 2024-01-09 RX ORDER — LOSARTAN POTASSIUM 25 MG/1
1 TABLET ORAL DAILY
COMMUNITY

## 2024-01-09 RX ADMIN — LIDOCAINE HYDROCHLORIDE 1 ML: 10 INJECTION, SOLUTION EPIDURAL; INFILTRATION; INTRACAUDAL; PERINEURAL at 13:46

## 2024-01-09 RX ADMIN — Medication 40 MG: at 13:46

## 2024-01-09 ASSESSMENT — PAIN SCALES - GENERAL
PAINLEVEL: SEVERE PAIN (7)
PAINLEVEL: SEVERE PAIN (7)

## 2024-01-09 NOTE — PATIENT INSTRUCTIONS
DISCHARGE INSTRUCTIONS    During office hours (8:00 a.m.- 4:00 p.m.) questions or concerns may be answered  by calling [facility] Nurses at  [phone].  Messages received after hours will be returned the following business day.      In the case of an emergency, please dial 911 or seek assistance at the nearest Emergency Room/Urgent Care facility.     All Patients:    You may experience an increase in your symptoms for the first 2 days (It may take 2-3 weeks for the medication to have maximum effect).    You may use ice on the injection site, as frequently as 20 minutes each hour if needed.    You may take your pain medicine.    You may continue taking your regular medication after your injection.    You may shower. No swimming, tub bath or hot tub for 48 hours.  You may remove your bandaid/bandage as soon as you are home.    You may resume light activities, as tolerated.    Resume your usual diet as tolerated.      POSSIBLE EUFLEXXA SIDE EFFECTS    -If you experience these symptoms, it should only last for a short period    Swelling at injection site  Swelling of the knee joint              Bruising under the skin     Decreased appetite  Headache  Itching  Nausea  Diarrhea  Irritation to the stomach or intestines         POSSIBLE PROCEDURE SIDE EFFECTS  -Call the Spine Center if you are concerned  Increased Pain           Increased numbness/tingling      Nausea/Vomiting          Bruising/bleeding at site      Redness or swelling                                              Difficulty walking      Weakness           Fever greater than 100.5

## 2024-01-16 ENCOUNTER — RADIOLOGY INJECTION OFFICE VISIT (OUTPATIENT)
Dept: PHYSICAL MEDICINE AND REHAB | Facility: CLINIC | Age: 74
End: 2024-01-16
Payer: COMMERCIAL

## 2024-01-16 VITALS
OXYGEN SATURATION: 97 % | DIASTOLIC BLOOD PRESSURE: 80 MMHG | SYSTOLIC BLOOD PRESSURE: 148 MMHG | WEIGHT: 246 LBS | TEMPERATURE: 98 F | BODY MASS INDEX: 46.48 KG/M2 | HEART RATE: 90 BPM

## 2024-01-16 DIAGNOSIS — M17.9 OA (OSTEOARTHRITIS) OF KNEE: ICD-10-CM

## 2024-01-16 PROCEDURE — 20611 DRAIN/INJ JOINT/BURSA W/US: CPT | Mod: 50 | Performed by: PAIN MEDICINE

## 2024-01-16 RX ORDER — LIDOCAINE HYDROCHLORIDE 10 MG/ML
INJECTION, SOLUTION EPIDURAL; INFILTRATION; INTRACAUDAL; PERINEURAL
Status: COMPLETED | OUTPATIENT
Start: 2024-01-16 | End: 2024-01-16

## 2024-01-16 RX ORDER — HYALURONATE SODIUM 10 MG/ML
SYRINGE (ML) INTRAARTICULAR
Status: COMPLETED | OUTPATIENT
Start: 2024-01-16 | End: 2024-01-16

## 2024-01-16 RX ADMIN — LIDOCAINE HYDROCHLORIDE 3 ML: 10 INJECTION, SOLUTION EPIDURAL; INFILTRATION; INTRACAUDAL; PERINEURAL at 14:35

## 2024-01-16 RX ADMIN — Medication 20 MG: at 14:38

## 2024-01-16 RX ADMIN — Medication 20 MG: at 14:39

## 2024-01-16 ASSESSMENT — PAIN SCALES - GENERAL: PAINLEVEL: SEVERE PAIN (6)

## 2024-01-16 NOTE — PATIENT INSTRUCTIONS
DISCHARGE INSTRUCTIONS    During office hours (8:00 a.m.- 4:00 p.m.) questions or concerns may be answered  by calling [facility] Nurses at  [phone].  Messages received after hours will be returned the following business day.      In the case of an emergency, please dial 911 or seek assistance at the nearest Emergency Room/Urgent Care facility.     All Patients:    You may experience an increase in your symptoms for the first 2 days (It may take 2-3 weeks for the medication to have maximum effect).    You may use ice on the injection site, as frequently as 20 minutes each hour if needed.    You may take your pain medicine.    You may continue taking your regular medication after your injection.    You may shower. No swimming, tub bath or hot tub for 48 hours.  You may remove your bandaid/bandage as soon as you are home.    You may resume light activities, as tolerated.    Resume your usual diet as tolerated.      POSSIBLE EUFLEXXA SIDE EFFECTS    -If you experience these symptoms, it should only last for a short period    Swelling at injection site  Swelling of the knee joint              Bruising under the skin     Decreased appetite  Headache  Itching  Nausea  Diarrhea  Irritation to the stomach or intestines         POSSIBLE PROCEDURE SIDE EFFECTS  -Call the Spine Center if you are concerned  Increased Pain           Increased numbness/tingling      Nausea/Vomiting          Bruising/bleeding at site      Redness or swelling                                              Difficulty walking      Weakness           Fever greater than 100.5

## 2024-01-23 ENCOUNTER — RADIOLOGY INJECTION OFFICE VISIT (OUTPATIENT)
Dept: PHYSICAL MEDICINE AND REHAB | Facility: CLINIC | Age: 74
End: 2024-01-23
Payer: COMMERCIAL

## 2024-01-23 VITALS
DIASTOLIC BLOOD PRESSURE: 90 MMHG | SYSTOLIC BLOOD PRESSURE: 137 MMHG | HEART RATE: 83 BPM | OXYGEN SATURATION: 98 % | TEMPERATURE: 97.7 F

## 2024-01-23 DIAGNOSIS — M17.9 OA (OSTEOARTHRITIS) OF KNEE: ICD-10-CM

## 2024-01-23 PROCEDURE — 20611 DRAIN/INJ JOINT/BURSA W/US: CPT | Mod: 50 | Performed by: PAIN MEDICINE

## 2024-01-23 RX ORDER — HYALURONATE SODIUM 10 MG/ML
SYRINGE (ML) INTRAARTICULAR
Status: COMPLETED | OUTPATIENT
Start: 2024-01-23 | End: 2024-01-23

## 2024-01-23 RX ORDER — LIDOCAINE HYDROCHLORIDE 10 MG/ML
INJECTION, SOLUTION EPIDURAL; INFILTRATION; INTRACAUDAL; PERINEURAL
Status: COMPLETED | OUTPATIENT
Start: 2024-01-23 | End: 2024-01-23

## 2024-01-23 RX ADMIN — LIDOCAINE HYDROCHLORIDE 3 ML: 10 INJECTION, SOLUTION EPIDURAL; INFILTRATION; INTRACAUDAL; PERINEURAL at 13:57

## 2024-01-23 RX ADMIN — Medication 40 MG: at 13:57

## 2024-01-23 ASSESSMENT — PAIN SCALES - GENERAL: PAINLEVEL: MILD PAIN (3)

## 2024-01-23 NOTE — PATIENT INSTRUCTIONS
Follow up with Dr Warren in 4-6 weeks  DISCHARGE INSTRUCTIONS  During office hours (8:00 a.m.- 4:00 p.m.) questions or concerns may be answered  by calling [facility] at  [phone].  Messages received after hours will be returned the following business day.      In the case of an emergency, please dial 911 or seek assistance at the nearest Emergency Room/Urgent Care facility.     All Patients:    You may experience an increase in your symptoms for the first 2 days (It may take 2-3 weeks for the medication to have maximum effect).    You may use ice on the injection site, as frequently as 20 minutes each hour if needed.    You may take your pain medicine.    You may continue taking your regular medication after your injection.    You may shower. No swimming, tub bath or hot tub for 48 hours.  You may remove your bandaid/bandage as soon as you are home.    You may resume light activities, as tolerated.    Resume your usual diet as tolerated.    POSSIBLE EUFLEXXA SIDE EFFECTS-If you experience these symptoms, it should only last for a short period  Swelling at injection site  Swelling of the knee joint              Bruising under the skin     Decreased appetite  Headache  Itching  Nausea  Diarrhea  Irritation to the stomach or intestines         POSSIBLE PROCEDURE SIDE EFFECTS-Call the Sp[facility] Center if you are concerned  Increased Pain           Increased numbness/tingling      Nausea/Vomiting          Bruising/bleeding at site      Redness or swelling                                              Difficulty walking      Weakness           Fever greater than 100.5

## 2024-02-27 ENCOUNTER — OFFICE VISIT (OUTPATIENT)
Dept: PHYSICAL MEDICINE AND REHAB | Facility: CLINIC | Age: 74
End: 2024-02-27
Payer: COMMERCIAL

## 2024-02-27 VITALS — SYSTOLIC BLOOD PRESSURE: 163 MMHG | DIASTOLIC BLOOD PRESSURE: 85 MMHG | HEART RATE: 91 BPM

## 2024-02-27 DIAGNOSIS — M54.50 LUMBAR SPINE PAIN: ICD-10-CM

## 2024-02-27 DIAGNOSIS — M51.369 DDD (DEGENERATIVE DISC DISEASE), LUMBAR: ICD-10-CM

## 2024-02-27 DIAGNOSIS — M17.0 PRIMARY OSTEOARTHRITIS OF BOTH KNEES: Primary | ICD-10-CM

## 2024-02-27 DIAGNOSIS — M79.18 MYOFASCIAL PAIN: ICD-10-CM

## 2024-02-27 PROCEDURE — 99213 OFFICE O/P EST LOW 20 MIN: CPT | Performed by: PHYSICAL MEDICINE & REHABILITATION

## 2024-02-27 ASSESSMENT — PAIN SCALES - GENERAL: PAINLEVEL: MILD PAIN (2)

## 2024-02-27 NOTE — PROGRESS NOTES
Assessment/Plan:      Holly was seen today for knee pain.    Diagnoses and all orders for this visit:    Primary osteoarthritis of both knees    Myofascial pain    Lumbar spine pain    DDD (degenerative disc disease), lumbar         Assessment: Pleasant 73 year old female  with past medical history significant for macular degeneration with:     1.  Bilateral knee pain consistent with osteoarthritis.  Very mild osteoarthritis on plain films from 2019.  Has responded well to Euflexxa in the past.  Over 75% % improvement with recent Euflexxa injections.  Currently doing very well still having some pain in the right knee with certain positions but no longer using a walker or cane.    2.  Chronic lumbar spine pain at the lumbosacral junction and sacroiliac joint.  Consistent with facet arthropathy versus SI pain likely setting of mild scoliosis and thoracic kyphosis and significant lumbar degenerative disc disease.  She appears to have a functional sagittal plane imbalance with plain films revealing 3 cm positive sagittal plane balance.  Did not tolerate trial of duloxetine.  Has done well with Osteopathic manipulative medicine.   Currently doing fairly well.      3.  Diffuse whole body pain consistent with myofascial pain likely superimposed polyarticular  osteoarthritis.              Discussion:    1.  Overall she is doing well with the knee injections.  She gets Euflexxa injections about every 6 months at this time and she is still 75% improved for about 5 months at a time.  She has some low back pain with what she calls stiffness but overall doing well with Celebrex and gabapentin at bedtime.    2.  Continue home exercises and she is currently working out at the gym and she should continue with that.    3.  Follow-up as needed      It was our pleasure caring for your patient today, if there any questions or concerns please do not hesitate to contact us.      Subjective:   Patient ID: Holly Wood is a 73 year  old female.    History of Present Illness: Patient presents for follow-up of bilateral knee pain as well as low back pain.  Knee pain is after Euflexxa injection series of 3 which were performed few weeks ago but overall she is doing quite well about 75% plus improved in her knee left greater than right as far as improvement goes.  She is able to walk without assistive device cane or walker.  Still standing or walking too long or being in the wrong position worsens right greater than left knee.  Better with Celebrex aspirin and sitting.  She also has some low back stiffness in the lumbar region has had SI joint injections in the past which have been helpful overall doing well has been able to go to the gym.  Pain is an 8/10 at worst 1/10 today and at best.      Imaging: Reviewed lumbar plain film imaging showing significant degenerative disc disease throughout the lumbar spine.    Review of Systems: Pertinent positives: None.  Pertinent negatives: No numbness, tingling or weakness.  No bowel or bladder incontinence.  No urinary retention.  No fevers, unintentional weight loss, balance changes, headaches, frequent falling, difficulty swallowing, or coordination difficulties.  All others reviewed are negative.         Past Medical History:   Diagnosis Date    Breast cyst     GERD (gastroesophageal reflux disease)     Insomnia        The following portions of the patient's history were reviewed and updated as appropriate: allergies, current medications, past family history, past medical history, past social history, past surgical history and problem list.           Objective:   Physical Exam:    BP (!) 163/85   Pulse 91   There is no height or weight on file to calculate BMI.      General: Alert and oriented with normal affect. Attention, knowledge, memory, and language are intact. No acute distress.   Eyes: Sclerae are clear.  Respirations: Unlabored. CV: No lower extremity edema.    Gait:  Nonantalgic  Slight  decreased range of motion right knee in flexion with mild effusion.  Left knee full range of motion.  Sensation is intact to light touch throughout the  lower extremities.  Reflexes are  .  1+ patellar and 0 Achilles     Manual muscle testing reveals:  Right /Left out of 5     5/5 knee flexors  5/5 knee extensors  5/5 ankle plantar flexors  5/5 ankle dorsiflexors  5/5    ankle evertors

## 2024-02-27 NOTE — LETTER
2/27/2024         RE: Holly Wood  [address] [facility] [address]        Dear Colleague,    Thank you for referring your patient, Holly Wood, to the [facility]. Please see a copy of my visit note below.    Assessment/Plan:      Holly was seen today for knee pain.    Diagnoses and all orders for this visit:    Primary osteoarthritis of both knees    Myofascial pain    Lumbar spine pain    DDD (degenerative disc disease), lumbar         Assessment: Pleasant 73 year old female  with past medical history significant for macular degeneration with:     1.  Bilateral knee pain consistent with osteoarthritis.  Very mild osteoarthritis on plain films from 2019.  Has responded well to Euflexxa in the past.  Over 75% % improvement with recent Euflexxa injections.  Currently doing very well still having some pain in the right knee with certain positions but no longer using a walker or cane.    2.  Chronic lumbar spine pain at the lumbosacral junction and sacroiliac joint.  Consistent with facet arthropathy versus SI pain likely setting of mild scoliosis and thoracic kyphosis and significant lumbar degenerative disc disease.  She appears to have a functional sagittal plane imbalance with plain films revealing 3 cm positive sagittal plane balance.  Did not tolerate trial of duloxetine.  Has done well with Osteopathic manipulative medicine.   Currently doing fairly well.      3.  Diffuse whole body pain consistent with myofascial pain likely superimposed polyarticular  osteoarthritis.              Discussion:    1.  Overall she is doing well with the knee injections.  She gets Euflexxa injections about every 6 months at this time and she is still 75% improved for about 5 months at a time.  She has some low back pain with what she calls stiffness but overall doing well with Celebrex and gabapentin at bedtime.    2.  Continue home exercises and she is currently working out  at the gym and she should continue with that.    3.  Follow-up as needed      It was our pleasure caring for your patient today, if there any questions or concerns please do not hesitate to contact us.      Subjective:   Patient ID: Holly Wood is a 73 year old female.    History of Present Illness: Patient presents for follow-up of bilateral knee pain as well as low back pain.  Knee pain is after Euflexxa injection series of 3 which were performed few weeks ago but overall she is doing quite well about 75% plus improved in her knee left greater than right as far as improvement goes.  She is able to walk without assistive device cane or walker.  Still standing or walking too long or being in the wrong position worsens right greater than left knee.  Better with Celebrex aspirin and sitting.  She also has some low back stiffness in the lumbar region has had SI joint injections in the past which have been helpful overall doing well has been able to go to the gym.  Pain is an 8/10 at worst 1/10 today and at best.      Imaging: Reviewed lumbar plain film imaging showing significant degenerative disc disease throughout the lumbar spine.    Review of Systems: Pertinent positives: None.  Pertinent negatives: No numbness, tingling or weakness.  No bowel or bladder incontinence.  No urinary retention.  No fevers, unintentional weight loss, balance changes, headaches, frequent falling, difficulty swallowing, or coordination difficulties.  All others reviewed are negative.         Past Medical History:   Diagnosis Date     Breast cyst      GERD (gastroesophageal reflux disease)      Insomnia        The following portions of the patient's history were reviewed and updated as appropriate: allergies, current medications, past family history, past medical history, past social history, past surgical history and problem list.           Objective:   Physical Exam:    BP (!) 163/85   Pulse 91   There is no height or weight on  file to calculate BMI.      General: Alert and oriented with normal affect. Attention, knowledge, memory, and language are intact. No acute distress.   Eyes: Sclerae are clear.  Respirations: Unlabored. CV: No lower extremity edema.    Gait:  Nonantalgic  Slight decreased range of motion right knee in flexion with mild effusion.  Left knee full range of motion.  Sensation is intact to light touch throughout the  lower extremities.  Reflexes are  .  1+ patellar and 0 Achilles     Manual muscle testing reveals:  Right /Left out of 5     5/5 knee flexors  5/5 knee extensors  5/5 ankle plantar flexors  5/5 ankle dorsiflexors  5/5    ankle evertors           Again, thank you for allowing me to participate in the care of your patient.        Sincerely,        Wesley Warren, DO

## 2024-06-11 ENCOUNTER — LAB REQUISITION (OUTPATIENT)
Dept: LAB | Facility: CLINIC | Age: 74
End: 2024-06-11

## 2024-06-11 DIAGNOSIS — E66.01 MORBID (SEVERE) OBESITY DUE TO EXCESS CALORIES (H): ICD-10-CM

## 2024-06-11 DIAGNOSIS — E55.9 VITAMIN D DEFICIENCY, UNSPECIFIED: ICD-10-CM

## 2024-06-11 DIAGNOSIS — I10 ESSENTIAL (PRIMARY) HYPERTENSION: ICD-10-CM

## 2024-06-11 PROCEDURE — 82306 VITAMIN D 25 HYDROXY: CPT | Performed by: NURSE PRACTITIONER

## 2024-06-11 PROCEDURE — 80061 LIPID PANEL: CPT | Performed by: NURSE PRACTITIONER

## 2024-06-11 PROCEDURE — 80053 COMPREHEN METABOLIC PANEL: CPT | Performed by: NURSE PRACTITIONER

## 2024-06-11 PROCEDURE — 83735 ASSAY OF MAGNESIUM: CPT | Performed by: NURSE PRACTITIONER

## 2024-06-12 LAB
ALBUMIN SERPL BCG-MCNC: 4 G/DL (ref 3.5–5.2)
ALP SERPL-CCNC: 99 U/L (ref 40–150)
ALT SERPL W P-5'-P-CCNC: 25 U/L (ref 0–50)
ANION GAP SERPL CALCULATED.3IONS-SCNC: 13 MMOL/L (ref 7–15)
AST SERPL W P-5'-P-CCNC: 30 U/L (ref 0–45)
BILIRUB SERPL-MCNC: 0.2 MG/DL
BUN SERPL-MCNC: 22.1 MG/DL (ref 8–23)
CALCIUM SERPL-MCNC: 9.3 MG/DL (ref 8.8–10.2)
CHLORIDE SERPL-SCNC: 104 MMOL/L (ref 98–107)
CHOLEST SERPL-MCNC: 201 MG/DL
CREAT SERPL-MCNC: 0.68 MG/DL (ref 0.51–0.95)
DEPRECATED HCO3 PLAS-SCNC: 22 MMOL/L (ref 22–29)
EGFRCR SERPLBLD CKD-EPI 2021: 90 ML/MIN/1.73M2
FASTING STATUS PATIENT QL REPORTED: ABNORMAL
FASTING STATUS PATIENT QL REPORTED: ABNORMAL
GLUCOSE SERPL-MCNC: 136 MG/DL (ref 70–99)
HDLC SERPL-MCNC: 51 MG/DL
LDLC SERPL CALC-MCNC: 119 MG/DL
MAGNESIUM SERPL-MCNC: 2.1 MG/DL (ref 1.7–2.3)
NONHDLC SERPL-MCNC: 150 MG/DL
POTASSIUM SERPL-SCNC: 4 MMOL/L (ref 3.4–5.3)
PROT SERPL-MCNC: 6.7 G/DL (ref 6.4–8.3)
SODIUM SERPL-SCNC: 139 MMOL/L (ref 135–145)
TRIGL SERPL-MCNC: 155 MG/DL
VIT D+METAB SERPL-MCNC: 47 NG/ML (ref 20–50)

## 2024-07-17 ENCOUNTER — OFFICE VISIT (OUTPATIENT)
Dept: PHYSICAL MEDICINE AND REHAB | Facility: CLINIC | Age: 74
End: 2024-07-17
Payer: COMMERCIAL

## 2024-07-17 VITALS — SYSTOLIC BLOOD PRESSURE: 181 MMHG | DIASTOLIC BLOOD PRESSURE: 77 MMHG | HEART RATE: 77 BPM

## 2024-07-17 DIAGNOSIS — M17.0 PRIMARY OSTEOARTHRITIS OF BOTH KNEES: Primary | ICD-10-CM

## 2024-07-17 DIAGNOSIS — M79.18 MYOFASCIAL PAIN: ICD-10-CM

## 2024-07-17 DIAGNOSIS — M79.18 GLUTEAL PAIN: ICD-10-CM

## 2024-07-17 DIAGNOSIS — M54.50 LUMBAR SPINE PAIN: ICD-10-CM

## 2024-07-17 DIAGNOSIS — M51.369 DDD (DEGENERATIVE DISC DISEASE), LUMBAR: ICD-10-CM

## 2024-07-17 PROCEDURE — 99214 OFFICE O/P EST MOD 30 MIN: CPT | Performed by: PHYSICAL MEDICINE & REHABILITATION

## 2024-07-17 RX ORDER — HYALURONATE SODIUM 10 MG/ML
20 SYRINGE (ML) INTRAARTICULAR WEEKLY
Status: ACTIVE | OUTPATIENT
Start: 2024-07-31 | End: 2024-08-06

## 2024-07-17 ASSESSMENT — PAIN SCALES - GENERAL: PAINLEVEL: SEVERE PAIN (7)

## 2024-07-17 NOTE — LETTER
7/17/2024      Holly Wood  [address] [facility] [address]      Dear Colleague,    Thank you for referring your patient, Holly Wood, to the [facility]. Please see a copy of my visit note below.    Assessment/Plan:      Holly was seen today for back pain and knee pain.    Diagnoses and all orders for this visit:    Primary osteoarthritis of both knees  -     PAIN US Large Joint Injection Bilateral; Future  -     sodium hyaluronate (EUFLEXXA) injection 20 mg  -     Physical Therapy  Referral; Future    Lumbar spine pain  -     CT Lumbar Spine w/o Contrast; Future  -     Physical Therapy  Referral; Future    DDD (degenerative disc disease), lumbar  -     CT Lumbar Spine w/o Contrast; Future  -     Physical Therapy  Referral; Future    Gluteal pain  -     CT Lumbar Spine w/o Contrast; Future  -     Physical Therapy  Referral; Future    Myofascial pain  -     Physical Therapy  Referral; Future         Assessment: Pleasant 74 year old female with past medical history significant for macular degeneration with:     1.  Significant worsening of bilateral right greater than left  knee pain consistent with osteoarthritis.  Very mild osteoarthritis on plain films from 2019.  Has responded well to Euflexxa in the past.  Has between 50 and 75% % improvement with  Euflexxa injections for 3 to 4 months and then slow worsening.  Had been able to stop using a walker or cane with ambulation.  Pain is significantly worsened over the past couple of months.     2.  Worsening of chronic lumbar spine pain at the lumbosacral junction and sacroiliac joint.  Consistent with facet arthropathy versus SI pain likely setting of mild scoliosis and thoracic kyphosis and significant lumbar degenerative disc disease.  She appears to have a functional sagittal plane imbalance with plain films revealing 3 cm positive sagittal plane balance.  Did not  tolerate trial of duloxetine.  This pain waxes and wanes.  History of PT in the distant past.  Has had SI joint injections along with OMT prior.      3.  New bilateral gluteal pain right greater than left at the ischial tuberosity.  Query hamstring tendinopathy.     4.  Diffuse whole body pain consistent with myofascial pain likely superimposed polyarticular  osteoarthritis.                  Discussion:    1.  We discussed the diagnosis and treatment options.  We discussed the option for the knees such as further PT and Euflexxa.  She does well with Euflexxa.  We also discussed options of imaging for the lumbar spine as I only have plain films.    2.  Euflexxa injections for the bilateral knees.  She would like this with Dr. Beltre.    3.  Will order CT lumbar spine to evaluate the extent of osteoarthritis of the lumbar spine and facets.  I offered her a lumbar spine MRI but she is not interested in an MRI at this time.    4.  Recommend physical therapy for lumbar and gluteal strengthening stabilization as well as knee program.  Order has been placed.    5.  Follow-up at her earliest convenience for injection.      It was our pleasure caring for your patient today, if there any questions or concerns please do not hesitate to contact us.      Subjective:   Patient ID: Holly Wood is a 74 year old female.    History of Present Illness: Patient presents for follow-up of bilateral knee pain along with increased low back pain gluteal pain which is new.  Biggest issue for today is recurrent knee pain right greater than left with stiffness decreased extension of the right knee.  Worse with walking downstairs.  This pain is significantly worsened Euflexxa gave her over 50% relief for over 3 months and she had up to 75% improvement with other injections in the past as well but she had very significant relief per her report.  Over the past few months right greater than left knee pain without new injury.  Pain is an  8/10 or 7/10 today 6/10 at best.    She also has chronic low back pain lumbosacral junction left greater than right in the SI region and PSIS worse with standing walking better with sitting and rest.  No radiation down legs although over the past few months she has had new pain over the ischial tuberosity is worse when for standing up.  She reports that old injury of hamstring tear in 2019 with lifting her  who was sick at the time.  Has not had recent physical therapy.  Does take aspirin gabapentin and Celebrex.      Imaging: Plain films lumbar spine were personally reviewed scoliosis imaging revealing severe disc height loss throughout the lower lumbar spine.    Plain films of the knees 2019 report revealed mild lateral compartment osteoarthritis.    Review of Systems: Pertinent positives: Complains of weakness, bladder control issues.  Denies paresthesias bowel incontinence headache swallowing issues fevers or unintentional weight loss.    Current Outpatient Medications   Medication Sig Dispense Refill     celecoxib (CELEBREX) 100 MG capsule 1 capsule with food Orally Once a day for 90 days       cholecalciferol, vitamin D3, (VITAMIN D3 ORAL) [CHOLECALCIFEROL, VITAMIN D3, (VITAMIN D3 ORAL)] Take by mouth.       famotidine (PEPCID) 40 MG tablet [FAMOTIDINE (PEPCID) 40 MG TABLET] TAKE 1 TABLET BY MOUTH EVERYDAY AT BEDTIME       gabapentin (NEURONTIN) 300 MG capsule TAKE 1 CAPSULE BY MOUTH AT BEDTIME 270 capsule 1     losartan (COZAAR) 25 MG tablet Take 1 tablet by mouth daily       PREVIDENT 5000 DRY MOUTH 1.1 % Gel dental gel [PREVIDENT 5000 DRY MOUTH 1.1 % GEL DENTAL GEL] USE IN PLACE OF REGULAR TOOTHPASTE. BRUSH NORMALLY,THEN SWISH FOAM FOR 30 SECONDS.  6     VIT C/E/ZN/COPPR/LUTEIN/ZEAXAN (PRESERVISION AREDS 2 ORAL) [VIT C/E/ZN/COPPR/LUTEIN/ZEAXAN (PRESERVISION AREDS 2 ORAL)] Take by mouth.       No current facility-administered medications for this visit.       Past Medical History:   Diagnosis Date      Breast cyst      GERD (gastroesophageal reflux disease)      Insomnia        The following portions of the patient's history were reviewed and updated as appropriate: allergies, current medications, past family history, past medical history, past social history, past surgical history and problem list.           Objective:   Physical Exam:    BP (!) 181/77   Pulse 77   There is no height or weight on file to calculate BMI.      General: Alert and oriented with normal affect. Attention, knowledge, memory, and language are intact. No acute distress.   Eyes: Sclerae are clear.  Respirations: Unlabored. CV: No lower extremity edema.  Skin: No rashes seen.    Gait:  Nonantalgic  Tenderness right greater than left knee medial lateral joint space with mildly reduced right knee extension and right greater than left Baker's cyst.  Tenderness over the ischial tuberosity on the right.  Sensation is intact to light touch throughout the   lower extremities.  Reflexes are 1+ patellar and 0 Achilles     Manual muscle testing reveals:  Right /Left out of 5     5/5 hip flexors  5/5 knee flexors  5/5 knee extensors  5/5 ankle plantar flexors  5/5 ankle dorsiflexors  5/5  EHL       Again, thank you for allowing me to participate in the care of your patient.        Sincerely,        Wesley Warren, DO

## 2024-07-17 NOTE — PATIENT INSTRUCTIONS
A bilateral knee injections has been ordered today. Please schedule this injection at least  2 weeks from now to allow time for insurance prior authorization. On the day of your injection, you cannot be sick or taking antibiotics. If you become sick and are prescribed, please call the clinic so your injection can be rescheduled for once you have completed your antibiotics. You will need to bring a  with you for your injection. If you have any questions or concerns prior to your injection, please do not hesitate to call the nurse navigation line at [phone].   A physical therapy order was provided for you today.  You will be contacted by physical therapy.  If nobody contacts you within 3 to 5 days, please contact the clinic at [phone].  It will be very important for you to do your physical therapy exercises on a regular basis to decrease your pain and prevent future pain flares.   An CT was ordered for you today.  You will be contacted by scheduling within 3 days.    If you are not contacted, please call Radiology at [phone].

## 2024-07-17 NOTE — PROGRESS NOTES
Assessment/Plan:      Holly was seen today for back pain and knee pain.    Diagnoses and all orders for this visit:    Primary osteoarthritis of both knees  -     PAIN US Large Joint Injection Bilateral; Future  -     sodium hyaluronate (EUFLEXXA) injection 20 mg  -     Physical Therapy  Referral; Future    Lumbar spine pain  -     CT Lumbar Spine w/o Contrast; Future  -     Physical Therapy  Referral; Future    DDD (degenerative disc disease), lumbar  -     CT Lumbar Spine w/o Contrast; Future  -     Physical Therapy  Referral; Future    Gluteal pain  -     CT Lumbar Spine w/o Contrast; Future  -     Physical Therapy  Referral; Future    Myofascial pain  -     Physical Therapy  Referral; Future         Assessment: Pleasant 74 year old female with past medical history significant for macular degeneration with:     1.  Significant worsening of bilateral right greater than left  knee pain consistent with osteoarthritis.  Very mild osteoarthritis on plain films from 2019.  Has responded well to Euflexxa in the past.  Has between 50 and 75% % improvement with  Euflexxa injections for 3 to 4 months and then slow worsening.  Had been able to stop using a walker or cane with ambulation.  Pain is significantly worsened over the past couple of months.     2.  Worsening of chronic lumbar spine pain at the lumbosacral junction and sacroiliac joint.  Consistent with facet arthropathy versus SI pain likely setting of mild scoliosis and thoracic kyphosis and significant lumbar degenerative disc disease.  She appears to have a functional sagittal plane imbalance with plain films revealing 3 cm positive sagittal plane balance.  Did not tolerate trial of duloxetine.  This pain waxes and wanes.  History of PT in the distant past.  Has had SI joint injections along with OMT prior.      3.  New bilateral gluteal pain right greater than left at the ischial tuberosity.  Query hamstring  tendinopathy.     4.  Diffuse whole body pain consistent with myofascial pain likely superimposed polyarticular  osteoarthritis.                  Discussion:    1.  We discussed the diagnosis and treatment options.  We discussed the option for the knees such as further PT and Euflexxa.  She does well with Euflexxa.  We also discussed options of imaging for the lumbar spine as I only have plain films.    2.  Euflexxa injections for the bilateral knees.  She would like this with Dr. Beltre.    3.  Will order CT lumbar spine to evaluate the extent of osteoarthritis of the lumbar spine and facets.  I offered her a lumbar spine MRI but she is not interested in an MRI at this time.    4.  Recommend physical therapy for lumbar and gluteal strengthening stabilization as well as knee program.  Order has been placed.    5.  Follow-up at her earliest convenience for injection.      It was our pleasure caring for your patient today, if there any questions or concerns please do not hesitate to contact us.      Subjective:   Patient ID: Holly Wood is a 74 year old female.    History of Present Illness: Patient presents for follow-up of bilateral knee pain along with increased low back pain gluteal pain which is new.  Biggest issue for today is recurrent knee pain right greater than left with stiffness decreased extension of the right knee.  Worse with walking downstairs.  This pain is significantly worsened Euflexxa gave her over 50% relief for over 3 months and she had up to 75% improvement with other injections in the past as well but she had very significant relief per her report.  Over the past few months right greater than left knee pain without new injury.  Pain is an 8/10 or 7/10 today 6/10 at best.    She also has chronic low back pain lumbosacral junction left greater than right in the SI region and PSIS worse with standing walking better with sitting and rest.  No radiation down legs although over the past  few months she has had new pain over the ischial tuberosity is worse when for standing up.  She reports that old injury of hamstring tear in 2019 with lifting her  who was sick at the time.  Has not had recent physical therapy.  Does take aspirin gabapentin and Celebrex.      Imaging: Plain films lumbar spine were personally reviewed scoliosis imaging revealing severe disc height loss throughout the lower lumbar spine.    Plain films of the knees 2019 report revealed mild lateral compartment osteoarthritis.    Review of Systems: Pertinent positives: Complains of weakness, bladder control issues.  Denies paresthesias bowel incontinence headache swallowing issues fevers or unintentional weight loss.    Current Outpatient Medications   Medication Sig Dispense Refill    celecoxib (CELEBREX) 100 MG capsule 1 capsule with food Orally Once a day for 90 days      cholecalciferol, vitamin D3, (VITAMIN D3 ORAL) [CHOLECALCIFEROL, VITAMIN D3, (VITAMIN D3 ORAL)] Take by mouth.      famotidine (PEPCID) 40 MG tablet [FAMOTIDINE (PEPCID) 40 MG TABLET] TAKE 1 TABLET BY MOUTH EVERYDAY AT BEDTIME      gabapentin (NEURONTIN) 300 MG capsule TAKE 1 CAPSULE BY MOUTH AT BEDTIME 270 capsule 1    losartan (COZAAR) 25 MG tablet Take 1 tablet by mouth daily      PREVIDENT 5000 DRY MOUTH 1.1 % Gel dental gel [PREVIDENT 5000 DRY MOUTH 1.1 % GEL DENTAL GEL] USE IN PLACE OF REGULAR TOOTHPASTE. BRUSH NORMALLY,THEN SWISH FOAM FOR 30 SECONDS.  6    VIT C/E/ZN/COPPR/LUTEIN/ZEAXAN (PRESERVISION AREDS 2 ORAL) [VIT C/E/ZN/COPPR/LUTEIN/ZEAXAN (PRESERVISION AREDS 2 ORAL)] Take by mouth.       No current facility-administered medications for this visit.       Past Medical History:   Diagnosis Date    Breast cyst     GERD (gastroesophageal reflux disease)     Insomnia        The following portions of the patient's history were reviewed and updated as appropriate: allergies, current medications, past family history, past medical history, past social  history, past surgical history and problem list.           Objective:   Physical Exam:    BP (!) 181/77   Pulse 77   There is no height or weight on file to calculate BMI.      General: Alert and oriented with normal affect. Attention, knowledge, memory, and language are intact. No acute distress.   Eyes: Sclerae are clear.  Respirations: Unlabored. CV: No lower extremity edema.  Skin: No rashes seen.    Gait:  Nonantalgic  Tenderness right greater than left knee medial lateral joint space with mildly reduced right knee extension and right greater than left Baker's cyst.  Tenderness over the ischial tuberosity on the right.  Sensation is intact to light touch throughout the   lower extremities.  Reflexes are 1+ patellar and 0 Achilles     Manual muscle testing reveals:  Right /Left out of 5     5/5 hip flexors  5/5 knee flexors  5/5 knee extensors  5/5 ankle plantar flexors  5/5 ankle dorsiflexors  5/5  EHL

## 2024-08-19 ENCOUNTER — THERAPY VISIT (OUTPATIENT)
Dept: PHYSICAL THERAPY | Facility: REHABILITATION | Age: 74
End: 2024-08-19
Attending: PHYSICAL MEDICINE & REHABILITATION
Payer: COMMERCIAL

## 2024-08-19 DIAGNOSIS — M17.0 PRIMARY OSTEOARTHRITIS OF BOTH KNEES: ICD-10-CM

## 2024-08-19 DIAGNOSIS — M79.18 MYOFASCIAL PAIN: ICD-10-CM

## 2024-08-19 DIAGNOSIS — M54.50 LUMBAR SPINE PAIN: ICD-10-CM

## 2024-08-19 DIAGNOSIS — M79.18 GLUTEAL PAIN: ICD-10-CM

## 2024-08-19 DIAGNOSIS — M51.369 DDD (DEGENERATIVE DISC DISEASE), LUMBAR: ICD-10-CM

## 2024-08-19 PROCEDURE — 97161 PT EVAL LOW COMPLEX 20 MIN: CPT | Mod: GP

## 2024-08-19 PROCEDURE — 97110 THERAPEUTIC EXERCISES: CPT | Mod: GP

## 2024-08-19 NOTE — PROGRESS NOTES
"PHYSICAL THERAPY EVALUATION  Type of Visit: Evaluation       Fall Risk Screen:  Fall screen completed by: PT  Have you fallen 2 or more times in the past year?: No  Have you fallen and had an injury in the past year?: No  Is patient a fall risk?: No    Subjective       Presenting condition or subjective complaint: back,knee,gluteal arthritic and muscle pain    Pt is a 74 year old female who presents with knee pain, back pain, and gluteal pain complaints. She reports significant worsening of R knee pain than left knee pain, pain at lumbosacral junction and sacroiliac joint. Also reports new R gluteal pain R>L at the ischial tuberosity. She mentions that knee injections set up next month after labor day. Is taking Aspirin, gabapentin, sleeps pretty well. \"What brought me in is a new pain\" referring to the gluteal pain. Is scheduled for an [facility] upper endoscopy on September 10th, 2024.     Date of onset: 07/17/24    Relevant medical history: Arthritis; Hearing problems; High blood pressure; Osteoarthritis   .prob  Dates & types of surgery: hilex rigidus 2010, partual hysterectomy 1992    Prior diagnostic imaging/testing results: X-ray     Prior therapy history for the same diagnosis, illness or injury:        Prior Level of Function  Transfers: Independent  Ambulation: Independent  ADL: Independent  IADL:     Living Environment  Social support: Alone   Type of home: House; 1 level   Stairs to enter the home:         Ramp: No   Stairs inside the home: Yes 1 Is there a railing: Yes     Help at home: None  Equipment owned: [employer]     Employment:      Hobbies/Interests:      Patient goals for therapy: walk normally    Pain assessment:      Objective   KNEE EVALUATION  PAIN: Pain Level at Rest: 0/10  Pain Level with Use: 7/10  Pain Location:   Pain Quality: Sharp and Shooting  Pain Frequency: intermittent  Pain is Worst: no change  Pain is Exacerbated By: going down stairs, standing for long periods of time, walking for " long periods of time  Pain is Relieved By: none  Pain Progression: Worsened  INTEGUMENTARY (edema, incisions):   POSTURE:   GAIT:  Weightbearing Status:   Assistive Device(s):   Gait Deviations:   BALANCE/PROPRIOCEPTION:   WEIGHTBEARING ALIGNMENT:   NON-WEIGHTBEARING ALIGNMENT:   ROM:   (Degrees) Left AROM Left PROM  Right AROM Right PROM   Knee Flexion wfl  wfl    Knee Extension wfl  wfl    Pain:   End feel:     STRENGTH:   Pain: - none + mild ++ moderate +++ severe  Strength Scale: 0-5/5 Left Right   Knee Flexion 5 4   Knee Extension 5 +++ (severe)   Quad Set  + (mild)     FLEXIBILITY:   SPECIAL TESTS:   FUNCTIONAL TESTS:   PALPATION: WFL  JOINT MOBILITY:   LUMBAR SPINE EVALUATION  PAIN: Pain Level at Rest: 5/10  Pain Level with Use: 5/10  Pain Location:   Pain Quality: Sharp  Pain Frequency: intermittent or constant  Pain is Worst: no change  Pain is Exacerbated By: reaching overhead  Pain is Relieved By: none  Pain Progression: Worsened  INTEGUMENTARY (edema, incisions):   POSTURE: Sitting Posture: Rounded shoulders, Forward head, Thoracic kyphosis increased  GAIT:   Weightbearing Status: WBAT  Assistive Device(s): None  Gait Deviations: Antalgic  BALANCE/PROPRIOCEPTION:   WEIGHTBEARING ALIGNMENT:   NON-WEIGHTBEARING ALIGNMENT:    ROM:   PELVIC/SI SCREEN:   STRENGTH:     MYOTOMES:   DTR S:   CORD SIGNS:   DERMATOMES:   NEURAL TENSION:   FLEXIBILITY:   LUMBAR/HIP Special Tests:    PELVIS/SI SPECIAL TESTS:   FUNCTIONAL TESTS:   PALPATION:   SPINAL SEGMENTAL CONCLUSIONS:       5xSTS: 28.58 sec  TU.63 sec    Assessment & Plan   CLINICAL IMPRESSIONS  Medical Diagnosis: M17.0 (ICD-10-CM) - Primary osteoarthritis of both knees  M54.50 (ICD-10-CM) - Lumbar spine pain  M51.36 (ICD-10-CM) - DDD (degenerative disc disease), lumbar  M79.18 (ICD-10-CM) - Gluteal pain  M79.18 (ICD-10-CM) - Myofascial pain    Treatment Diagnosis: knee pain, back pain   Impression/Assessment: Patient is a 74 year old female with knee,  back, and gluteal pain complaints.  The following significant findings have been identified: Pain, Decreased ROM/flexibility, Decreased joint mobility, Decreased strength, Impaired balance, Impaired gait, Impaired muscle performance, Decreased activity tolerance, and Impaired posture. These impairments interfere with their ability to perform self care tasks, work tasks, recreational activities, household chores, driving , household mobility, and community mobility as compared to previous level of function.     Clinical Decision Making (Complexity):  Clinical Presentation: Stable/Uncomplicated  Clinical Presentation Rationale: based on medical and personal factors listed in PT evaluation  Clinical Decision Making (Complexity): Moderate complexity    PLAN OF CARE  Treatment Interventions:  Modalities: Cryotherapy, Hot Pack  Interventions: Gait Training, Manual Therapy, Neuromuscular Re-education, Therapeutic Activity, Therapeutic Exercise, Self-Care/Home Management    Long Term Goals     PT Goal 1  Goal Identifier: HEP  Goal Description: Pt will be IND in HEP and self care managment of symptoms  Target Date: 11/11/24  PT Goal 2  Goal Identifier: TUG  Goal Description: Pt will decrease TUG score by 5+ seconds to increase functional mobility in home and around the community  Target Date: 11/11/24  PT Goal 3  Goal Identifier: Strength  Goal Description: Pt will be able to perform 5 x STS test in 12 sec or less to demonstrate adequate LE strength for transfers  Target Date: 11/11/24  PT Goal 4  Goal Identifier: Walking  Goal Description: Patient will be able to tolerate walking for 5+ minutes in order to increase functional activity levels  Target Date: 11/11/24      Frequency of Treatment: 1x/week over 12 weeks  Duration of Treatment: 12 weeks    Recommended Referrals to Other Professionals:   Education Assessment:   Learner/Method: Patient    Risks and benefits of evaluation/treatment have been explained.    Patient/Family/caregiver agrees with Plan of Care.     Evaluation Time:     PT Eval, Low Complexity Minutes (46823): 29       Signing Clinician: Elias Barroso, PT        [facility]                                                                                   OUTPATIENT PHYSICAL THERAPY      PLAN OF TREATMENT FOR OUTPATIENT REHABILITATION   Patient's Last Name, First Name, Holly James YOB: 1950   Provider's Name   [facility]   Medical Record No.  [id number]     Onset Date: 07/17/24  Start of Care Date: 08/19/24     Medical Diagnosis:  M17.0 (ICD-10-CM) - Primary osteoarthritis of both knees  M54.50 (ICD-10-CM) - Lumbar spine pain  M51.36 (ICD-10-CM) - DDD (degenerative disc disease), lumbar  M79.18 (ICD-10-CM) - Gluteal pain  M79.18 (ICD-10-CM) - Myofascial pain      PT Treatment Diagnosis:  knee pain, back pain Plan of Treatment  Frequency/Duration: 1x/week over 12 weeks/ 12 weeks    Certification date from 08/19/24 to 11/11/24         See note for plan of treatment details and functional goals     Elias Barroso, PT                         I CERTIFY THE NEED FOR THESE SERVICES FURNISHED UNDER        THIS PLAN OF TREATMENT AND WHILE UNDER MY CARE     (Physician attestation of this document indicates review and certification of the therapy plan).              Referring Provider:  Wesley Warren    Initial Assessment  See Epic Evaluation- Start of Care Date: 08/19/24

## 2024-08-21 ENCOUNTER — HOSPITAL ENCOUNTER (OUTPATIENT)
Dept: CT IMAGING | Facility: HOSPITAL | Age: 74
Discharge: HOME | End: 2024-08-21
Attending: PHYSICAL MEDICINE & REHABILITATION | Admitting: PHYSICAL MEDICINE & REHABILITATION
Payer: COMMERCIAL

## 2024-08-21 DIAGNOSIS — M54.50 LUMBAR SPINE PAIN: ICD-10-CM

## 2024-08-21 DIAGNOSIS — M51.369 DDD (DEGENERATIVE DISC DISEASE), LUMBAR: ICD-10-CM

## 2024-08-21 DIAGNOSIS — M79.18 GLUTEAL PAIN: ICD-10-CM

## 2024-08-21 PROCEDURE — 72131 CT LUMBAR SPINE W/O DYE: CPT

## 2024-08-22 ENCOUNTER — TELEPHONE (OUTPATIENT)
Dept: PHYSICAL MEDICINE AND REHAB | Facility: CLINIC | Age: 74
End: 2024-08-22
Payer: COMMERCIAL

## 2024-08-22 NOTE — TELEPHONE ENCOUNTER
----- Message from Wesley Warren sent at 8/22/2024 10:15 AM CDT -----  Please contact the patient and inform her that I reviewed the CT scan of the lumbar spine which reveals wear-and-tear changes of the disc through the lumbar spine as well as the facets which are the small joints in the posterior spine most significant at the L5-S1 level and L4-5 level.  No fractures.    Please continue with the plan of physical therapy and we can discuss the CT scan and further options for the back at follow-up.  She can schedule follow-up for after she completes the injections of her knees with Dr. Beltre.

## 2024-09-17 ENCOUNTER — RADIOLOGY INJECTION OFFICE VISIT (OUTPATIENT)
Dept: PHYSICAL MEDICINE AND REHAB | Facility: CLINIC | Age: 74
End: 2024-09-17
Attending: PHYSICAL MEDICINE & REHABILITATION
Payer: COMMERCIAL

## 2024-09-17 VITALS
OXYGEN SATURATION: 96 % | RESPIRATION RATE: 16 BRPM | SYSTOLIC BLOOD PRESSURE: 142 MMHG | HEART RATE: 88 BPM | TEMPERATURE: 98.2 F | DIASTOLIC BLOOD PRESSURE: 82 MMHG

## 2024-09-17 DIAGNOSIS — M17.0 PRIMARY OSTEOARTHRITIS OF BOTH KNEES: Primary | ICD-10-CM

## 2024-09-17 DIAGNOSIS — M17.0 PRIMARY OSTEOARTHRITIS OF BOTH KNEES: ICD-10-CM

## 2024-09-17 PROCEDURE — 20611 DRAIN/INJ JOINT/BURSA W/US: CPT | Mod: RT | Performed by: PAIN MEDICINE

## 2024-09-17 RX ORDER — LIDOCAINE HYDROCHLORIDE 10 MG/ML
INJECTION, SOLUTION EPIDURAL; INFILTRATION; INTRACAUDAL; PERINEURAL
Status: COMPLETED | OUTPATIENT
Start: 2024-09-17 | End: 2024-09-17

## 2024-09-17 RX ORDER — HYALURONATE SODIUM 10 MG/ML
SYRINGE (ML) INTRAARTICULAR
Status: COMPLETED | OUTPATIENT
Start: 2024-09-17 | End: 2024-09-17

## 2024-09-17 RX ORDER — HYALURONATE SODIUM 10 MG/ML
20 SYRINGE (ML) INTRAARTICULAR WEEKLY
Status: ACTIVE | OUTPATIENT
Start: 2024-10-01 | End: 2024-10-21

## 2024-09-17 RX ADMIN — Medication 20 MG: at 13:08

## 2024-09-17 RX ADMIN — LIDOCAINE HYDROCHLORIDE 2 ML: 10 INJECTION, SOLUTION EPIDURAL; INFILTRATION; INTRACAUDAL; PERINEURAL at 13:07

## 2024-09-17 ASSESSMENT — PAIN SCALES - GENERAL: PAINLEVEL: EXTREME PAIN (9)

## 2024-09-17 NOTE — PATIENT INSTRUCTIONS
Follow-up in 1 week for Euflexxa #2 injections with Dr. Beltre.    DISCHARGE INSTRUCTIONS    During office hours (8:00 a.m.- 4:00 p.m.) questions or concerns may be answered  by calling [facility] Nurses at  [phone].  Messages received after hours will be returned the following business day.      In the case of an emergency, please dial 911 or seek assistance at the nearest Emergency Room/Urgent Care facility.     All Patients:    You may experience an increase in your symptoms for the first 2 days (It may take 2-3 weeks for the medication to have maximum effect).    You may use ice on the injection site, as frequently as 20 minutes each hour if needed.    You may take your pain medicine.    You may continue taking your regular medication after your injection.    You may shower. No swimming, tub bath or hot tub for 48 hours.  You may remove your bandaid/bandage as soon as you are home.    You may resume light activities, as tolerated.    Resume your usual diet as tolerated.      POSSIBLE EUFLEXXA SIDE EFFECTS    -If you experience these symptoms, it should only last for a short period    Swelling at injection site  Swelling of the knee joint              Bruising under the skin     Decreased appetite  Headache  Itching  Nausea  Diarrhea  Irritation to the stomach or intestines         POSSIBLE PROCEDURE SIDE EFFECTS  -Call the Spine Center if you are concerned  Increased Pain           Increased numbness/tingling      Nausea/Vomiting          Bruising/bleeding at site      Redness or swelling                                              Difficulty walking      Weakness           Fever greater than 100.5

## 2024-09-19 ENCOUNTER — THERAPY VISIT (OUTPATIENT)
Dept: PHYSICAL THERAPY | Facility: REHABILITATION | Age: 74
End: 2024-09-19
Payer: COMMERCIAL

## 2024-09-19 DIAGNOSIS — M17.0 PRIMARY OSTEOARTHRITIS OF BOTH KNEES: Primary | ICD-10-CM

## 2024-09-19 PROCEDURE — 97110 THERAPEUTIC EXERCISES: CPT | Mod: GP

## 2024-09-24 ENCOUNTER — RADIOLOGY INJECTION OFFICE VISIT (OUTPATIENT)
Dept: PHYSICAL MEDICINE AND REHAB | Facility: CLINIC | Age: 74
End: 2024-09-24
Attending: PHYSICAL MEDICINE & REHABILITATION
Payer: COMMERCIAL

## 2024-09-24 VITALS
OXYGEN SATURATION: 95 % | SYSTOLIC BLOOD PRESSURE: 187 MMHG | DIASTOLIC BLOOD PRESSURE: 86 MMHG | HEART RATE: 86 BPM | TEMPERATURE: 97.2 F

## 2024-09-24 DIAGNOSIS — M17.0 PRIMARY OSTEOARTHRITIS OF BOTH KNEES: ICD-10-CM

## 2024-09-24 RX ORDER — HYALURONATE SODIUM 10 MG/ML
SYRINGE (ML) INTRAARTICULAR
Status: COMPLETED | OUTPATIENT
Start: 2024-09-24 | End: 2024-09-24

## 2024-09-24 RX ORDER — LIDOCAINE HYDROCHLORIDE 10 MG/ML
INJECTION, SOLUTION EPIDURAL; INFILTRATION; INTRACAUDAL; PERINEURAL
Status: COMPLETED | OUTPATIENT
Start: 2024-09-24 | End: 2024-09-24

## 2024-09-24 RX ADMIN — LIDOCAINE HYDROCHLORIDE 2 ML: 10 INJECTION, SOLUTION EPIDURAL; INFILTRATION; INTRACAUDAL; PERINEURAL at 10:55

## 2024-09-24 RX ADMIN — Medication 20 MG: at 15:50

## 2024-09-24 ASSESSMENT — PAIN SCALES - GENERAL: PAINLEVEL: SEVERE PAIN (6)

## 2024-09-24 NOTE — PATIENT INSTRUCTIONS
DISCHARGE INSTRUCTIONS    During office hours (8:00 a.m.- 4:00 p.m.) questions or concerns may be answered  by calling [facility] Nurses at  [phone].  Messages received after hours will be returned the following business day.      In the case of an emergency, please dial 911 or seek assistance at the nearest Emergency Room/Urgent Care facility.     All Patients:    You may experience an increase in your symptoms for the first 2 days (It may take 2-3 weeks for the medication to have maximum effect).    You may use ice on the injection site, as frequently as 20 minutes each hour if needed.    You may take your pain medicine.    You may continue taking your regular medication after your injection.    You may shower. No swimming, tub bath or hot tub for 48 hours.  You may remove your bandaid/bandage as soon as you are home.    You may resume light activities, as tolerated.    Resume your usual diet as tolerated.      POSSIBLE EUFLEXXA SIDE EFFECTS    -If you experience these symptoms, it should only last for a short period    Swelling at injection site  Swelling of the knee joint              Bruising under the skin     Decreased appetite  Headache  Itching  Nausea  Diarrhea  Irritation to the stomach or intestines         POSSIBLE PROCEDURE SIDE EFFECTS  -Call the Spine Center if you are concerned  Increased Pain           Increased numbness/tingling      Nausea/Vomiting          Bruising/bleeding at site      Redness or swelling                                              Difficulty walking      Weakness           Fever greater than 100.5

## 2024-10-01 ENCOUNTER — RADIOLOGY INJECTION OFFICE VISIT (OUTPATIENT)
Dept: PHYSICAL MEDICINE AND REHAB | Facility: CLINIC | Age: 74
End: 2024-10-01
Attending: PHYSICAL MEDICINE & REHABILITATION
Payer: COMMERCIAL

## 2024-10-01 VITALS
SYSTOLIC BLOOD PRESSURE: 138 MMHG | TEMPERATURE: 97.5 F | DIASTOLIC BLOOD PRESSURE: 90 MMHG | OXYGEN SATURATION: 96 % | HEART RATE: 70 BPM

## 2024-10-01 DIAGNOSIS — M17.0 PRIMARY OSTEOARTHRITIS OF BOTH KNEES: ICD-10-CM

## 2024-10-01 PROCEDURE — 20611 DRAIN/INJ JOINT/BURSA W/US: CPT | Mod: 50 | Performed by: PAIN MEDICINE

## 2024-10-01 RX ORDER — HYALURONATE SODIUM 10 MG/ML
SYRINGE (ML) INTRAARTICULAR
Status: COMPLETED | OUTPATIENT
Start: 2024-10-01 | End: 2024-10-01

## 2024-10-01 RX ORDER — LIDOCAINE HYDROCHLORIDE 10 MG/ML
INJECTION, SOLUTION EPIDURAL; INFILTRATION; INTRACAUDAL; PERINEURAL
Status: COMPLETED | OUTPATIENT
Start: 2024-10-01 | End: 2024-10-01

## 2024-10-01 RX ADMIN — Medication 20 MG: at 14:11

## 2024-10-01 RX ADMIN — LIDOCAINE HYDROCHLORIDE 2 ML: 10 INJECTION, SOLUTION EPIDURAL; INFILTRATION; INTRACAUDAL; PERINEURAL at 14:11

## 2024-10-01 RX ADMIN — Medication 20 MG: at 13:19

## 2024-10-01 ASSESSMENT — PAIN SCALES - GENERAL: PAINLEVEL: SEVERE PAIN (7)

## 2024-10-01 NOTE — PATIENT INSTRUCTIONS
DISCHARGE INSTRUCTIONS    During office hours (8:00 a.m.- 4:00 p.m.) questions or concerns may be answered  by calling [facility] Nurses at  [phone].  Messages received after hours will be returned the following business day.      In the case of an emergency, please dial 911 or seek assistance at the nearest Emergency Room/Urgent Care facility.     All Patients:    You may experience an increase in your symptoms for the first 2 days (It may take 2-3 weeks for the medication to have maximum effect).    You may use ice on the injection site, as frequently as 20 minutes each hour if needed.    You may take your pain medicine.    You may continue taking your regular medication after your injection.    You may shower. No swimming, tub bath or hot tub for 48 hours.  You may remove your bandaid/bandage as soon as you are home.    You may resume light activities, as tolerated.    Resume your usual diet as tolerated.      POSSIBLE EUFLEXXA SIDE EFFECTS    -If you experience these symptoms, it should only last for a short period    Swelling at injection site  Swelling of the knee joint              Bruising under the skin     Decreased appetite  Headache  Itching  Nausea  Diarrhea  Irritation to the stomach or intestines         POSSIBLE PROCEDURE SIDE EFFECTS  -Call the Sp[facility] Center if you are concerned  Increased Pain           Increased numbness/tingling      Nausea/Vomiting          Bruising/bleeding at site      Redness or swelling                                              Difficulty walking      Weakness           Fever greater than 100.5

## 2024-10-09 ENCOUNTER — THERAPY VISIT (OUTPATIENT)
Dept: PHYSICAL THERAPY | Facility: REHABILITATION | Age: 74
End: 2024-10-09
Payer: COMMERCIAL

## 2024-10-09 DIAGNOSIS — M17.0 PRIMARY OSTEOARTHRITIS OF BOTH KNEES: Primary | ICD-10-CM

## 2024-10-09 PROCEDURE — 97110 THERAPEUTIC EXERCISES: CPT | Mod: GP

## 2024-10-14 ENCOUNTER — OFFICE VISIT (OUTPATIENT)
Dept: PHYSICAL MEDICINE AND REHAB | Facility: CLINIC | Age: 74
End: 2024-10-14
Payer: COMMERCIAL

## 2024-10-14 DIAGNOSIS — M25.552 HIP PAIN, LEFT: ICD-10-CM

## 2024-10-14 DIAGNOSIS — M54.16 LUMBAR RADICULAR PAIN: ICD-10-CM

## 2024-10-14 DIAGNOSIS — M17.0 PRIMARY OSTEOARTHRITIS OF BOTH KNEES: ICD-10-CM

## 2024-10-14 DIAGNOSIS — M79.18 GLUTEAL PAIN: ICD-10-CM

## 2024-10-14 DIAGNOSIS — M47.816 LUMBAR FACET ARTHROPATHY: ICD-10-CM

## 2024-10-14 DIAGNOSIS — M54.50 LUMBAR SPINE PAIN: Primary | ICD-10-CM

## 2024-10-14 PROCEDURE — 99214 OFFICE O/P EST MOD 30 MIN: CPT | Performed by: PHYSICAL MEDICINE & REHABILITATION

## 2024-10-14 ASSESSMENT — PAIN SCALES - GENERAL: PAINLEVEL: MILD PAIN (2)

## 2024-10-14 NOTE — PROGRESS NOTES
Assessment/Plan:      Holly was seen today for knee pain and back pain.    Diagnoses and all orders for this visit:    Lumbar spine pain    Lumbar radicular pain  -     PAIN Interlaminar Epidural Steroid Injection Lumbar/Sacral; Future    Gluteal pain    Lumbar facet arthropathy    Hip pain, left  -     XR Hip Left 2-3 Views; Future    Primary osteoarthritis of both knees         Assessment: Pleasant 74 year old female with past medical history significant for macular degeneration with:     1.  Persistent worsening chronic lumbar spine pain at the lumbosacral junction and sacroiliac joint.  Consistent with facet arthropathy versus proximal radicular pain and potentially SI pain likely setting of mild scoliosis and thoracic kyphosis and significant lumbar degenerative disc disease.  She appears to have a functional sagittal plane imbalance with plain films revealing 3 cm positive sagittal plane balance.  Did not tolerate trial of duloxetine.  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  The degenerative changes  And moderate to severe facet arthropathy at L4-5 and moderate at L5-S1 with moderate to severe foraminal stenosis at those levels.  Has degenerative changes of the SI joints as well.  Has had SI joint injections along with OMT prior.  Some increased pain with recent physical therapy home exercises.       2.  Worsening left greater than right gluteal pain.  Most consistent with proximal radicular pain given the foraminal stenosis in the lumbar spine and CT scan moderate to severe at L4-5 and L5-S1 however could be related to the left hip as well as she is decreased range of motion.       3.  Bilateral, right greater than left  knee pain consistent with osteoarthritis.  Very mild osteoarthritis on plain films from 2019.  Has responded well to Euflexxa in the past.  Has between 50 and 75% improvement with  Euflexxa injections  for 3 to 4 months and then slow worsening.  Had been able to stop using a walker or cane with ambulation.  At least 50% improved after recent injections.  Improved balance with physical therapy        4.  Diffuse whole body pain consistent with myofascial pain likely superimposed polyarticular  osteoarthritis.                  Discussion:    1.  I discussed the diagnosis and treatment options.  We discussed options of injection such as lumbar epidural versus medial branch blocks along with SI joint injections continue therapy modifying her home exercises.    2.  Continue physical therapy but modify home exercises to avoid aggressively doing exercises and increased pain.     3.  Recommend L5-S1 interlaminar epidural steroid injection to help with any radicular pain into the gluteal region from the lumbar foraminal stenosis.    4.  Plain films of the left hip to evaluate extent of osteoarthritis.    5.  If interlaminar epidural steroid injection not effective can consider lumbar medial branch blocks L3, 4, 5.    6.  Follow-up at earliest convenience for injection.    It was our pleasure caring for your patient today, if there any questions or concerns please do not hesitate to contact us.    Over 30 minutes were spent on the date of the encounter performing chart review, patient visit and documentation in addition to any procedure.       Subjective:   Patient ID: Holly Wood is a 74 year old female.    History of Present Illness: Patient presents for follow-up evaluation of her bilateral knee pain after Euflexxa injections also low back pain over the lumbosacral junction left greater than right gluteal pain mostly in the left gluteal region also into the anterior hip.  Her knees are doing well after the Euflexxa injections no issues.  She is at least 50% better and typically takes several weeks for her to have optimal effect from the injections.    She continues to have low back pain lumbosacral junction worse  with standing and walking better with sitting and with medication.  Taking Celebrex regularly despite having esophagitis working with GI.  Pain is a 7/10 at worst 2/10 today and at best.  She also has pain to the left gluteal region.  Previously had trouble laying on her sides and now she has trouble laying on her stomach or her back but her sides are okay.  No radiation distally into the legs paresthesias or weakness.  She has had increase in gluteal pain with some of the gluteal stretches provided by PT.     I reviewed the notes from physical therapy.  3 visits.  At that time describing exercises becoming easier feels more steady and confident with walking.      Imaging: CT report and images were personally reviewed and discussed with the patient.  A plastic model was utilized during the discussion.  CT of the lumbar spine from August personally reviewed.  This shows severe disc height loss T12-L1 with ankylosis.  L1-2 moderate disc height loss mild facet arthropathy and mild bilateral foraminal stenosis.  L2-3 moderate disc height loss mild to moderate central stenosis mild bilateral foraminal stenosis.  L3-4 there is a 2 mm retrolisthesis.  Severe disc height loss moderate facet arthropathy and moderate right mild left foraminal stenosis.  Severe disc height loss L4-5 moderate facet arthropathy and moderate to severe bilateral foraminal stenosis.  L5-S1 severe disc height loss with moderate facet arthropathy moderate to severe right moderate left foraminal stenosis.  Vacuum disc at multiple levels most significant foraminal stenosis on my review on the right at L5-S1 and left at L4-5.  Facet arthropathy most severe L5-S1 L4-5    Review of Systems: Pertinent positives: None.  Pertinent negatives: No numbness, tingling or weakness.  No bowel or bladder incontinence.  No urinary retention.  No fevers, unintentional weight loss, balance changes, headaches, frequent falling, difficulty swallowing, or coordination  difficulties.  All others reviewed are negative.        Current Outpatient Medications   Medication Sig Dispense Refill    celecoxib (CELEBREX) 100 MG capsule 1 capsule with food Orally Once a day for 90 days      cholecalciferol, vitamin D3, (VITAMIN D3 ORAL) [CHOLECALCIFEROL, VITAMIN D3, (VITAMIN D3 ORAL)] Take by mouth.      famotidine (PEPCID) 40 MG tablet [FAMOTIDINE (PEPCID) 40 MG TABLET] TAKE 1 TABLET BY MOUTH EVERYDAY AT BEDTIME      gabapentin (NEURONTIN) 300 MG capsule TAKE 1 CAPSULE BY MOUTH AT BEDTIME 270 capsule 1    losartan (COZAAR) 25 MG tablet Take 1 tablet by mouth daily      PREVIDENT 5000 DRY MOUTH 1.1 % Gel dental gel [PREVIDENT 5000 DRY MOUTH 1.1 % GEL DENTAL GEL] USE IN PLACE OF REGULAR TOOTHPASTE. BRUSH NORMALLY,THEN SWISH FOAM FOR 30 SECONDS.  6    VIT C/E/ZN/COPPR/LUTEIN/ZEAXAN (PRESERVISION AREDS 2 ORAL) [VIT C/E/ZN/COPPR/LUTEIN/ZEAXAN (PRESERVISION AREDS 2 ORAL)] Take by mouth.       Current Facility-Administered Medications   Medication Dose Route Frequency Provider Last Rate Last Admin    sodium hyaluronate (EUFLEXXA) injection 20 mg  20 mg INTRA-ARTICULAR Weekly    20 mg at 10/01/24 1319       Past Medical History:   Diagnosis Date    Breast cyst     GERD (gastroesophageal reflux disease)     Insomnia        The following portions of the patient's history were reviewed and updated as appropriate: allergies, current medications, past family history, past medical history, past social history, past surgical history and problem list.           Objective:   Physical Exam:    There were no vitals taken for this visit.  There is no height or weight on file to calculate BMI.      General: Alert and oriented with normal affect. Attention, knowledge, memory, and language are intact. No acute distress.   Eyes: Sclerae are clear.  Respirations: Unlabored.    Gait:  Nonantalgic, flexed at the waist.  Decreased range of motion left hip external rotation from supine.  Negative JOSEPH test and thigh  thrust on the left.  Sensation is intact to light touch throughout the unit(s) lower extremities.  Reflexes are 2+ patellar and 1+ Achilles      Manual muscle testing reveals:  Right /Left out of 5     5/5 hip flexors  5/5 knee flexors  5/5 knee extensors  5/5 ankle plantar flexors  5/5 ankle dorsiflexors  5/5  EHL

## 2024-10-14 NOTE — PATIENT INSTRUCTIONS
An xray was ordered for you today.  Please call Radiology at [phone].    A lumbar epidural injection has been ordered today. Please schedule this injection at least 2 weeks from now to allow time for insurance prior authorization. On the day of your injection, you cannot be sick or taking antibiotics. If you become sick and are prescribed, please call the clinic so your injection can be rescheduled for once you have completed your antibiotics.  It is recommended that you do not have a vaccination within 2 weeks of your procedure if it will contain steroids, as this may make the vaccination less effective.  You will need to bring a  with you for your injection. If you have any questions or concerns prior to your injection, please do not hesitate to call the nurse navigation line at [phone].

## 2024-10-14 NOTE — LETTER
10/14/2024      Holly Wood  [address] [address]      Dear Colleague,    Thank you for referring your patient, Holly Wood, to the [facility]. Please see a copy of my visit note below.    Assessment/Plan:      Holly was seen today for knee pain and back pain.    Diagnoses and all orders for this visit:    Lumbar spine pain    Lumbar radicular pain  -     PAIN Interlaminar Epidural Steroid Injection Lumbar/Sacral; Future    Gluteal pain    Lumbar facet arthropathy    Hip pain, left  -     XR Hip Left 2-3 Views; Future    Primary osteoarthritis of both knees         Assessment: Pleasant 74 year old female with past medical history significant for macular degeneration with:     1.  Persistent worsening chronic lumbar spine pain at the lumbosacral junction and sacroiliac joint.  Consistent with facet arthropathy versus proximal radicular pain and potentially SI pain likely setting of mild scoliosis and thoracic kyphosis and significant lumbar degenerative disc disease.  She appears to have a functional sagittal plane imbalance with plain films revealing 3 cm positive sagittal plane balance.  Did not tolerate trial of duloxetine.  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  The degenerative changes  And moderate to severe facet arthropathy at L4-5 and moderate at L5-S1 with moderate to severe foraminal stenosis at those levels.  Has degenerative changes of the SI joints as well.  Has had SI joint injections along with OMT prior.  Some increased pain with recent physical therapy home exercises.       2.  Worsening left greater than right gluteal pain.  Most consistent with proximal radicular pain given the foraminal stenosis in the lumbar spine and CT scan moderate to severe at L4-5 and L5-S1 however could be related to the left hip as well as she is decreased range of  motion.       3.  Bilateral, right greater than left  knee pain consistent with osteoarthritis.  Very mild osteoarthritis on plain films from 2019.  Has responded well to Euflexxa in the past.  Has between 50 and 75% improvement with  Euflexxa injections for 3 to 4 months and then slow worsening.  Had been able to stop using a walker or cane with ambulation.  At least 50% improved after recent injections.  Improved balance with physical therapy        4.  Diffuse whole body pain consistent with myofascial pain likely superimposed polyarticular  osteoarthritis.                  Discussion:    1.  I discussed the diagnosis and treatment options.  We discussed options of injection such as lumbar epidural versus medial branch blocks along with SI joint injections continue therapy modifying her home exercises.    2.  Continue physical therapy but modify home exercises to avoid aggressively doing exercises and increased pain.     3.  Recommend L5-S1 interlaminar epidural steroid injection to help with any radicular pain into the gluteal region from the lumbar foraminal stenosis.    4.  Plain films of the left hip to evaluate extent of osteoarthritis.    5.  If interlaminar epidural steroid injection not effective can consider lumbar medial branch blocks L3, 4, 5.    6.  Follow-up at earliest convenience for injection.    It was our pleasure caring for your patient today, if there any questions or concerns please do not hesitate to contact us.    Over 30 minutes were spent on the date of the encounter performing chart review, patient visit and documentation in addition to any procedure.       Subjective:   Patient ID: Holly Wood is a 74 year old female.    History of Present Illness: Patient presents for follow-up evaluation of her bilateral knee pain after Euflexxa injections also low back pain over the lumbosacral junction left greater than right gluteal pain mostly in the left gluteal region also into the  anterior hip.  Her knees are doing well after the Euflexxa injections no issues.  She is at least 50% better and typically takes several weeks for her to have optimal effect from the injections.    She continues to have low back pain lumbosacral junction worse with standing and walking better with sitting and with medication.  Taking Celebrex regularly despite having esophagitis working with GI.  Pain is a 7/10 at worst 2/10 today and at best.  She also has pain to the left gluteal region.  Previously had trouble laying on her sides and now she has trouble laying on her stomach or her back but her sides are okay.  No radiation distally into the legs paresthesias or weakness.  She has had increase in gluteal pain with some of the gluteal stretches provided by PT.     I reviewed the notes from physical therapy.  3 visits.  At that time describing exercises becoming easier feels more steady and confident with walking.      Imaging: CT report and images were personally reviewed and discussed with the patient.  A plastic model was utilized during the discussion.  CT of the lumbar spine from August personally reviewed.  This shows severe disc height loss T12-L1 with ankylosis.  L1-2 moderate disc height loss mild facet arthropathy and mild bilateral foraminal stenosis.  L2-3 moderate disc height loss mild to moderate central stenosis mild bilateral foraminal stenosis.  L3-4 there is a 2 mm retrolisthesis.  Severe disc height loss moderate facet arthropathy and moderate right mild left foraminal stenosis.  Severe disc height loss L4-5 moderate facet arthropathy and moderate to severe bilateral foraminal stenosis.  L5-S1 severe disc height loss with moderate facet arthropathy moderate to severe right moderate left foraminal stenosis.  Vacuum disc at multiple levels most significant foraminal stenosis on my review on the right at L5-S1 and left at L4-5.  Facet arthropathy most severe L5-S1 L4-5    Review of Systems:  Pertinent positives: None.  Pertinent negatives: No numbness, tingling or weakness.  No bowel or bladder incontinence.  No urinary retention.  No fevers, unintentional weight loss, balance changes, headaches, frequent falling, difficulty swallowing, or coordination difficulties.  All others reviewed are negative.        Current Outpatient Medications   Medication Sig Dispense Refill     celecoxib (CELEBREX) 100 MG capsule 1 capsule with food Orally Once a day for 90 days       cholecalciferol, vitamin D3, (VITAMIN D3 ORAL) [CHOLECALCIFEROL, VITAMIN D3, (VITAMIN D3 ORAL)] Take by mouth.       famotidine (PEPCID) 40 MG tablet [FAMOTIDINE (PEPCID) 40 MG TABLET] TAKE 1 TABLET BY MOUTH EVERYDAY AT BEDTIME       gabapentin (NEURONTIN) 300 MG capsule TAKE 1 CAPSULE BY MOUTH AT BEDTIME 270 capsule 1     losartan (COZAAR) 25 MG tablet Take 1 tablet by mouth daily       PREVIDENT 5000 DRY MOUTH 1.1 % Gel dental gel [PREVIDENT 5000 DRY MOUTH 1.1 % GEL DENTAL GEL] USE IN PLACE OF REGULAR TOOTHPASTE. BRUSH NORMALLY,THEN SWISH FOAM FOR 30 SECONDS.  6     VIT C/E/ZN/COPPR/LUTEIN/ZEAXAN (PRESERVISION AREDS 2 ORAL) [VIT C/E/ZN/COPPR/LUTEIN/ZEAXAN (PRESERVISION AREDS 2 ORAL)] Take by mouth.       Current Facility-Administered Medications   Medication Dose Route Frequency Provider Last Rate Last Admin     sodium hyaluronate (EUFLEXXA) injection 20 mg  20 mg INTRA-ARTICULAR Weekly    20 mg at 10/01/24 1319       Past Medical History:   Diagnosis Date     Breast cyst      GERD (gastroesophageal reflux disease)      Insomnia        The following portions of the patient's history were reviewed and updated as appropriate: allergies, current medications, past family history, past medical history, past social history, past surgical history and problem list.           Objective:   Physical Exam:    There were no vitals taken for this visit.  There is no height or weight on file to calculate BMI.      General: Alert and oriented with normal  affect. Attention, knowledge, memory, and language are intact. No acute distress.   Eyes: Sclerae are clear.  Respirations: Unlabored.    Gait:  Nonantalgic, flexed at the waist.  Decreased range of motion left hip external rotation from supine.  Negative JOSEPH test and thigh thrust on the left.  Sensation is intact to light touch throughout the unit(s) lower extremities.  Reflexes are 2+ patellar and 1+ Achilles      Manual muscle testing reveals:  Right /Left out of 5     5/5 hip flexors  5/5 knee flexors  5/5 knee extensors  5/5 ankle plantar flexors  5/5 ankle dorsiflexors  5/5  EHL       Again, thank you for allowing me to participate in the care of your patient.        Sincerely,        Wesley Warren, DO

## 2024-10-17 ENCOUNTER — THERAPY VISIT (OUTPATIENT)
Dept: PHYSICAL THERAPY | Facility: REHABILITATION | Age: 74
End: 2024-10-17
Payer: COMMERCIAL

## 2024-10-17 ENCOUNTER — HOSPITAL ENCOUNTER (OUTPATIENT)
Dept: GENERAL RADIOLOGY | Facility: HOSPITAL | Age: 74
Discharge: HOME | End: 2024-10-17
Attending: PHYSICAL MEDICINE & REHABILITATION | Admitting: PHYSICAL MEDICINE & REHABILITATION
Payer: COMMERCIAL

## 2024-10-17 DIAGNOSIS — M17.0 PRIMARY OSTEOARTHRITIS OF BOTH KNEES: Primary | ICD-10-CM

## 2024-10-17 DIAGNOSIS — M25.552 HIP PAIN, LEFT: ICD-10-CM

## 2024-10-17 PROCEDURE — 97110 THERAPEUTIC EXERCISES: CPT | Mod: GP

## 2024-10-17 PROCEDURE — 73502 X-RAY EXAM HIP UNI 2-3 VIEWS: CPT

## 2024-10-21 ENCOUNTER — TELEPHONE (OUTPATIENT)
Dept: PHYSICAL MEDICINE AND REHAB | Facility: CLINIC | Age: 74
End: 2024-10-21
Payer: COMMERCIAL

## 2024-10-21 NOTE — TELEPHONE ENCOUNTER
Phone call to patient to review results and provider's recommendations. Results given and explained. Discussed moving forward with the current treatment plan of CECY as well as PT. Stated understanding and appreciation for call.

## 2024-10-21 NOTE — TELEPHONE ENCOUNTER
----- Message from Wesley Warren sent at 10/21/2024  9:22 AM CDT -----  I reviewed the x-rays of her hip which show mild wear-and-tear changes of your left hip and moderate changes of the sacroiliac joint.    Continue with plan of interlaminar epidural steroid injection.

## 2024-10-24 ENCOUNTER — ANCILLARY PROCEDURE (OUTPATIENT)
Dept: MAMMOGRAPHY | Facility: HOSPITAL | Age: 74
End: 2024-10-24
Attending: FAMILY MEDICINE
Payer: COMMERCIAL

## 2024-10-24 ENCOUNTER — THERAPY VISIT (OUTPATIENT)
Dept: PHYSICAL THERAPY | Facility: REHABILITATION | Age: 74
End: 2024-10-24
Payer: COMMERCIAL

## 2024-10-24 DIAGNOSIS — M17.0 PRIMARY OSTEOARTHRITIS OF BOTH KNEES: Primary | ICD-10-CM

## 2024-10-24 DIAGNOSIS — Z12.31 VISIT FOR SCREENING MAMMOGRAM: ICD-10-CM

## 2024-10-24 PROCEDURE — 77063 BREAST TOMOSYNTHESIS BI: CPT

## 2024-10-24 PROCEDURE — 97112 NEUROMUSCULAR REEDUCATION: CPT | Mod: GP

## 2024-10-30 ENCOUNTER — THERAPY VISIT (OUTPATIENT)
Dept: PHYSICAL THERAPY | Facility: REHABILITATION | Age: 74
End: 2024-10-30
Payer: COMMERCIAL

## 2024-10-30 DIAGNOSIS — M17.0 PRIMARY OSTEOARTHRITIS OF BOTH KNEES: Primary | ICD-10-CM

## 2024-10-30 PROCEDURE — 97112 NEUROMUSCULAR REEDUCATION: CPT | Mod: GP

## 2024-11-06 ENCOUNTER — THERAPY VISIT (OUTPATIENT)
Dept: PHYSICAL THERAPY | Facility: REHABILITATION | Age: 74
End: 2024-11-06
Payer: COMMERCIAL

## 2024-11-06 DIAGNOSIS — M17.0 PRIMARY OSTEOARTHRITIS OF BOTH KNEES: Primary | ICD-10-CM

## 2024-11-06 PROCEDURE — 97112 NEUROMUSCULAR REEDUCATION: CPT | Mod: GP

## 2024-11-12 ENCOUNTER — TELEPHONE (OUTPATIENT)
Dept: PHYSICAL MEDICINE AND REHAB | Facility: CLINIC | Age: 74
End: 2024-11-12
Payer: COMMERCIAL

## 2024-11-12 NOTE — TELEPHONE ENCOUNTER
Called patient to discuss upcoming injection and required Aspirin hold. She states that she stopped taking Aspirin over a year ago. No further information needed.

## 2024-11-13 ENCOUNTER — THERAPY VISIT (OUTPATIENT)
Dept: PHYSICAL THERAPY | Facility: REHABILITATION | Age: 74
End: 2024-11-13
Payer: COMMERCIAL

## 2024-11-13 DIAGNOSIS — M17.0 PRIMARY OSTEOARTHRITIS OF BOTH KNEES: Primary | ICD-10-CM

## 2024-11-13 PROCEDURE — 97110 THERAPEUTIC EXERCISES: CPT | Mod: GP

## 2024-11-13 NOTE — PROGRESS NOTES
"    DISCHARGE  Reason for Discharge: No further expectation of progress.  Patient chooses to discontinue therapy.    Equipment Issued:     Discharge Plan: Patient to continue home program.    Referring Provider:  Wesley Warren       11/13/24 0500   Appointment Info   Signing clinician's name / credentials Elias Diaz, PT, DPT   Total/Authorized Visits 12   Visits Used 8   Medical Diagnosis M17.0 (ICD-10-CM) - Primary osteoarthritis of both knees  M54.50 (ICD-10-CM) - Lumbar spine pain  M51.36 (ICD-10-CM) - DDD (degenerative disc disease), lumbar  M79.18 (ICD-10-CM) - Gluteal pain  M79.18 (ICD-10-CM) - Myofascial pain   PT Tx Diagnosis knee pain, back pain, gluteal pain   Progress Note/Certification   Start of Care Date 08/19/24   Onset of illness/injury or Date of Surgery 07/17/24   Therapy Frequency 1x/week over 12 weeks   Predicted Duration 12 weeks   Certification date from 08/19/24   Certification date to 11/11/24   Progress Note Completed Date 08/19/24   GOALS   PT Goals 2;3;4   PT Goal 1   Goal Identifier HEP   Goal Description Pt will be IND in HEP and self care managment of symptoms   Goal Progress met   Target Date 11/11/24   Date Met 11/13/24   PT Goal 2   Goal Identifier TUG   Goal Description Pt will decrease TUG score by 5+ seconds to increase functional mobility in home and around the community   Goal Progress met   Target Date 11/11/24   Date Met 10/09/24   PT Goal 3   Goal Identifier Strength   Goal Description Pt will be able to perform 5 x STS test in 12 sec or less to demonstrate adequate LE strength for transfers   Goal Progress ongoing - 16 sec - 10/9/24   Target Date 11/11/24   PT Goal 4   Goal Identifier Walking   Goal Description Patient will be able to tolerate walking for 5+ minutes in order to increase functional activity levels   Goal Progress ongoing   Target Date 11/11/24   Subjective Report   Subjective Report \"Exercises are going well, and the red band is really helpful. I have a " "spine injection scheduled for Monday.\"   Objective Measures   Objective Measures Objective Measure 1;Objective Measure 2;Objective Measure 3   Objective Measure 1   Objective Measure 5XSTS   Details 13.93 seconds -   Objective Measure 2   Objective Measure TUG   Details 16.45 sec - 10/9/24   Treatment Interventions (PT)   Interventions Therapeutic Procedure/Exercise;Self Care/Home Management;Manual Therapy;Neuromuscular Re-education   Therapeutic Procedure/Exercise   Therapeutic Procedures: strength, endurance, ROM, flexibility minutes (13894) 30   Ther Proc 1 - Details Nustep x 6 min, at // bars: high knee marches x 4, repeated going backwards. alternating tap ups 2 x 10 emphasizing stepping with the toe with one set, and heel with the other set, alternating step-ups 1 x 10   Skilled Intervention initiated HEP, provided patient with verbal, tactile, and visual cues to ensure proper form and technique with exercises   Patient Response/Progress pt tolerated well   Neuromuscular Re-education   Neuro Re-ed 1 - Details nustep x 5 min, administered 5XSTS, hip abd SLR 1 x 10 w/lvl 1 band, glute bridge 1 x 15 w/lvl 1 band around thighs, 1 x 10 supine abdominal marching, 1 x 10 supine abdominal marching #3B   Skilled Intervention to promote functional and dynamic balance   Patient Response/Progress experienced dizziness/sense of vertigo at end of exercise   Manual Therapy   Skilled Intervention to promote mobility and alleviate pain - STM, joint mobs   Self Care/home Management   Skilled Intervention Education provided regarding response to exercise, differentiating between pain and soreness, as well as the importance of compliance with HEP.   Education   Learner/Method Patient   Plan   Home program PTRx (printed)   Updates to plan of care patient is currently doing glute bridge with pillow squeeze, added yellow band to glute bridge as well as hip abd SLR level 1 band   Plan for next session see dc summary   Comments "   Comments Patient returned for her 7th follow-up visit, which was her final scheduled session. She reported feeling significantly better compared to when she first began treatment and expressed confidence in her progress, indicating that she feels ready to conclude therapy at this time.  In today s session, we focused on further challenging her with walking drills that emphasized knee stability and control during dynamic movement. Although she was utilizing the parallel bars for balance and support, she was able to demonstrate solid knee stability throughout the exercise, maintaining proper alignment and control with each step. This is a positive sign of her improved strength and proprioception in the lower extremities.  We also took time to review the importance of her home exercise program. I emphasized that continued engagement with her exercises is crucial for maintaining the progress she s made and for reducing the risk of symptom recurrence. The patient acknowledged this and verbalized understanding, recognizing that ongoing effort will help sustain her knee stability and prevent future setbacks.   Total Session Time   Timed Code Treatment Minutes 30   Total Treatment Time (sum of timed and untimed services) 30

## 2024-11-18 ENCOUNTER — RADIOLOGY INJECTION OFFICE VISIT (OUTPATIENT)
Dept: PHYSICAL MEDICINE AND REHAB | Facility: CLINIC | Age: 74
End: 2024-11-18
Attending: PHYSICAL MEDICINE & REHABILITATION
Payer: COMMERCIAL

## 2024-11-18 VITALS
RESPIRATION RATE: 16 BRPM | TEMPERATURE: 97.5 F | SYSTOLIC BLOOD PRESSURE: 144 MMHG | HEART RATE: 75 BPM | OXYGEN SATURATION: 96 % | DIASTOLIC BLOOD PRESSURE: 84 MMHG

## 2024-11-18 DIAGNOSIS — M54.16 LUMBAR RADICULAR PAIN: ICD-10-CM

## 2024-11-18 PROCEDURE — 62323 NJX INTERLAMINAR LMBR/SAC: CPT | Performed by: PAIN MEDICINE

## 2024-11-18 RX ORDER — DEXAMETHASONE SODIUM PHOSPHATE 10 MG/ML
INJECTION, SOLUTION INTRAMUSCULAR; INTRAVENOUS
Status: COMPLETED | OUTPATIENT
Start: 2024-11-18 | End: 2024-11-18

## 2024-11-18 RX ORDER — LIDOCAINE HYDROCHLORIDE 10 MG/ML
INJECTION, SOLUTION EPIDURAL; INFILTRATION; INTRACAUDAL; PERINEURAL
Status: COMPLETED | OUTPATIENT
Start: 2024-11-18 | End: 2024-11-18

## 2024-11-18 RX ADMIN — LIDOCAINE HYDROCHLORIDE 2 ML: 10 INJECTION, SOLUTION EPIDURAL; INFILTRATION; INTRACAUDAL; PERINEURAL at 13:30

## 2024-11-18 RX ADMIN — DEXAMETHASONE SODIUM PHOSPHATE 10 MG: 10 INJECTION, SOLUTION INTRAMUSCULAR; INTRAVENOUS at 13:30

## 2024-11-18 ASSESSMENT — PAIN SCALES - GENERAL
PAINLEVEL_OUTOF10: EXTREME PAIN (8)
PAINLEVEL_OUTOF10: EXTREME PAIN (8)

## 2024-11-18 NOTE — PATIENT INSTRUCTIONS
Follow-up visit with Dr. Warren in 2-4 weeks to discuss injection outcome and determine care plan going forward.       DISCHARGE INSTRUCTIONS    During office hours (8:00 a.m.- 4:00 p.m.) questions or concerns may be answered  by calling [facility] Nurses at  [phone].  Messages received after hours will be returned the following business day.      In the case of an emergency, please dial 911 or seek assistance at the nearest Emergency Room/Urgent Care facility.     All Patients:    You may experience an increase in your symptoms for the first 2 days (It may take anywhere between 2 days- 2 weeks for the steroid to have maximum effect).    You may use ice on the injection site, as frequently as 20 minutes each hour if needed.    You may take your pain medicine.    You may continue taking your regular medication after your injection. If you have had a Medial Branch Block you may resume pain medication once your pain diary is completed.    You may shower. No swimming, tub bath or hot tub for 48 hours.  You may remove your bandaid/bandage as soon as you are home.    You may resume light activities, as tolerated.    Resume your usual diet as tolerated.    If you were told to hold any blood thinning medications you may resume taking them 24 hours after your procedure as prescribed.    It is strongly advised that you do not drive for 1-3 hours post injection.    If you have had oral sedation:  Do not drive for 8 hours post injection.      If you have had IV sedation:  Do not drive for 24 hours post injection.  Do not operate hazardous machinery or make important personal/business decisions for 24 hours.      POSSIBLE STEROID SIDE EFFECTS (If steroid/cortisone was used for your procedure)    -If you experience these symptoms, it should only last for a short period    Swelling of the legs              Skin redness (flushing)     Mouth (oral) irritation   Blood sugar (glucose) levels            Sweats                     Mood changes  Headache  Sleeplessness  Weakened immune system for up to 14 days, which could increase the risk of james the COVID-19 virus and/or experiencing more severe symptoms of the disease, if exposed.  Decreased effectiveness of the flu vaccine if given within 2 weeks of the steroid.         POSSIBLE PROCEDURE SIDE EFFECTS  -Call the Spine Center if you are concerned  Increased Pain           Increased numbness/tingling      Nausea/Vomiting          Bruising/bleeding at site      Redness or swelling                                              Difficulty walking      Weakness           Fever greater than 100.5    *In the event of a severe headache after an epidural steroid injection that is relieved by lying down, please call the [facility] to speak with a clinical staff member*

## 2024-12-17 NOTE — PROGRESS NOTES
Assessment/Plan:      Holly was seen today for back pain.    Diagnoses and all orders for this visit:    Lumbar spine pain    Lumbar radicular pain    Gluteal pain    Lumbar facet arthropathy    Primary osteoarthritis of both knees         Assessment: Pleasant 74 year old female with past medical history significant for macular degeneration with:     1.  Improvement of chronic lumbar spine pain at the lumbosacral junction and sacroiliac joint.  Consistent with facet arthropathy versus proximal radicular pain and potentially SI pain likely setting of mild scoliosis and thoracic kyphosis and significant lumbar degenerative disc disease.  She appears to have a functional sagittal plane imbalance with plain films revealing 3 cm positive sagittal plane balance.  Did not tolerate trial of duloxetine.  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  The degenerative changes and moderate to severe facet arthropathy at L4-5 and moderate at L5-S1 with moderate to severe foraminal stenosis at those levels.  Has degenerative changes of the SI joints as well.  Has had SI joint injections along with OMT prior.  Some increased pain with recent physical therapy home exercises.  Over 50% improvement following L5-S1 interlaminar epidural steroid injection.        2.  Improvement of left greater than right gluteal pain.  Most consistent with proximal radicular pain given the foraminal stenosis in the lumbar spine and CT scan moderate to severe at L4-5 and L5-S1 however could be related to the left hip as well as she is decreased range of motion.  Resolution after lumbar epidural steroid injection.        3.  Bilateral, right greater than left  knee pain consistent with osteoarthritis.  Very mild osteoarthritis on plain films from 2019.  Has responded well to Euflexxa in the past.  Has between 50 and 75% improvement with  Euflexxa injections for 3 to 4  months and then slow worsening.  Had been able to stop using a walker or cane with ambulation.  At least 50% improved after recent injections.  Improved balance with physical therapy        4.  Diffuse whole body pain consistent with myofascial pain likely superimposed polyarticular  osteoarthritis.          Discussion:    1.  Overall she continues to do well with regards to her lumbar pain gluteal pain and bilateral knee pain after interventional procedures.  She has had physical therapy.  Continues to do her home exercises.  Over 50% improvement for lumbar epidural steroid injection L5-S1 interlaminar    2.  Continue home exercises from PT.    3.  Follow-up with me as needed.      It was our pleasure caring for your patient today, if there any questions or concerns please do not hesitate to contact us.      Subjective:   Patient ID: Holly Wood is a 74 year old female.    History of Present Illness: Patient presents for follow-up of lumbar spine pain and bilateral knee pain.  Low back pain is significantly improved after lumbar epidural steroid injection.  Was having gluteal pain as well gluteal pain resolved couple weeks after the injection and then 2 to 3 weeks after the injection he started have improvement in her back pain.  Still gets pain with standing walking lifting feed the birds every day and lifting the food bucket is painful.  Better with sitting and medications.  Pain is 6/10 at worst 2/10 today and at best.  Has home exercises she does every other day.  Taking Celebrex as well as gabapentin.  Still remains over 50% improved following lumbar epidural steroid injection is pleased with her progress.  Her knees are doing well as well      Imaging: Left hip x-ray was reviewed showing normal joint alignment with mild degenerative changes of the left hip.    CT lumbar spine from August 2024 images reviewed for medical decision-making purposes.  Severe disc height loss L3-4 L4-L5-S1 with moderate at  L1-2 L2-3.  Vacuum disc phenomenon L5-S1 moderate facet arthropathy moderate severe right and moderate left foraminal stenosis.  Moderate severe bilateral foraminal stenosis L4-5 mild central stenosis and moderate facet arthropathy moderate right mild left foraminal stenosis L3-4.  Slight left lumbar scoliotic curve.  Vacuum disc phenomenon at multiple levels L1-2 ankylosis anteriorly.    Review of Systems: Pertinent positives:   None.  Pertinent negatives: No numbness, tingling or weakness.  No bowel or bladder incontinence.  No urinary retention.  No fevers, unintentional weight loss, balance changes, headaches, frequent falling, difficulty swallowing, or coordination difficulties.  All others reviewed are negative.           Current Outpatient Medications   Medication Sig Dispense Refill    celecoxib (CELEBREX) 100 MG capsule 1 capsule with food Orally Once a day for 90 days      cholecalciferol, vitamin D3, (VITAMIN D3 ORAL) [CHOLECALCIFEROL, VITAMIN D3, (VITAMIN D3 ORAL)] Take by mouth.      famotidine (PEPCID) 40 MG tablet [FAMOTIDINE (PEPCID) 40 MG TABLET] TAKE 1 TABLET BY MOUTH EVERYDAY AT BEDTIME      gabapentin (NEURONTIN) 300 MG capsule TAKE 1 CAPSULE BY MOUTH AT BEDTIME 270 capsule 1    losartan (COZAAR) 25 MG tablet Take 1 tablet by mouth daily      PREVIDENT 5000 DRY MOUTH 1.1 % Gel dental gel [PREVIDENT 5000 DRY MOUTH 1.1 % GEL DENTAL GEL] USE IN PLACE OF REGULAR TOOTHPASTE. BRUSH NORMALLY,THEN SWISH FOAM FOR 30 SECONDS.  6    VIT C/E/ZN/COPPR/LUTEIN/ZEAXAN (PRESERVISION AREDS 2 ORAL) [VIT C/E/ZN/COPPR/LUTEIN/ZEAXAN (PRESERVISION AREDS 2 ORAL)] Take by mouth.       No current facility-administered medications for this visit.       Past Medical History:   Diagnosis Date    Breast cyst     GERD (gastroesophageal reflux disease)     Insomnia        The following portions of the patient's history were reviewed and updated as appropriate: allergies, current medications, past family history, past medical  history, past social history, past surgical history and problem list.           Objective:   Physical Exam:    BP (!) 176/79   Pulse 86   There is no height or weight on file to calculate BMI.      General: Alert and oriented with normal affect. Attention, knowledge, memory, and language are intact. No acute distress.   Eyes: Sclerae are clear.  Respirations: Unlabored. CV: No lower extremity edema.          Sensation is intact to light touch throughout the   lower extremities.  Reflexes are   0 patellar and 0 Achilles with downgoing toes.    Manual muscle testing reveals:  Right /Left out of 5     5/5 knee flexors  5/5 knee extensors  5/5 ankle plantar flexors  5/5 ankle dorsiflexors  5/5   ankle evertors

## 2024-12-18 ENCOUNTER — OFFICE VISIT (OUTPATIENT)
Dept: PHYSICAL MEDICINE AND REHAB | Facility: CLINIC | Age: 74
End: 2024-12-18
Payer: COMMERCIAL

## 2024-12-18 VITALS — SYSTOLIC BLOOD PRESSURE: 176 MMHG | HEART RATE: 86 BPM | DIASTOLIC BLOOD PRESSURE: 79 MMHG

## 2024-12-18 DIAGNOSIS — M17.0 PRIMARY OSTEOARTHRITIS OF BOTH KNEES: ICD-10-CM

## 2024-12-18 DIAGNOSIS — M79.18 GLUTEAL PAIN: ICD-10-CM

## 2024-12-18 DIAGNOSIS — M54.16 LUMBAR RADICULAR PAIN: ICD-10-CM

## 2024-12-18 DIAGNOSIS — M54.50 LUMBAR SPINE PAIN: Primary | ICD-10-CM

## 2024-12-18 DIAGNOSIS — M47.816 LUMBAR FACET ARTHROPATHY: ICD-10-CM

## 2024-12-18 PROCEDURE — 99213 OFFICE O/P EST LOW 20 MIN: CPT | Performed by: PHYSICAL MEDICINE & REHABILITATION

## 2024-12-18 ASSESSMENT — PAIN SCALES - GENERAL: PAINLEVEL_OUTOF10: MILD PAIN (2)

## 2024-12-18 NOTE — LETTER
12/18/2024      Holly Wood  [address] [facility] [address]      Dear Colleague,    Thank you for referring your patient, Holly Wood, to the [facility]. Please see a copy of my visit note below.    Assessment/Plan:      Holly was seen today for back pain.    Diagnoses and all orders for this visit:    Lumbar spine pain    Lumbar radicular pain    Gluteal pain    Lumbar facet arthropathy    Primary osteoarthritis of both knees         Assessment: Pleasant 74 year old female with past medical history significant for macular degeneration with:     1.  Improvement of chronic lumbar spine pain at the lumbosacral junction and sacroiliac joint.  Consistent with facet arthropathy versus proximal radicular pain and potentially SI pain likely setting of mild scoliosis and thoracic kyphosis and significant lumbar degenerative disc disease.  She appears to have a functional sagittal plane imbalance with plain films revealing 3 cm positive sagittal plane balance.  Did not tolerate trial of duloxetine.  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  The degenerative changes and moderate to severe facet arthropathy at L4-5 and moderate at L5-S1 with moderate to severe foraminal stenosis at those levels.  Has degenerative changes of the SI joints as well.  Has had SI joint injections along with OMT prior.  Some increased pain with recent physical therapy home exercises.  Over 50% improvement following L5-S1 interlaminar epidural steroid injection.        2.  Improvement of left greater than right gluteal pain.  Most consistent with proximal radicular pain given the foraminal stenosis in the lumbar spine and CT scan moderate to severe at L4-5 and L5-S1 however could be related to the left hip as well as she is decreased range of motion.  Resolution after lumbar epidural steroid injection.         3.  Bilateral, right greater than left  knee pain consistent with osteoarthritis.  Very mild osteoarthritis on plain films from 2019.  Has responded well to Euflexxa in the past.  Has between 50 and 75% improvement with  Euflexxa injections for 3 to 4 months and then slow worsening.  Had been able to stop using a walker or cane with ambulation.  At least 50% improved after recent injections.  Improved balance with physical therapy        4.  Diffuse whole body pain consistent with myofascial pain likely superimposed polyarticular  osteoarthritis.          Discussion:    1.  Overall she continues to do well with regards to her lumbar pain gluteal pain and bilateral knee pain after interventional procedures.  She has had physical therapy.  Continues to do her home exercises.  Over 50% improvement for lumbar epidural steroid injection L5-S1 interlaminar    2.  Continue home exercises from PT.    3.  Follow-up with me as needed.      It was our pleasure caring for your patient today, if there any questions or concerns please do not hesitate to contact us.      Subjective:   Patient ID: Holly Wood is a 74 year old female.    History of Present Illness: Patient presents for follow-up of lumbar spine pain and bilateral knee pain.  Low back pain is significantly improved after lumbar epidural steroid injection.  Was having gluteal pain as well gluteal pain resolved couple weeks after the injection and then 2 to 3 weeks after the injection he started have improvement in her back pain.  Still gets pain with standing walking lifting feed the birds every day and lifting the food bucket is painful.  Better with sitting and medications.  Pain is 6/10 at worst 2/10 today and at best.  Has home exercises she does every other day.  Taking Celebrex as well as gabapentin.  Still remains over 50% improved following lumbar epidural steroid injection is pleased with her progress.  Her knees are doing well as well      Imaging:  Left hip x-ray was reviewed showing normal joint alignment with mild degenerative changes of the left hip.    CT lumbar spine from August 2024 images reviewed for medical decision-making purposes.  Severe disc height loss L3-4 L4-L5-S1 with moderate at L1-2 L2-3.  Vacuum disc phenomenon L5-S1 moderate facet arthropathy moderate severe right and moderate left foraminal stenosis.  Moderate severe bilateral foraminal stenosis L4-5 mild central stenosis and moderate facet arthropathy moderate right mild left foraminal stenosis L3-4.  Slight left lumbar scoliotic curve.  Vacuum disc phenomenon at multiple levels L1-2 ankylosis anteriorly.    Review of Systems: Pertinent positives:   None.  Pertinent negatives: No numbness, tingling or weakness.  No bowel or bladder incontinence.  No urinary retention.  No fevers, unintentional weight loss, balance changes, headaches, frequent falling, difficulty swallowing, or coordination difficulties.  All others reviewed are negative.           Current Outpatient Medications   Medication Sig Dispense Refill     celecoxib (CELEBREX) 100 MG capsule 1 capsule with food Orally Once a day for 90 days       cholecalciferol, vitamin D3, (VITAMIN D3 ORAL) [CHOLECALCIFEROL, VITAMIN D3, (VITAMIN D3 ORAL)] Take by mouth.       famotidine (PEPCID) 40 MG tablet [FAMOTIDINE (PEPCID) 40 MG TABLET] TAKE 1 TABLET BY MOUTH EVERYDAY AT BEDTIME       gabapentin (NEURONTIN) 300 MG capsule TAKE 1 CAPSULE BY MOUTH AT BEDTIME 270 capsule 1     losartan (COZAAR) 25 MG tablet Take 1 tablet by mouth daily       PREVIDENT 5000 DRY MOUTH 1.1 % Gel dental gel [PREVIDENT 5000 DRY MOUTH 1.1 % GEL DENTAL GEL] USE IN PLACE OF REGULAR TOOTHPASTE. BRUSH NORMALLY,THEN SWISH FOAM FOR 30 SECONDS.  6     VIT C/E/ZN/COPPR/LUTEIN/ZEAXAN (PRESERVISION AREDS 2 ORAL) [VIT C/E/ZN/COPPR/LUTEIN/ZEAXAN (PRESERVISION AREDS 2 ORAL)] Take by mouth.       No current facility-administered medications for this visit.       Past Medical  History:   Diagnosis Date     Breast cyst      GERD (gastroesophageal reflux disease)      Insomnia        The following portions of the patient's history were reviewed and updated as appropriate: allergies, current medications, past family history, past medical history, past social history, past surgical history and problem list.           Objective:   Physical Exam:    BP (!) 176/79   Pulse 86   There is no height or weight on file to calculate BMI.      General: Alert and oriented with normal affect. Attention, knowledge, memory, and language are intact. No acute distress.   Eyes: Sclerae are clear.  Respirations: Unlabored. CV: No lower extremity edema.          Sensation is intact to light touch throughout the   lower extremities.  Reflexes are   0 patellar and 0 Achilles with downgoing toes.    Manual muscle testing reveals:  Right /Left out of 5     5/5 knee flexors  5/5 knee extensors  5/5 ankle plantar flexors  5/5 ankle dorsiflexors  5/5   ankle evertors      Again, thank you for allowing me to participate in the care of your patient.        Sincerely,        Wesley Warren, DO

## 2025-02-05 DIAGNOSIS — M79.18 LEFT BUTTOCK PAIN: ICD-10-CM

## 2025-02-05 DIAGNOSIS — M25.561 PAIN IN BOTH KNEES, UNSPECIFIED CHRONICITY: ICD-10-CM

## 2025-02-05 DIAGNOSIS — M25.562 PAIN IN BOTH KNEES, UNSPECIFIED CHRONICITY: ICD-10-CM

## 2025-02-05 RX ORDER — GABAPENTIN 300 MG/1
CAPSULE ORAL
Qty: 270 CAPSULE | Refills: 1 | Status: SHIPPED | OUTPATIENT
Start: 2025-02-05

## 2025-05-08 ENCOUNTER — RADIOLOGY INJECTION OFFICE VISIT (OUTPATIENT)
Dept: PHYSICAL MEDICINE AND REHAB | Facility: CLINIC | Age: 75
End: 2025-05-08
Attending: PHYSICAL MEDICINE & REHABILITATION
Payer: COMMERCIAL

## 2025-05-08 VITALS
OXYGEN SATURATION: 96 % | HEART RATE: 87 BPM | TEMPERATURE: 97.2 F | DIASTOLIC BLOOD PRESSURE: 81 MMHG | SYSTOLIC BLOOD PRESSURE: 183 MMHG

## 2025-05-08 DIAGNOSIS — M17.0 PRIMARY OSTEOARTHRITIS OF BOTH KNEES: ICD-10-CM

## 2025-05-08 RX ORDER — LIDOCAINE HYDROCHLORIDE 10 MG/ML
INJECTION, SOLUTION EPIDURAL; INFILTRATION; INTRACAUDAL; PERINEURAL
Status: COMPLETED | OUTPATIENT
Start: 2025-05-08 | End: 2025-05-08

## 2025-05-08 RX ADMIN — LIDOCAINE HYDROCHLORIDE 3 ML: 10 INJECTION, SOLUTION EPIDURAL; INFILTRATION; INTRACAUDAL; PERINEURAL at 13:29

## 2025-05-08 ASSESSMENT — PAIN SCALES - GENERAL: PAINLEVEL_OUTOF10: SEVERE PAIN (7)

## 2025-05-08 NOTE — PATIENT INSTRUCTIONS
DISCHARGE INSTRUCTIONS    During office hours (8:00 a.m.- 4:00 p.m.) questions or concerns may be answered  by calling [facility] Navigation Nurses at  [phone].  Messages received after hours will be returned the following business day.      In the case of an emergency, please dial 911 or seek assistance at the nearest Emergency Room/Urgent Care facility.     All Patients:    You may experience an increase in your symptoms for the first 2 days (It may take 2-3 weeks for the medication to have maximum effect).    You may use ice on the injection site, as frequently as 20 minutes each hour if needed.    You may take your pain medicine.    You may continue taking your regular medication after your injection.    You may shower. No swimming, tub bath or hot tub for 48 hours.  You may remove your bandaid/bandage as soon as you are home.    You may resume light activities, as tolerated.    Resume your usual diet as tolerated.      POSSIBLE EUFLEXXA SIDE EFFECTS    -If you experience these symptoms, it should only last for a short period    Swelling at injection site  Swelling of the knee joint              Bruising under the skin     Decreased appetite  Headache  Itching  Nausea  Diarrhea  Irritation to the stomach or intestines         POSSIBLE PROCEDURE SIDE EFFECTS  -Call the Spine Center if you are concerned  Increased Pain           Increased numbness/tingling      Nausea/Vomiting          Bruising/bleeding at site      Redness or swelling                                              Difficulty walking      Weakness           Fever greater than 100.5

## 2025-05-15 ENCOUNTER — RADIOLOGY INJECTION OFFICE VISIT (OUTPATIENT)
Dept: PHYSICAL MEDICINE AND REHAB | Facility: CLINIC | Age: 75
End: 2025-05-15
Attending: PHYSICAL MEDICINE & REHABILITATION
Payer: COMMERCIAL

## 2025-05-15 VITALS
HEART RATE: 89 BPM | SYSTOLIC BLOOD PRESSURE: 172 MMHG | TEMPERATURE: 97.7 F | OXYGEN SATURATION: 95 % | DIASTOLIC BLOOD PRESSURE: 77 MMHG

## 2025-05-15 DIAGNOSIS — M17.0 PRIMARY OSTEOARTHRITIS OF BOTH KNEES: ICD-10-CM

## 2025-05-15 RX ORDER — LIDOCAINE HYDROCHLORIDE 10 MG/ML
INJECTION, SOLUTION EPIDURAL; INFILTRATION; INTRACAUDAL; PERINEURAL
Status: COMPLETED | OUTPATIENT
Start: 2025-05-15 | End: 2025-05-15

## 2025-05-15 RX ADMIN — LIDOCAINE HYDROCHLORIDE 3 ML: 10 INJECTION, SOLUTION EPIDURAL; INFILTRATION; INTRACAUDAL; PERINEURAL at 13:23

## 2025-05-15 ASSESSMENT — PAIN SCALES - GENERAL
PAINLEVEL_OUTOF10: MODERATE PAIN (6)
PAINLEVEL_OUTOF10: SEVERE PAIN (8)

## 2025-05-22 ENCOUNTER — RADIOLOGY INJECTION OFFICE VISIT (OUTPATIENT)
Dept: PHYSICAL MEDICINE AND REHAB | Facility: CLINIC | Age: 75
End: 2025-05-22
Attending: PHYSICAL MEDICINE & REHABILITATION
Payer: COMMERCIAL

## 2025-05-22 VITALS
SYSTOLIC BLOOD PRESSURE: 130 MMHG | DIASTOLIC BLOOD PRESSURE: 86 MMHG | HEART RATE: 77 BPM | TEMPERATURE: 97.3 F | OXYGEN SATURATION: 94 %

## 2025-05-22 DIAGNOSIS — M17.0 PRIMARY OSTEOARTHRITIS OF BOTH KNEES: ICD-10-CM

## 2025-05-22 RX ORDER — LIDOCAINE HYDROCHLORIDE 10 MG/ML
INJECTION, SOLUTION EPIDURAL; INFILTRATION; INTRACAUDAL; PERINEURAL
Status: COMPLETED | OUTPATIENT
Start: 2025-05-22 | End: 2025-05-22

## 2025-05-22 RX ADMIN — LIDOCAINE HYDROCHLORIDE 3 ML: 10 INJECTION, SOLUTION EPIDURAL; INFILTRATION; INTRACAUDAL; PERINEURAL at 13:59

## 2025-05-22 ASSESSMENT — PAIN SCALES - GENERAL: PAINLEVEL_OUTOF10: MODERATE PAIN (5)

## 2025-05-22 NOTE — PATIENT INSTRUCTIONS
DISCHARGE INSTRUCTIONS    During office hours (8:00 a.m.- 4:00 p.m.) questions or concerns may be answered  by calling [facility] Navigation Nurses at  [phone].  Messages received after hours will be returned the following business day.      In the case of an emergency, please dial 911 or seek assistance at the nearest Emergency Room/Urgent Care facility.     All Patients:    You may experience an increase in your symptoms for the first 2 days (It may take 2-3 weeks for the medication to have maximum effect).    You may use ice on the injection site, as frequently as 20 minutes each hour if needed.    You may take your pain medicine.    You may continue taking your regular medication after your injection.    You may shower. No swimming, tub bath or hot tub for 48 hours.  You may remove your bandaid/bandage as soon as you are home.    You may resume light activities, as tolerated.    Resume your usual diet as tolerated.      POSSIBLE EUFLEXXA SIDE EFFECTS    -If you experience these symptoms, it should only last for a short period    Swelling at injection site  Swelling of the knee joint              Bruising under the skin     Decreased appetite  Headache  Itching  Nausea  Diarrhea  Irritation to the stomach or intestines         POSSIBLE PROCEDURE SIDE EFFECTS  -Call the Spine Center if you are concerned  Increased Pain           Increased numbness/tingling      Nausea/Vomiting          Bruising/bleeding at site      Redness or swelling                                              Difficulty walking      Weakness           Fever greater than 100.5

## 2025-06-24 NOTE — PROGRESS NOTES
Assessment/Plan:      Holly was seen today for back pain.    Diagnoses and all orders for this visit:    Lumbar spine pain    Lumbar radicular pain  -     Pain Interlamin Epdrl Strd Inj Lumbar Sacral; Future    Foraminal stenosis of lumbar region  -     Pain Interlamin Epdrl Strd Inj Lumbar Sacral; Future    Lumbar facet arthropathy    Primary osteoarthritis of both knees  -     Cancel: XR Knee Bilateral 3 Views; Future  -     XR Knee Bilateral 3 Views; Future    Pain in both knees, unspecified chronicity  -     Cancel: XR Knee Bilateral 3 Views; Future  -     XR Knee Bilateral 3 Views; Future         Assessment: Pleasant 75 year old female with past medical history significant for macular degeneration with:     1.  Improvement of chronic lumbar spine pain at the lumbosacral junction and sacroiliac joint.  Consistent with facet arthropathy versus proximal radicular pain and potentially SI pain likely setting of mild scoliosis and thoracic kyphosis and significant lumbar degenerative disc disease.  She appears to have a functional sagittal plane imbalance with plain films revealing 3 cm positive sagittal plane balance.  Did not tolerate trial of duloxetine.  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  The degenerative changes and moderate to severe facet arthropathy at L4-5 and moderate at L5-S1 with moderate to severe foraminal stenosis at those levels.  Has degenerative changes of the SI joints as well.  Has had SI joint injections along with OMT prior.  Some increased pain with recent physical therapy home exercises.  Significant relief, over 50% improvement following L5-S1 interlaminar epidural steroid injection.  Had relief for over 4 months pain slowly worsened and now significant again.        2.  Improvement of left greater than right gluteal pain.  Most consistent with proximal radicular pain given the foraminal stenosis  in the lumbar spine and CT scan moderate to severe at L4-5 and L5-S1 however could be related to the left hip as well as she is decreased range of motion.  Resolution after lumbar epidural steroid injection.  Continues to have had improvement.        3.  Bilateral, right greater than left  knee pain consistent with osteoarthritis.  Very mild osteoarthritis on plain films from 2019.  Has responded well to Euflexxa in the past.  Has between 50 and 75% improvement with  Euflexxa injections for 3 to 4 months and then slow worsening.  Had been able to stop using a walker or cane with ambulation.  Improved balance with physical therapy.  Only up to 20% improvement following Euflexxa injection.        4.  Diffuse whole body pain consistent with myofascial pain likely superimposed polyarticular  osteoarthritis.            Discussion:    1.  I discussed the diagnosis and treatment options.  We discussed options of further injections lumbar spine further imaging of the lumbar spine or knees.    2.  Recommend L5-S1 interlaminar epidural steroid injection.  Had very good relief over 4 to 5 months of the low back pain following a lumbar epidural and complete resolution of gluteal pain but the back pain is worsening now.    3.  Recommend bilateral knee plain films to evaluate for progress osteoarthritis of the knees.    4.  We discussed options of orthopedic referral for the knees versus Colief RF but will start with plain films.    5.  Follow-up at earliest convenience for injection and I would like to see her back in 1 month for management of her complex musculoskeletal case of the knees and lumbar spine.  Can consider further imaging such as MRI of the back if no improvement with epidural.    It was our pleasure caring for your patient today, if there any questions or concerns please do not hesitate to contact us.    The longitudinal plan of care for the diagnosis(es)/condition(s) as documented were addressed during this  visit. Due to the added complexity in care, I will continue to support Holly in the subsequent management and with ongoing continuity of care.      Subjective:   Patient ID: Holly Wood is a 75 year old female.    History of Present Illness: Patient presents for follow-up of bilateral knee pain after Euflexxa injections also worsening low back pain.  Bilateral knees are mildly improved after Euflexxa up to 20% but still in fairly significant issue right greater than left worse with bending standing or walking.  Most significant pain over the anterior right knee.    Also having worsening low back pain fairly significant lumbosacral junction into the sacrum.  Previously having gluteal pain as well had marked improvement from lumbar epidural steroid injection at least over 50% and the gluteal pain has resolved with epidural last November.  She has had at least 4 months of dramatic relief and then pain worsening again worse with standing walking lifting better with medications sitting.  Pain is 8/10 at worst 4/10 today 3/10 at best.  No radiation distally into the legs pain down into the sacrum.  Taking gabapentin Celebrex and aspirin.      Imaging: CT lumbar spine reviewed showing severe disc LS L3-4 L4-5 L5-S1.  Mild stenosis central L5-S1 severe right moderate left.  Moderate facet arthropathy L4-5 with moderate severe foraminal stenosis.  Moderate facet arthropathy L5-S1.  Moderate spinal stenosis L3-4 with moderate facet arthropathy and mild to moderate foraminal stenosis.    Review of Systems: Complains of weakness in the back.  No paresthesias bowel or bladder incontinence swallowing issues fevers unintentional weight loss.           Current Outpatient Medications   Medication Sig Dispense Refill    celecoxib (CELEBREX) 100 MG capsule 1 capsule with food Orally Once a day for 90 days      cholecalciferol, vitamin D3, (VITAMIN D3 ORAL) [CHOLECALCIFEROL, VITAMIN D3, (VITAMIN D3 ORAL)] Take by mouth.       famotidine (PEPCID) 40 MG tablet [FAMOTIDINE (PEPCID) 40 MG TABLET] TAKE 1 TABLET BY MOUTH EVERYDAY AT BEDTIME      gabapentin (NEURONTIN) 300 MG capsule TAKE 1 CAPSULE BY MOUTH AT BEDTIME 270 capsule 1    losartan (COZAAR) 25 MG tablet Take 1 tablet by mouth daily      PREVIDENT 5000 DRY MOUTH 1.1 % Gel dental gel [PREVIDENT 5000 DRY MOUTH 1.1 % GEL DENTAL GEL] USE IN PLACE OF REGULAR TOOTHPASTE. BRUSH NORMALLY,THEN SWISH FOAM FOR 30 SECONDS.  6    VIT C/E/ZN/COPPR/LUTEIN/ZEAXAN (PRESERVISION AREDS 2 ORAL) [VIT C/E/ZN/COPPR/LUTEIN/ZEAXAN (PRESERVISION AREDS 2 ORAL)] Take by mouth.       No current facility-administered medications for this visit.       Past Medical History:   Diagnosis Date    Breast cyst     GERD (gastroesophageal reflux disease)     Insomnia        The following portions of the patient's history were reviewed and updated as appropriate: allergies, current medications, past family history, past medical history, past social history, past surgical history and problem list.           Objective:   Physical Exam:    BP (!) 175/81   Pulse 86   There is no height or weight on file to calculate BMI.      General: Alert and oriented with normal affect. Attention, knowledge, memory, and language are intact. No acute distress.   Eyes: Sclerae are clear.  Respirations: Unlabored. CV: No lower extremity edema.  Skin: No rashes seen.    Gait:  Nonantalgic, sagittal plane imbalance.  Reduced range of motion right knee flexion and extension with significant tenderness to palpation.  No tenderness to palpation left knee of significance.  Sensation is intact to light touch throughout the  lower extremities.       Manual muscle testing reveals:  Right /Left out of 5     5/5 hip flexors  5/5 knee flexors  5/5 knee extensors  5/5 ankle plantar flexors  5/5 ankle dorsiflexors  5/5   ankle evertors

## 2025-06-25 ENCOUNTER — OFFICE VISIT (OUTPATIENT)
Dept: PHYSICAL MEDICINE AND REHAB | Facility: CLINIC | Age: 75
End: 2025-06-25
Payer: COMMERCIAL

## 2025-06-25 VITALS — DIASTOLIC BLOOD PRESSURE: 81 MMHG | HEART RATE: 86 BPM | SYSTOLIC BLOOD PRESSURE: 175 MMHG

## 2025-06-25 DIAGNOSIS — M48.061 FORAMINAL STENOSIS OF LUMBAR REGION: ICD-10-CM

## 2025-06-25 DIAGNOSIS — M47.816 LUMBAR FACET ARTHROPATHY: ICD-10-CM

## 2025-06-25 DIAGNOSIS — M25.562 PAIN IN BOTH KNEES, UNSPECIFIED CHRONICITY: ICD-10-CM

## 2025-06-25 DIAGNOSIS — M25.561 PAIN IN BOTH KNEES, UNSPECIFIED CHRONICITY: ICD-10-CM

## 2025-06-25 DIAGNOSIS — M17.0 PRIMARY OSTEOARTHRITIS OF BOTH KNEES: ICD-10-CM

## 2025-06-25 DIAGNOSIS — M54.50 LUMBAR SPINE PAIN: Primary | ICD-10-CM

## 2025-06-25 DIAGNOSIS — M54.16 LUMBAR RADICULAR PAIN: ICD-10-CM

## 2025-06-25 PROCEDURE — G2211 COMPLEX E/M VISIT ADD ON: HCPCS | Performed by: PHYSICAL MEDICINE & REHABILITATION

## 2025-06-25 PROCEDURE — 3077F SYST BP >= 140 MM HG: CPT | Performed by: PHYSICAL MEDICINE & REHABILITATION

## 2025-06-25 PROCEDURE — 1125F AMNT PAIN NOTED PAIN PRSNT: CPT | Performed by: PHYSICAL MEDICINE & REHABILITATION

## 2025-06-25 PROCEDURE — 99214 OFFICE O/P EST MOD 30 MIN: CPT | Performed by: PHYSICAL MEDICINE & REHABILITATION

## 2025-06-25 PROCEDURE — 3079F DIAST BP 80-89 MM HG: CPT | Performed by: PHYSICAL MEDICINE & REHABILITATION

## 2025-06-25 ASSESSMENT — PAIN SCALES - GENERAL: PAINLEVEL_OUTOF10: MODERATE PAIN (4)

## 2025-06-25 NOTE — LETTER
6/25/2025      [facility]olly Wood  [address] [facility] [address]      Dear Colleague,    Thank you for referring your patient, Holly Wood, to the [facility]. Please see a copy of my visit note below.    Assessment/Plan:      Holly was seen today for back pain.    Diagnoses and all orders for this visit:    Lumbar spine pain    Lumbar radicular pain  -     Pain Interlamin Epdrl Strd Inj Lumbar Sacral; Future    Foraminal stenosis of lumbar region  -     Pain Interlamin Epdrl Strd Inj Lumbar Sacral; Future    Lumbar facet arthropathy    Primary osteoarthritis of both knees  -     Cancel: XR Knee Bilateral 3 Views; Future  -     XR Knee Bilateral 3 Views; Future    Pain in both knees, unspecified chronicity  -     Cancel: XR Knee Bilateral 3 Views; Future  -     XR Knee Bilateral 3 Views; Future         Assessment: Pleasant 75 year old female with past medical history significant for macular degeneration with:     1.  Improvement of chronic lumbar spine pain at the lumbosacral junction and sacroiliac joint.  Consistent with facet arthropathy versus proximal radicular pain and potentially SI pain likely setting of mild scoliosis and thoracic kyphosis and significant lumbar degenerative disc disease.  She appears to have a functional sagittal plane imbalance with plain films revealing 3 cm positive sagittal plane balance.  Did not tolerate trial of duloxetine.  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  The degenerative changes and moderate to severe facet arthropathy at L4-5 and moderate at L5-S1 with moderate to severe foraminal stenosis at those levels.  Has degenerative changes of the SI joints as well.  Has had SI joint injections along with OMT prior.  Some increased pain with recent physical therapy home exercises.  Significant relief, over 50% improvement following L5-S1  interlaminar epidural steroid injection.  Had relief for over 4 months pain slowly worsened and now significant again.        2.  Improvement of left greater than right gluteal pain.  Most consistent with proximal radicular pain given the foraminal stenosis in the lumbar spine and CT scan moderate to severe at L4-5 and L5-S1 however could be related to the left hip as well as she is decreased range of motion.  Resolution after lumbar epidural steroid injection.  Continues to have had improvement.        3.  Bilateral, right greater than left  knee pain consistent with osteoarthritis.  Very mild osteoarthritis on plain films from 2019.  Has responded well to Euflexxa in the past.  Has between 50 and 75% improvement with  Euflexxa injections for 3 to 4 months and then slow worsening.  Had been able to stop using a walker or cane with ambulation.  Improved balance with physical therapy.  Only up to 20% improvement following Euflexxa injection.        4.  Diffuse whole body pain consistent with myofascial pain likely superimposed polyarticular  osteoarthritis.            Discussion:    1.  I discussed the diagnosis and treatment options.  We discussed options of further injections lumbar spine further imaging of the lumbar spine or knees.    2.  Recommend L5-S1 interlaminar epidural steroid injection.  Had very good relief over 4 to 5 months of the low back pain following a lumbar epidural and complete resolution of gluteal pain but the back pain is worsening now.    3.  Recommend bilateral knee plain films to evaluate for progress osteoarthritis of the knees.    4.  We discussed options of orthopedic referral for the knees versus Colief RF but will start with plain films.    5.  Follow-up at earliest convenience for injection and I would like to see her back in 1 month for management of her complex musculoskeletal case of the knees and lumbar spine.  Can consider further imaging such as MRI of the back if no  improvement with epidural.    It was our pleasure caring for your patient today, if there any questions or concerns please do not hesitate to contact us.    The longitudinal plan of care for the diagnosis(es)/condition(s) as documented were addressed during this visit. Due to the added complexity in care, I will continue to support Holly in the subsequent management and with ongoing continuity of care.      Subjective:   Patient ID: Holly Wood is a 75 year old female.    History of Present Illness: Patient presents for follow-up of bilateral knee pain after Euflexxa injections also worsening low back pain.  Bilateral knees are mildly improved after Euflexxa up to 20% but still in fairly significant issue right greater than left worse with bending standing or walking.  Most significant pain over the anterior right knee.    Also having worsening low back pain fairly significant lumbosacral junction into the sacrum.  Previously having gluteal pain as well had marked improvement from lumbar epidural steroid injection at least over 50% and the gluteal pain has resolved with epidural last November.  She has had at least 4 months of dramatic relief and then pain worsening again worse with standing walking lifting better with medications sitting.  Pain is 8/10 at worst 4/10 today 3/10 at best.  No radiation distally into the legs pain down into the sacrum.  Taking gabapentin Celebrex and aspirin.      Imaging: CT lumbar spine reviewed showing severe disc LS L3-4 L4-5 L5-S1.  Mild stenosis central L5-S1 severe right moderate left.  Moderate facet arthropathy L4-5 with moderate severe foraminal stenosis.  Moderate facet arthropathy L5-S1.  Moderate spinal stenosis L3-4 with moderate facet arthropathy and mild to moderate foraminal stenosis.    Review of Systems: Complains of weakness in the back.  No paresthesias bowel or bladder incontinence swallowing issues fevers unintentional weight loss.           Current  Outpatient Medications   Medication Sig Dispense Refill     celecoxib (CELEBREX) 100 MG capsule 1 capsule with food Orally Once a day for 90 days       cholecalciferol, vitamin D3, (VITAMIN D3 ORAL) [CHOLECALCIFEROL, VITAMIN D3, (VITAMIN D3 ORAL)] Take by mouth.       famotidine (PEPCID) 40 MG tablet [FAMOTIDINE (PEPCID) 40 MG TABLET] TAKE 1 TABLET BY MOUTH EVERYDAY AT BEDTIME       gabapentin (NEURONTIN) 300 MG capsule TAKE 1 CAPSULE BY MOUTH AT BEDTIME 270 capsule 1     losartan (COZAAR) 25 MG tablet Take 1 tablet by mouth daily       PREVIDENT 5000 DRY MOUTH 1.1 % Gel dental gel [PREVIDENT 5000 DRY MOUTH 1.1 % GEL DENTAL GEL] USE IN PLACE OF REGULAR TOOTHPASTE. BRUSH NORMALLY,THEN SWISH FOAM FOR 30 SECONDS.  6     VIT C/E/ZN/COPPR/LUTEIN/ZEAXAN (PRESERVISION AREDS 2 ORAL) [VIT C/E/ZN/COPPR/LUTEIN/ZEAXAN (PRESERVISION AREDS 2 ORAL)] Take by mouth.       No current facility-administered medications for this visit.       Past Medical History:   Diagnosis Date     Breast cyst      GERD (gastroesophageal reflux disease)      Insomnia        The following portions of the patient's history were reviewed and updated as appropriate: allergies, current medications, past family history, past medical history, past social history, past surgical history and problem list.           Objective:   Physical Exam:    BP (!) 175/81   Pulse 86   There is no height or weight on file to calculate BMI.      General: Alert and oriented with normal affect. Attention, knowledge, memory, and language are intact. No acute distress.   Eyes: Sclerae are clear.  Respirations: Unlabored. CV: No lower extremity edema.  Skin: No rashes seen.    Gait:  Nonantalgic, sagittal plane imbalance.  Reduced range of motion right knee flexion and extension with significant tenderness to palpation.  No tenderness to palpation left knee of significance.  Sensation is intact to light touch throughout the  lower extremities.       Manual muscle testing  reveals:  Right /Left out of 5     5/5 hip flexors  5/5 knee flexors  5/5 knee extensors  5/5 ankle plantar flexors  5/5 ankle dorsiflexors  5/5   ankle evertors    Again, thank you for allowing me to participate in the care of your patient.        Sincerely,        Wesley Warren, DO    Electronically signed

## 2025-06-25 NOTE — PATIENT INSTRUCTIONS
A lumbar epidural steroid injection has been ordered today. Please schedule this injection at least 2 weeks from now to allow time for insurance prior authorization. On the day of your injection, you cannot be sick or taking antibiotics. If you become sick and are prescribed, please call the clinic so your injection can be rescheduled for once you have completed your antibiotics.  It is recommended that you do not have a vaccination within 2 weeks of your procedure if it will contain steroids, as this may make the vaccination less effective.  You will need to bring a  with you for your injection. If you have any questions or concerns prior to your injection, please do not hesitate to call the nurse navigation line at [phone].   An xray was ordered for you today.  Please call Radiology at [phone].

## 2025-07-09 ENCOUNTER — HOSPITAL ENCOUNTER (OUTPATIENT)
Dept: GENERAL RADIOLOGY | Facility: HOSPITAL | Age: 75
Discharge: HOME | End: 2025-07-09
Attending: PHYSICAL MEDICINE & REHABILITATION
Payer: COMMERCIAL

## 2025-07-09 DIAGNOSIS — M17.0 PRIMARY OSTEOARTHRITIS OF BOTH KNEES: ICD-10-CM

## 2025-07-09 DIAGNOSIS — M25.561 PAIN IN BOTH KNEES, UNSPECIFIED CHRONICITY: ICD-10-CM

## 2025-07-09 DIAGNOSIS — M25.562 PAIN IN BOTH KNEES, UNSPECIFIED CHRONICITY: ICD-10-CM

## 2025-07-09 PROCEDURE — 73562 X-RAY EXAM OF KNEE 3: CPT | Mod: 50
